# Patient Record
Sex: FEMALE | Race: WHITE | Employment: OTHER | URBAN - METROPOLITAN AREA
[De-identification: names, ages, dates, MRNs, and addresses within clinical notes are randomized per-mention and may not be internally consistent; named-entity substitution may affect disease eponyms.]

---

## 2017-01-13 DIAGNOSIS — I10 ESSENTIAL HYPERTENSION: ICD-10-CM

## 2017-01-17 RX ORDER — INSULIN ASPART 100 [IU]/ML
INJECTION, SOLUTION INTRAVENOUS; SUBCUTANEOUS
Qty: 5 PEN | Refills: 2 | Status: SHIPPED | OUTPATIENT
Start: 2017-01-17 | End: 2017-03-31 | Stop reason: SDUPTHER

## 2017-02-07 ENCOUNTER — OFFICE VISIT (OUTPATIENT)
Dept: FAMILY MEDICINE CLINIC | Age: 72
End: 2017-02-07

## 2017-02-07 VITALS
TEMPERATURE: 97 F | SYSTOLIC BLOOD PRESSURE: 140 MMHG | WEIGHT: 150 LBS | BODY MASS INDEX: 27.6 KG/M2 | OXYGEN SATURATION: 98 % | HEART RATE: 78 BPM | HEIGHT: 62 IN | DIASTOLIC BLOOD PRESSURE: 82 MMHG

## 2017-02-07 DIAGNOSIS — I10 ESSENTIAL HYPERTENSION: ICD-10-CM

## 2017-02-07 DIAGNOSIS — K21.9 GASTROESOPHAGEAL REFLUX DISEASE WITHOUT ESOPHAGITIS: ICD-10-CM

## 2017-02-07 DIAGNOSIS — C85.90 LYMPHOMA, UNSPECIFIED BODY REGION, UNSPECIFIED LYMPHOMA TYPE (HCC): ICD-10-CM

## 2017-02-07 DIAGNOSIS — R05.9 COUGH: ICD-10-CM

## 2017-02-07 DIAGNOSIS — J31.0 RHINITIS, UNSPECIFIED TYPE: Primary | ICD-10-CM

## 2017-02-07 DIAGNOSIS — J34.9 SINUS DISORDER: ICD-10-CM

## 2017-02-07 DIAGNOSIS — Z79.4 TYPE 2 DIABETES MELLITUS WITH DIABETIC NEUROPATHY, WITH LONG-TERM CURRENT USE OF INSULIN (HCC): ICD-10-CM

## 2017-02-07 DIAGNOSIS — G62.9 NEUROPATHY: ICD-10-CM

## 2017-02-07 DIAGNOSIS — E11.40 TYPE 2 DIABETES MELLITUS WITH DIABETIC NEUROPATHY, WITH LONG-TERM CURRENT USE OF INSULIN (HCC): ICD-10-CM

## 2017-02-07 DIAGNOSIS — G25.81 RESTLESS LEG SYNDROME: ICD-10-CM

## 2017-02-07 DIAGNOSIS — Z13.9 SCREENING: ICD-10-CM

## 2017-02-07 PROCEDURE — 99214 OFFICE O/P EST MOD 30 MIN: CPT | Performed by: FAMILY MEDICINE

## 2017-02-07 RX ORDER — PANTOPRAZOLE SODIUM 40 MG/1
40 TABLET, DELAYED RELEASE ORAL DAILY
Qty: 90 TABLET | Refills: 1 | Status: SHIPPED | OUTPATIENT
Start: 2017-02-07 | End: 2017-08-29 | Stop reason: SDUPTHER

## 2017-02-07 RX ORDER — PEN NEEDLE, DIABETIC 30 GX3/16"
1 NEEDLE, DISPOSABLE MISCELLANEOUS 2 TIMES DAILY
Qty: 180 EACH | Refills: 3 | Status: SHIPPED | OUTPATIENT
Start: 2017-02-07 | End: 2018-05-29 | Stop reason: SDUPTHER

## 2017-02-07 RX ORDER — GABAPENTIN 300 MG/1
300 CAPSULE ORAL 3 TIMES DAILY
Qty: 270 CAPSULE | Refills: 1 | Status: SHIPPED | OUTPATIENT
Start: 2017-02-07 | End: 2017-08-29 | Stop reason: SDUPTHER

## 2017-02-07 RX ORDER — BROMPHENIRAMINE MALEATE, PSEUDOEPHEDRINE HYDROCHLORIDE, AND DEXTROMETHORPHAN HYDROBROMIDE 2; 30; 10 MG/5ML; MG/5ML; MG/5ML
5 SYRUP ORAL 4 TIMES DAILY PRN
Qty: 240 ML | Refills: 1 | Status: SHIPPED | OUTPATIENT
Start: 2017-02-07 | End: 2017-05-25

## 2017-02-07 RX ORDER — ROPINIROLE 2 MG/1
2 TABLET, FILM COATED ORAL 2 TIMES DAILY
Qty: 180 TABLET | Refills: 1 | Status: SHIPPED | OUTPATIENT
Start: 2017-02-07 | End: 2017-08-29 | Stop reason: SDUPTHER

## 2017-02-07 RX ORDER — PEN NEEDLE, DIABETIC 30 GX3/16"
1 NEEDLE, DISPOSABLE MISCELLANEOUS 2 TIMES DAILY
COMMUNITY
End: 2017-02-07 | Stop reason: SDUPTHER

## 2017-02-07 ASSESSMENT — ENCOUNTER SYMPTOMS
RESPIRATORY NEGATIVE: 1
RHINORRHEA: 1
GASTROINTESTINAL NEGATIVE: 1

## 2017-02-10 ENCOUNTER — HOSPITAL ENCOUNTER (OUTPATIENT)
Dept: OTHER | Age: 72
Discharge: HOME | End: 2017-02-10
Attending: INTERNAL MEDICINE | Admitting: INTERNAL MEDICINE

## 2017-02-10 LAB
ALBUMIN SERPL-MCNC: 4.2 GM/DL (ref 3.4–5)
ALBUMIN SERPL-MCNC: 4.4 G/DL
ALP BLD-CCNC: 67 IU/L (ref 40–128)
ALP BLD-CCNC: 69 U/L
ALT SERPL-CCNC: 15 U/L (ref 10–40)
ALT SERPL-CCNC: 16 U/L
ANION GAP SERPL CALCULATED.3IONS-SCNC: 12 MMOL/L (ref 4–16)
AST SERPL-CCNC: 17 IU/L (ref 15–37)
AST SERPL-CCNC: 23 U/L
BASOPHILS ABSOLUTE: 0 /ΜL
BASOPHILS RELATIVE PERCENT: 1.1 %
BILIRUB SERPL-MCNC: 0.6 MG/DL (ref 0–1)
BILIRUB SERPL-MCNC: 0.7 MG/DL (ref 0.1–1.4)
BUN BLDV-MCNC: 15 MG/DL
BUN BLDV-MCNC: 15 MG/DL (ref 6–23)
CALCIUM SERPL-MCNC: 9 MG/DL (ref 8.3–10.6)
CALCIUM SERPL-MCNC: 9.6 MG/DL
CHLORIDE BLD-SCNC: 89 MMOL/L (ref 99–110)
CHLORIDE BLD-SCNC: 94 MMOL/L
CHOLESTEROL, TOTAL: 150 MG/DL
CHOLESTEROL/HDL RATIO: NORMAL
CO2: 26 MMOL/L (ref 21–32)
CO2: 28 MMOL/L
CREAT SERPL-MCNC: 0.8 MG/DL
CREAT SERPL-MCNC: 0.8 MG/DL (ref 0.6–1.1)
EOSINOPHILS ABSOLUTE: 0.1 /ΜL
EOSINOPHILS RELATIVE PERCENT: 1.7 %
GFR AFRICAN AMERICAN: >60 ML/MIN/1.73M2
GFR CALCULATED: 75
GFR NON-AFRICAN AMERICAN: >60 ML/MIN/1.73M2
GLUCOSE BLD-MCNC: 91 MG/DL
GLUCOSE FASTING: 227 MG/DL (ref 70–99)
HBA1C MFR BLD: 6.2 %
HCT VFR BLD CALC: 37 % (ref 36–46)
HDLC SERPL-MCNC: 43 MG/DL (ref 35–70)
HEMOGLOBIN: 13.1 G/DL (ref 12–16)
IGA: 21 MG/DL (ref 70–400)
IGA: ABNORMAL
IGG,SERUM: 258 MG/DL (ref 700–1600)
IGG,SERUM: ABNORMAL
IGM,SERUM: 641 MG/DL (ref 62–277)
LDL CHOLESTEROL CALCULATED: 76 MG/DL (ref 0–160)
LYMPHOCYTES ABSOLUTE: 0.4 /ΜL
LYMPHOCYTES RELATIVE PERCENT: 12.7 %
MCH RBC QN AUTO: 34 PG
MCHC RBC AUTO-ENTMCNC: 35.5 G/DL
MCV RBC AUTO: 95.7 FL
MONOCYTES ABSOLUTE: 0.3 /ΜL
MONOCYTES RELATIVE PERCENT: 9 %
NEUTROPHILS ABSOLUTE: 2.4 /ΜL
NEUTROPHILS RELATIVE PERCENT: 75.5 %
PDW BLD-RTO: 14 %
PLATELET # BLD: 165 K/ΜL
PMV BLD AUTO: NORMAL FL
POTASSIUM SERPL-SCNC: 3.7 MMOL/L (ref 3.5–5.1)
POTASSIUM SERPL-SCNC: 4.1 MMOL/L
RBC # BLD: 3.87 10^6/ΜL
SODIUM BLD-SCNC: 127 MMOL/L (ref 135–145)
SODIUM BLD-SCNC: 134 MMOL/L
TOTAL PROTEIN: 6.2 GM/DL (ref 6.4–8.2)
TOTAL PROTEIN: 6.6
TRIGL SERPL-MCNC: 155 MG/DL
VLDLC SERPL CALC-MCNC: 31 MG/DL
WBC # BLD: 3.2 10^3/ML

## 2017-02-13 LAB
ALBUMIN ELP: 3.8 GM/DL (ref 3.2–5.6)
ALPHA-1-GLOBULIN: 0.2 GM/DL (ref 0.1–0.4)
ALPHA-2-GLOBULIN: 0.6 GM/DL (ref 0.4–1.2)
BETA GLOBULIN: 0.8 GM/DL (ref 0.5–1.3)
GAMMA GLOBULIN: 0.9 GM/DL (ref 0.5–1.6)
IMMUNOFIXATION ELECTROPHORESIS 1: NORMAL
TOTAL PROTEIN: 6.2 GM/DL (ref 6.4–8.2)

## 2017-02-20 DIAGNOSIS — E11.40 TYPE 2 DIABETES MELLITUS WITH DIABETIC NEUROPATHY, WITH LONG-TERM CURRENT USE OF INSULIN (HCC): ICD-10-CM

## 2017-02-20 DIAGNOSIS — I10 ESSENTIAL HYPERTENSION: ICD-10-CM

## 2017-02-20 DIAGNOSIS — Z13.9 SCREENING: ICD-10-CM

## 2017-02-20 DIAGNOSIS — Z79.4 TYPE 2 DIABETES MELLITUS WITH DIABETIC NEUROPATHY, WITH LONG-TERM CURRENT USE OF INSULIN (HCC): ICD-10-CM

## 2017-05-25 ENCOUNTER — OFFICE VISIT (OUTPATIENT)
Dept: FAMILY MEDICINE CLINIC | Age: 72
End: 2017-05-25

## 2017-05-25 VITALS
DIASTOLIC BLOOD PRESSURE: 70 MMHG | OXYGEN SATURATION: 98 % | BODY MASS INDEX: 27.82 KG/M2 | HEIGHT: 62 IN | HEART RATE: 79 BPM | SYSTOLIC BLOOD PRESSURE: 118 MMHG | WEIGHT: 151.2 LBS

## 2017-05-25 DIAGNOSIS — E11.40 TYPE 2 DIABETES MELLITUS WITH DIABETIC NEUROPATHY, WITH LONG-TERM CURRENT USE OF INSULIN (HCC): Primary | ICD-10-CM

## 2017-05-25 DIAGNOSIS — I10 ESSENTIAL HYPERTENSION: ICD-10-CM

## 2017-05-25 DIAGNOSIS — G25.81 RESTLESS LEG SYNDROME: ICD-10-CM

## 2017-05-25 DIAGNOSIS — C83.00 MALIGNANT LYMPHOPLASMACYTIC LYMPHOMA (HCC): ICD-10-CM

## 2017-05-25 DIAGNOSIS — Z79.4 TYPE 2 DIABETES MELLITUS WITH DIABETIC NEUROPATHY, WITH LONG-TERM CURRENT USE OF INSULIN (HCC): Primary | ICD-10-CM

## 2017-05-25 LAB — HBA1C MFR BLD: 6.1 %

## 2017-05-25 PROCEDURE — 83036 HEMOGLOBIN GLYCOSYLATED A1C: CPT | Performed by: FAMILY MEDICINE

## 2017-05-25 PROCEDURE — 99214 OFFICE O/P EST MOD 30 MIN: CPT | Performed by: FAMILY MEDICINE

## 2017-05-25 RX ORDER — LOSARTAN POTASSIUM AND HYDROCHLOROTHIAZIDE 12.5; 1 MG/1; MG/1
1 TABLET ORAL DAILY
Qty: 90 TABLET | Refills: 3 | Status: SHIPPED | OUTPATIENT
Start: 2017-05-25 | End: 2018-05-29 | Stop reason: SDUPTHER

## 2017-05-25 RX ORDER — CYCLOSPORINE 0.5 MG/ML
1 EMULSION OPHTHALMIC 2 TIMES DAILY
COMMUNITY

## 2017-05-25 RX ORDER — GLUCOSAMINE HCL/CHONDROITIN SU 500-400 MG
CAPSULE ORAL
Qty: 100 STRIP | Refills: 11 | Status: SHIPPED | OUTPATIENT
Start: 2017-05-25 | End: 2018-05-29 | Stop reason: SDUPTHER

## 2017-05-25 RX ORDER — PIMECROLIMUS 10 MG/G
CREAM TOPICAL 2 TIMES DAILY
COMMUNITY
End: 2017-11-30 | Stop reason: SDUPTHER

## 2017-05-26 DIAGNOSIS — Z79.4 TYPE 2 DIABETES MELLITUS WITH DIABETIC NEUROPATHY, WITH LONG-TERM CURRENT USE OF INSULIN (HCC): ICD-10-CM

## 2017-05-26 DIAGNOSIS — E11.40 TYPE 2 DIABETES MELLITUS WITH DIABETIC NEUROPATHY, WITH LONG-TERM CURRENT USE OF INSULIN (HCC): ICD-10-CM

## 2017-05-26 RX ORDER — GLUCOSAMINE HCL/CHONDROITIN SU 500-400 MG
CAPSULE ORAL
Qty: 100 STRIP | Refills: 5 | Status: SHIPPED | OUTPATIENT
Start: 2017-05-26 | End: 2018-06-04 | Stop reason: SDUPTHER

## 2017-05-26 RX ORDER — LANCETS 30 GAUGE
EACH MISCELLANEOUS
Qty: 100 EACH | Refills: 5 | Status: SHIPPED | OUTPATIENT
Start: 2017-05-26

## 2017-05-26 ASSESSMENT — ENCOUNTER SYMPTOMS
ABDOMINAL PAIN: 0
EYE ITCHING: 0
APNEA: 0
COUGH: 0
CONSTIPATION: 0
VOMITING: 0
STRIDOR: 0
TROUBLE SWALLOWING: 0
SORE THROAT: 0
EYE REDNESS: 0
WHEEZING: 0
DIARRHEA: 0
SHORTNESS OF BREATH: 0
NAUSEA: 0
SINUS PRESSURE: 0

## 2017-06-09 ENCOUNTER — HOSPITAL ENCOUNTER (OUTPATIENT)
Dept: OTHER | Age: 72
Discharge: HOME | End: 2017-06-09
Attending: INTERNAL MEDICINE | Admitting: INTERNAL MEDICINE

## 2017-06-09 LAB
ALBUMIN SERPL-MCNC: 3.9 GM/DL (ref 3.4–5)
ALP BLD-CCNC: 69 IU/L (ref 40–128)
ALT SERPL-CCNC: 30 U/L (ref 10–40)
ANION GAP SERPL CALCULATED.3IONS-SCNC: 13 MMOL/L (ref 4–16)
AST SERPL-CCNC: 21 IU/L (ref 15–37)
BILIRUB SERPL-MCNC: 0.5 MG/DL (ref 0–1)
BUN BLDV-MCNC: 17 MG/DL (ref 6–23)
CALCIUM SERPL-MCNC: 9 MG/DL (ref 8.3–10.6)
CHLORIDE BLD-SCNC: 93 MMOL/L (ref 99–110)
CO2: 26 MMOL/L (ref 21–32)
CREAT SERPL-MCNC: 0.9 MG/DL (ref 0.6–1.1)
GFR AFRICAN AMERICAN: >60 ML/MIN/1.73M2
GFR NON-AFRICAN AMERICAN: >60 ML/MIN/1.73M2
GLUCOSE BLD-MCNC: 342 MG/DL (ref 70–140)
IGA: <50 MG/DL (ref 69–382)
IGG,SERUM: <300 MG/DL (ref 723–1685)
IGM,SERUM: 627 MG/DL (ref 62–277)
POTASSIUM SERPL-SCNC: 4.4 MMOL/L (ref 3.5–5.1)
SODIUM BLD-SCNC: 132 MMOL/L (ref 135–145)
TOTAL PROTEIN: 5.8 GM/DL (ref 6.4–8.2)

## 2017-06-12 LAB
ALBUMIN ELP: 2.8 GM/DL (ref 3.2–5.6)
ALPHA-1-GLOBULIN: 0.3 GM/DL (ref 0.1–0.4)
ALPHA-2-GLOBULIN: 0.7 GM/DL (ref 0.4–1.2)
BETA GLOBULIN: 0.6 GM/DL (ref 0.5–1.3)
GAMMA GLOBULIN: 0.6 GM/DL (ref 0.5–1.6)
IMMUNOFIXATION ELECTROPHORESIS 1: NORMAL
TOTAL PROTEIN: 5.8 GM/DL (ref 6.4–8.2)

## 2017-08-29 DIAGNOSIS — G25.81 RESTLESS LEG SYNDROME: ICD-10-CM

## 2017-08-29 DIAGNOSIS — G62.9 NEUROPATHY: ICD-10-CM

## 2017-08-29 DIAGNOSIS — K21.9 GASTROESOPHAGEAL REFLUX DISEASE WITHOUT ESOPHAGITIS: ICD-10-CM

## 2017-08-29 RX ORDER — GABAPENTIN 300 MG/1
300 CAPSULE ORAL 3 TIMES DAILY
Qty: 270 CAPSULE | Refills: 1 | Status: SHIPPED | OUTPATIENT
Start: 2017-08-29 | End: 2018-02-08 | Stop reason: SDUPTHER

## 2017-08-29 RX ORDER — PANTOPRAZOLE SODIUM 40 MG/1
40 TABLET, DELAYED RELEASE ORAL DAILY
Qty: 90 TABLET | Refills: 1 | Status: SHIPPED | OUTPATIENT
Start: 2017-08-29 | End: 2018-02-08 | Stop reason: SDUPTHER

## 2017-08-29 RX ORDER — ROPINIROLE 2 MG/1
2 TABLET, FILM COATED ORAL 2 TIMES DAILY
Qty: 180 TABLET | Refills: 1 | Status: SHIPPED | OUTPATIENT
Start: 2017-08-29 | End: 2018-02-08 | Stop reason: SDUPTHER

## 2017-09-06 ENCOUNTER — TELEPHONE (OUTPATIENT)
Dept: FAMILY MEDICINE CLINIC | Age: 72
End: 2017-09-06

## 2017-09-06 DIAGNOSIS — Z12.31 ENCOUNTER FOR SCREENING MAMMOGRAM FOR BREAST CANCER: Primary | ICD-10-CM

## 2017-09-06 DIAGNOSIS — M85.80 OSTEOPENIA, UNSPECIFIED LOCATION: ICD-10-CM

## 2017-09-07 PROBLEM — M85.80 OSTEOPENIA: Status: ACTIVE | Noted: 2017-09-07

## 2017-09-21 ENCOUNTER — HOSPITAL ENCOUNTER (OUTPATIENT)
Dept: WOMENS IMAGING | Age: 72
Discharge: HOME | End: 2017-09-21
Attending: FAMILY MEDICINE | Admitting: FAMILY MEDICINE

## 2017-09-21 DIAGNOSIS — M85.80 OSTEOPENIA, UNSPECIFIED LOCATION: ICD-10-CM

## 2017-09-21 DIAGNOSIS — Z12.31 ENCOUNTER FOR SCREENING MAMMOGRAM FOR BREAST CANCER: ICD-10-CM

## 2017-09-25 ENCOUNTER — HOSPITAL ENCOUNTER (OUTPATIENT)
Dept: ULTRASOUND IMAGING | Age: 72
Discharge: HOME | End: 2017-09-25
Attending: FAMILY MEDICINE | Admitting: FAMILY MEDICINE

## 2017-09-25 DIAGNOSIS — N64.89 BREAST ASYMMETRY: ICD-10-CM

## 2017-09-26 ENCOUNTER — TELEPHONE (OUTPATIENT)
Dept: FAMILY MEDICINE CLINIC | Age: 72
End: 2017-09-26

## 2017-09-27 ENCOUNTER — TELEPHONE (OUTPATIENT)
Dept: FAMILY MEDICINE CLINIC | Age: 72
End: 2017-09-27

## 2017-10-09 ENCOUNTER — HOSPITAL ENCOUNTER (OUTPATIENT)
Dept: OTHER | Age: 72
Discharge: HOME | End: 2017-10-09
Attending: INTERNAL MEDICINE | Admitting: INTERNAL MEDICINE

## 2017-10-09 LAB
ALBUMIN SERPL-MCNC: 4.3 GM/DL (ref 3.4–5)
ALP BLD-CCNC: 70 IU/L (ref 40–128)
ALT SERPL-CCNC: 18 U/L (ref 10–40)
ANION GAP SERPL CALCULATED.3IONS-SCNC: 11 MMOL/L (ref 4–16)
AST SERPL-CCNC: 17 IU/L (ref 15–37)
BILIRUB SERPL-MCNC: 0.5 MG/DL (ref 0–1)
BUN BLDV-MCNC: 20 MG/DL (ref 6–23)
CALCIUM SERPL-MCNC: 9.6 MG/DL (ref 8.3–10.6)
CHLORIDE BLD-SCNC: 98 MMOL/L (ref 99–110)
CO2: 28 MMOL/L (ref 21–32)
CREAT SERPL-MCNC: 1 MG/DL (ref 0.6–1.1)
GFR AFRICAN AMERICAN: >60 ML/MIN/1.73M2
GFR NON-AFRICAN AMERICAN: 55 ML/MIN/1.73M2
GLUCOSE BLD-MCNC: 117 MG/DL (ref 70–140)
IGA: <50 MG/DL (ref 69–382)
IGG,SERUM: <300 MG/DL (ref 723–1685)
IGM,SERUM: 625 MG/DL (ref 62–277)
POTASSIUM SERPL-SCNC: 3.6 MMOL/L (ref 3.5–5.1)
SODIUM BLD-SCNC: 137 MMOL/L (ref 135–145)
TOTAL PROTEIN: 6.2 GM/DL (ref 6.4–8.2)

## 2017-10-12 LAB
ALBUMIN ELP: 3.9 GM/DL (ref 3.2–5.6)
ALPHA-1-GLOBULIN: 0.2 GM/DL (ref 0.1–0.4)
ALPHA-2-GLOBULIN: 0.6 GM/DL (ref 0.4–1.2)
BETA GLOBULIN: 0.8 GM/DL (ref 0.5–1.3)
GAMMA GLOBULIN: 0.8 GM/DL (ref 0.5–1.6)
IMMUNOFIXATION ELECTROPHORESIS 1: NORMAL
TOTAL PROTEIN: 6.2 GM/DL (ref 6.4–8.2)

## 2017-11-30 ENCOUNTER — OFFICE VISIT (OUTPATIENT)
Dept: FAMILY MEDICINE CLINIC | Age: 72
End: 2017-11-30

## 2017-11-30 VITALS
HEART RATE: 76 BPM | DIASTOLIC BLOOD PRESSURE: 70 MMHG | SYSTOLIC BLOOD PRESSURE: 120 MMHG | RESPIRATION RATE: 18 BRPM | BODY MASS INDEX: 27.07 KG/M2 | WEIGHT: 148 LBS

## 2017-11-30 DIAGNOSIS — Z79.4 TYPE 2 DIABETES MELLITUS WITH DIABETIC NEUROPATHY, WITH LONG-TERM CURRENT USE OF INSULIN (HCC): Primary | ICD-10-CM

## 2017-11-30 DIAGNOSIS — E11.40 TYPE 2 DIABETES MELLITUS WITH DIABETIC NEUROPATHY, WITH LONG-TERM CURRENT USE OF INSULIN (HCC): Primary | ICD-10-CM

## 2017-11-30 DIAGNOSIS — Z23 NEED FOR PNEUMOCOCCAL VACCINE: ICD-10-CM

## 2017-11-30 DIAGNOSIS — K21.9 GASTROESOPHAGEAL REFLUX DISEASE WITHOUT ESOPHAGITIS: ICD-10-CM

## 2017-11-30 DIAGNOSIS — I10 ESSENTIAL HYPERTENSION: ICD-10-CM

## 2017-11-30 DIAGNOSIS — G25.81 RESTLESS LEG SYNDROME: ICD-10-CM

## 2017-11-30 DIAGNOSIS — C83.00 MALIGNANT LYMPHOPLASMACYTIC LYMPHOMA (HCC): ICD-10-CM

## 2017-11-30 DIAGNOSIS — G47.00 INSOMNIA, UNSPECIFIED TYPE: ICD-10-CM

## 2017-11-30 DIAGNOSIS — M81.0 OSTEOPOROSIS, UNSPECIFIED OSTEOPOROSIS TYPE, UNSPECIFIED PATHOLOGICAL FRACTURE PRESENCE: ICD-10-CM

## 2017-11-30 LAB
CREATININE URINE POCT: NORMAL
HBA1C MFR BLD: 6.6 %
MICROALBUMIN/CREAT 24H UR: 30 MG/G{CREAT}
MICROALBUMIN/CREAT UR-RTO: 50

## 2017-11-30 PROCEDURE — 82044 UR ALBUMIN SEMIQUANTITATIVE: CPT | Performed by: FAMILY MEDICINE

## 2017-11-30 PROCEDURE — 99214 OFFICE O/P EST MOD 30 MIN: CPT | Performed by: FAMILY MEDICINE

## 2017-11-30 PROCEDURE — 83036 HEMOGLOBIN GLYCOSYLATED A1C: CPT | Performed by: FAMILY MEDICINE

## 2017-11-30 PROCEDURE — 90670 PCV13 VACCINE IM: CPT | Performed by: FAMILY MEDICINE

## 2017-11-30 PROCEDURE — G0009 ADMIN PNEUMOCOCCAL VACCINE: HCPCS | Performed by: FAMILY MEDICINE

## 2017-11-30 RX ORDER — TRAZODONE HYDROCHLORIDE 50 MG/1
50 TABLET ORAL NIGHTLY
Qty: 30 TABLET | Refills: 5 | Status: SHIPPED | OUTPATIENT
Start: 2017-11-30 | End: 2018-05-29

## 2017-11-30 RX ORDER — ALENDRONATE SODIUM 70 MG/1
70 TABLET ORAL
Qty: 12 TABLET | Refills: 3 | Status: SHIPPED | OUTPATIENT
Start: 2017-11-30 | End: 2018-05-29 | Stop reason: SDUPTHER

## 2017-11-30 RX ORDER — PIMECROLIMUS 10 MG/G
CREAM TOPICAL 2 TIMES DAILY
Qty: 1 TUBE | Refills: 3 | Status: SHIPPED | OUTPATIENT
Start: 2017-11-30

## 2017-11-30 ASSESSMENT — ENCOUNTER SYMPTOMS
COUGH: 0
SHORTNESS OF BREATH: 0
SINUS PAIN: 0
SINUS PRESSURE: 0

## 2017-11-30 NOTE — PROGRESS NOTES
deficit. She exhibits normal muscle tone. Coordination normal.   Skin: She is not diaphoretic. Psychiatric: She has a normal mood and affect. Her behavior is normal.       ASSESSMENT/ PLAN:    1. Type 2 diabetes mellitus with diabetic neuropathy, with long-term current use of insulin (HCC)  - insulin glargine (LANTUS SOLOSTAR) 100 UNIT/ML injection pen; Inject 65 Units into the skin nightly  Dispense: 10 Pen; Refill: 3  - insulin aspart (NOVOLOG FLEXPEN) 100 UNIT/ML injection pen; Inject 20 Units into the skin 3 times daily (before meals)  Dispense: 5 Pen; Refill: 5  - POCT glycosylated hemoglobin (Hb A1C)  - POCT microalbumin  - Under control  - Patient need diabetic shoes for her diabetics neurophathy      2. Essential hypertension  - stable    3. Malignant lymphoplasmacytic lymphoma (HCC)  - Stable, f/u with the oncology    4. Gastroesophageal reflux disease without esophagitis  - stable    5. Insomnia, unspecified type  - Start:  - traZODone (DESYREL) 50 MG tablet; Take 1 tablet by mouth nightly  Dispense: 30 tablet; Refill: 5    6. Osteoporosis, unspecified osteoporosis type, unspecified pathological fracture presence  - Start Fosamax      7. Restless leg syndrome  - stable    8. Need for pneumococcal vaccine  - Pneumococcal conjugate vaccine 13-valent IM (PREVNAR 13)  - Tolerate the shot            - Appropriate prescription are addressed. - After visit summery provided. - Questions answered and patient verbalizes understanding.  - Call for any problem, questions, or concerns.  - RTC if symptoms worse. Return in about 6 months (around 5/30/2018).

## 2018-02-08 DIAGNOSIS — K21.9 GASTROESOPHAGEAL REFLUX DISEASE WITHOUT ESOPHAGITIS: ICD-10-CM

## 2018-02-08 DIAGNOSIS — G25.81 RESTLESS LEG SYNDROME: ICD-10-CM

## 2018-02-08 DIAGNOSIS — G62.9 NEUROPATHY: ICD-10-CM

## 2018-02-09 RX ORDER — PANTOPRAZOLE SODIUM 40 MG/1
40 TABLET, DELAYED RELEASE ORAL DAILY
Qty: 90 TABLET | Refills: 1 | Status: SHIPPED | OUTPATIENT
Start: 2018-02-09 | End: 2021-03-12

## 2018-02-09 RX ORDER — GABAPENTIN 300 MG/1
300 CAPSULE ORAL 3 TIMES DAILY
Qty: 270 CAPSULE | Refills: 1 | Status: SHIPPED | OUTPATIENT
Start: 2018-02-09 | End: 2018-05-29 | Stop reason: SDUPTHER

## 2018-02-09 RX ORDER — ROPINIROLE 2 MG/1
2 TABLET, FILM COATED ORAL 2 TIMES DAILY
Qty: 180 TABLET | Refills: 1 | Status: SHIPPED | OUTPATIENT
Start: 2018-02-09 | End: 2018-05-29 | Stop reason: SDUPTHER

## 2018-02-12 ENCOUNTER — HOSPITAL ENCOUNTER (OUTPATIENT)
Dept: OTHER | Age: 73
Discharge: HOME | End: 2018-02-12
Attending: INTERNAL MEDICINE | Admitting: INTERNAL MEDICINE

## 2018-02-12 LAB
ALBUMIN SERPL-MCNC: 4.1 GM/DL (ref 3.4–5)
ALP BLD-CCNC: 57 IU/L (ref 40–128)
ALT SERPL-CCNC: 16 U/L (ref 10–40)
ANION GAP SERPL CALCULATED.3IONS-SCNC: 13 MMOL/L (ref 4–16)
AST SERPL-CCNC: 21 IU/L (ref 15–37)
BILIRUB SERPL-MCNC: 0.5 MG/DL (ref 0–1)
BUN BLDV-MCNC: 18 MG/DL (ref 6–23)
CALCIUM SERPL-MCNC: 8.8 MG/DL (ref 8.3–10.6)
CHLORIDE BLD-SCNC: 90 MMOL/L (ref 99–110)
CO2: 24 MMOL/L (ref 21–32)
CREAT SERPL-MCNC: 0.8 MG/DL (ref 0.6–1.1)
GFR AFRICAN AMERICAN: >60 ML/MIN/1.73M2
GFR NON-AFRICAN AMERICAN: >60 ML/MIN/1.73M2
GLUCOSE BLD-MCNC: 258 MG/DL (ref 70–99)
IGA: <50 MG/DL (ref 69–382)
IGG,SERUM: <300 MG/DL (ref 723–1685)
IGM,SERUM: 519 MG/DL (ref 62–277)
LACTATE DEHYDROGENASE: 223 IU/L (ref 120–246)
POTASSIUM SERPL-SCNC: 4.2 MMOL/L (ref 3.5–5.1)
SODIUM BLD-SCNC: 127 MMOL/L (ref 135–145)
TOTAL PROTEIN: 5.9 GM/DL (ref 6.4–8.2)

## 2018-02-14 LAB
ALBUMIN ELP: 3.8 GM/DL (ref 3.2–5.6)
ALPHA-1-GLOBULIN: 0.2 GM/DL (ref 0.1–0.4)
ALPHA-2-GLOBULIN: 0.7 GM/DL (ref 0.4–1.2)
BETA GLOBULIN: 0.6 GM/DL (ref 0.5–1.3)
GAMMA GLOBULIN: 0.6 GM/DL (ref 0.5–1.6)
IMMUNOFIXATION ELECTROPHORESIS 1: NORMAL
TOTAL PROTEIN: 5.9 GM/DL (ref 6.4–8.2)

## 2018-02-18 DIAGNOSIS — K21.9 GASTROESOPHAGEAL REFLUX DISEASE WITHOUT ESOPHAGITIS: ICD-10-CM

## 2018-02-19 RX ORDER — PANTOPRAZOLE SODIUM 40 MG/1
TABLET, DELAYED RELEASE ORAL
Qty: 90 TABLET | Refills: 1 | Status: SHIPPED | OUTPATIENT
Start: 2018-02-19 | End: 2018-09-28 | Stop reason: SDUPTHER

## 2018-05-29 ENCOUNTER — OFFICE VISIT (OUTPATIENT)
Dept: FAMILY MEDICINE CLINIC | Age: 73
End: 2018-05-29

## 2018-05-29 VITALS
BODY MASS INDEX: 29.11 KG/M2 | WEIGHT: 158.2 LBS | OXYGEN SATURATION: 96 % | DIASTOLIC BLOOD PRESSURE: 76 MMHG | HEART RATE: 75 BPM | SYSTOLIC BLOOD PRESSURE: 138 MMHG | HEIGHT: 62 IN

## 2018-05-29 DIAGNOSIS — Z00.00 ROUTINE GENERAL MEDICAL EXAMINATION AT A HEALTH CARE FACILITY: Primary | ICD-10-CM

## 2018-05-29 DIAGNOSIS — G47.00 INSOMNIA, UNSPECIFIED TYPE: ICD-10-CM

## 2018-05-29 DIAGNOSIS — Z79.4 TYPE 2 DIABETES MELLITUS WITH DIABETIC NEUROPATHY, WITH LONG-TERM CURRENT USE OF INSULIN (HCC): ICD-10-CM

## 2018-05-29 DIAGNOSIS — I10 ESSENTIAL HYPERTENSION: ICD-10-CM

## 2018-05-29 DIAGNOSIS — G25.81 RESTLESS LEG SYNDROME: ICD-10-CM

## 2018-05-29 DIAGNOSIS — M81.0 OSTEOPOROSIS, UNSPECIFIED OSTEOPOROSIS TYPE, UNSPECIFIED PATHOLOGICAL FRACTURE PRESENCE: ICD-10-CM

## 2018-05-29 DIAGNOSIS — G62.9 NEUROPATHY: ICD-10-CM

## 2018-05-29 DIAGNOSIS — E11.40 TYPE 2 DIABETES MELLITUS WITH DIABETIC NEUROPATHY, WITH LONG-TERM CURRENT USE OF INSULIN (HCC): ICD-10-CM

## 2018-05-29 DIAGNOSIS — K21.9 GASTROESOPHAGEAL REFLUX DISEASE WITHOUT ESOPHAGITIS: ICD-10-CM

## 2018-05-29 PROCEDURE — G0438 PPPS, INITIAL VISIT: HCPCS | Performed by: FAMILY MEDICINE

## 2018-05-29 RX ORDER — LOSARTAN POTASSIUM AND HYDROCHLOROTHIAZIDE 12.5; 1 MG/1; MG/1
1 TABLET ORAL DAILY
Qty: 90 TABLET | Refills: 5 | Status: SHIPPED | OUTPATIENT
Start: 2018-05-29 | End: 2021-03-12 | Stop reason: CLARIF

## 2018-05-29 RX ORDER — ALENDRONATE SODIUM 70 MG/1
70 TABLET ORAL
Qty: 12 TABLET | Refills: 5 | Status: SHIPPED | OUTPATIENT
Start: 2018-05-29

## 2018-05-29 RX ORDER — PEN NEEDLE, DIABETIC 30 GX3/16"
1 NEEDLE, DISPOSABLE MISCELLANEOUS 2 TIMES DAILY
Qty: 180 EACH | Refills: 3 | Status: SHIPPED | OUTPATIENT
Start: 2018-05-29 | End: 2019-04-04 | Stop reason: SDUPTHER

## 2018-05-29 RX ORDER — GUAIFENESIN 600 MG/1
1200 TABLET, EXTENDED RELEASE ORAL 2 TIMES DAILY
Qty: 60 TABLET | Refills: 0 | Status: SHIPPED | OUTPATIENT
Start: 2018-05-29 | End: 2021-03-12

## 2018-05-29 RX ORDER — ACETAMINOPHEN 500 MG
500 TABLET ORAL EVERY 6 HOURS PRN
COMMUNITY

## 2018-05-29 RX ORDER — GLUCOSAMINE HCL/CHONDROITIN SU 500-400 MG
CAPSULE ORAL
Qty: 100 STRIP | Refills: 11 | Status: SHIPPED | OUTPATIENT
Start: 2018-05-29

## 2018-05-29 RX ORDER — TRAZODONE HYDROCHLORIDE 50 MG/1
50 TABLET ORAL NIGHTLY
Qty: 30 TABLET | Refills: 5 | Status: SHIPPED | OUTPATIENT
Start: 2018-05-29 | End: 2021-03-12

## 2018-05-29 RX ORDER — GABAPENTIN 300 MG/1
300 CAPSULE ORAL 3 TIMES DAILY
Qty: 270 CAPSULE | Refills: 5 | Status: SHIPPED | OUTPATIENT
Start: 2018-05-29 | End: 2022-05-27

## 2018-05-29 RX ORDER — ROPINIROLE 2 MG/1
2 TABLET, FILM COATED ORAL 2 TIMES DAILY
Qty: 180 TABLET | Refills: 5 | Status: SHIPPED | OUTPATIENT
Start: 2018-05-29

## 2018-05-29 RX ORDER — M-VIT,TX,IRON,MINS/CALC/FOLIC 27MG-0.4MG
1 TABLET ORAL DAILY
COMMUNITY

## 2018-05-29 ASSESSMENT — PATIENT HEALTH QUESTIONNAIRE - PHQ9
SUM OF ALL RESPONSES TO PHQ9 QUESTIONS 1 & 2: 0
1. LITTLE INTEREST OR PLEASURE IN DOING THINGS: 0
SUM OF ALL RESPONSES TO PHQ QUESTIONS 1-9: 0
2. FEELING DOWN, DEPRESSED OR HOPELESS: 0
SUM OF ALL RESPONSES TO PHQ QUESTIONS 1-9: 0

## 2018-05-29 ASSESSMENT — ENCOUNTER SYMPTOMS
SORE THROAT: 1
WHEEZING: 0
RHINORRHEA: 0
SINUS PAIN: 0
STRIDOR: 0
COUGH: 1
SHORTNESS OF BREATH: 0

## 2018-05-29 ASSESSMENT — ANXIETY QUESTIONNAIRES: GAD7 TOTAL SCORE: 0

## 2018-05-29 ASSESSMENT — LIFESTYLE VARIABLES: HOW OFTEN DO YOU HAVE A DRINK CONTAINING ALCOHOL: 0

## 2018-06-03 DIAGNOSIS — Z79.4 TYPE 2 DIABETES MELLITUS WITH DIABETIC NEUROPATHY, WITH LONG-TERM CURRENT USE OF INSULIN (HCC): ICD-10-CM

## 2018-06-03 DIAGNOSIS — E11.40 TYPE 2 DIABETES MELLITUS WITH DIABETIC NEUROPATHY, WITH LONG-TERM CURRENT USE OF INSULIN (HCC): ICD-10-CM

## 2018-06-04 DIAGNOSIS — E11.40 TYPE 2 DIABETES MELLITUS WITH DIABETIC NEUROPATHY, WITH LONG-TERM CURRENT USE OF INSULIN (HCC): ICD-10-CM

## 2018-06-04 DIAGNOSIS — Z79.4 TYPE 2 DIABETES MELLITUS WITH DIABETIC NEUROPATHY, WITH LONG-TERM CURRENT USE OF INSULIN (HCC): ICD-10-CM

## 2018-06-05 RX ORDER — GLUCOSAMINE HCL/CHONDROITIN SU 500-400 MG
CAPSULE ORAL
Qty: 100 STRIP | Refills: 5 | Status: SHIPPED | OUTPATIENT
Start: 2018-06-05 | End: 2021-07-27 | Stop reason: SDUPTHER

## 2018-06-25 ENCOUNTER — HOSPITAL ENCOUNTER (OUTPATIENT)
Dept: OTHER | Age: 73
Discharge: HOME | End: 2018-06-25
Attending: INTERNAL MEDICINE | Admitting: INTERNAL MEDICINE

## 2018-06-25 LAB
ALBUMIN SERPL-MCNC: 4.1 GM/DL (ref 3.4–5)
ALP BLD-CCNC: 49 IU/L (ref 40–128)
ALT SERPL-CCNC: 19 U/L (ref 10–40)
ANION GAP SERPL CALCULATED.3IONS-SCNC: 13 MMOL/L (ref 4–16)
AST SERPL-CCNC: 20 IU/L (ref 15–37)
BILIRUB SERPL-MCNC: 0.4 MG/DL (ref 0–1)
BUN BLDV-MCNC: 16 MG/DL (ref 6–23)
CALCIUM SERPL-MCNC: 9.3 MG/DL (ref 8.3–10.6)
CHLORIDE BLD-SCNC: 95 MMOL/L (ref 99–110)
CO2: 25 MMOL/L (ref 21–32)
CREAT SERPL-MCNC: 0.8 MG/DL (ref 0.6–1.1)
GFR AFRICAN AMERICAN: >60 ML/MIN/1.73M2
GFR NON-AFRICAN AMERICAN: >60 ML/MIN/1.73M2
GLUCOSE BLD-MCNC: 150 MG/DL (ref 70–99)
IGA: 56 MG/DL (ref 69–382)
IGG,SERUM: <300 MG/DL (ref 723–1685)
IGM,SERUM: 480 MG/DL (ref 62–277)
LACTATE DEHYDROGENASE: 204 IU/L (ref 120–246)
POTASSIUM SERPL-SCNC: 3.8 MMOL/L (ref 3.5–5.1)
SODIUM BLD-SCNC: 133 MMOL/L (ref 135–145)
TOTAL PROTEIN: 5.9 GM/DL (ref 6.4–8.2)

## 2018-06-27 LAB
ALBUMIN ELP: 3.7 GM/DL (ref 3.2–5.6)
ALPHA-1-GLOBULIN: 0.2 GM/DL (ref 0.1–0.4)
ALPHA-2-GLOBULIN: 0.6 GM/DL (ref 0.4–1.2)
BETA GLOBULIN: 0.7 GM/DL (ref 0.5–1.3)
GAMMA GLOBULIN: 0.7 GM/DL (ref 0.5–1.6)
TOTAL PROTEIN: 5.9 GM/DL (ref 6.4–8.2)

## 2018-09-24 ENCOUNTER — HOSPITAL ENCOUNTER (OUTPATIENT)
Dept: WOMENS IMAGING | Age: 73
Discharge: HOME | End: 2018-09-24
Attending: INTERNAL MEDICINE
Payer: MEDICARE

## 2018-09-24 DIAGNOSIS — Z12.31 ENCOUNTER FOR SCREENING MAMMOGRAM FOR BREAST CANCER: ICD-10-CM

## 2018-09-24 PROCEDURE — 77067 SCR MAMMO BI INCL CAD: CPT

## 2018-09-28 DIAGNOSIS — K21.9 GASTROESOPHAGEAL REFLUX DISEASE WITHOUT ESOPHAGITIS: ICD-10-CM

## 2018-09-28 RX ORDER — PANTOPRAZOLE SODIUM 40 MG/1
TABLET, DELAYED RELEASE ORAL
Qty: 90 TABLET | Refills: 1 | Status: SHIPPED | OUTPATIENT
Start: 2018-09-28 | End: 2021-03-12

## 2018-10-09 ENCOUNTER — HOSPITAL ENCOUNTER (OUTPATIENT)
Dept: ULTRASOUND IMAGING | Age: 73
Discharge: HOME | End: 2018-10-09
Payer: MEDICARE

## 2018-10-09 DIAGNOSIS — R19.00 PELVIC MASS: ICD-10-CM

## 2018-10-09 PROCEDURE — 76856 US EXAM PELVIC COMPLETE: CPT

## 2018-11-02 ENCOUNTER — HOSPITAL ENCOUNTER (OUTPATIENT)
Age: 73
Setting detail: SPECIMEN
Discharge: HOME | End: 2018-11-02
Payer: MEDICARE

## 2018-11-02 LAB
ALBUMIN SERPL-MCNC: 4.2 GM/DL (ref 3.4–5)
ALP BLD-CCNC: 48 IU/L (ref 40–128)
ALT SERPL-CCNC: 16 U/L (ref 10–40)
ANION GAP SERPL CALCULATED.3IONS-SCNC: 9 MMOL/L (ref 4–16)
AST SERPL-CCNC: 17 IU/L (ref 15–37)
BILIRUB SERPL-MCNC: 0.7 MG/DL (ref 0–1)
BUN BLDV-MCNC: 17 MG/DL (ref 6–23)
CALCIUM SERPL-MCNC: 9.3 MG/DL (ref 8.3–10.6)
CHLORIDE BLD-SCNC: 94 MMOL/L (ref 99–110)
CO2: 28 MMOL/L (ref 21–32)
CREAT SERPL-MCNC: 0.8 MG/DL (ref 0.6–1.1)
GFR AFRICAN AMERICAN: >60 ML/MIN/1.73M2
GFR NON-AFRICAN AMERICAN: >60 ML/MIN/1.73M2
GLUCOSE BLD-MCNC: 141 MG/DL (ref 70–99)
IGA: 76 MG/DL (ref 69–382)
IGG,SERUM: <300 MG/DL (ref 723–1685)
IGM,SERUM: 421 MG/DL (ref 62–277)
LACTATE DEHYDROGENASE: 177 IU/L (ref 120–246)
POTASSIUM SERPL-SCNC: 3.8 MMOL/L (ref 3.5–5.1)
SODIUM BLD-SCNC: 131 MMOL/L (ref 135–145)
TOTAL PROTEIN: 5.9 GM/DL (ref 6.4–8.2)

## 2018-11-02 PROCEDURE — 82784 ASSAY IGA/IGD/IGG/IGM EACH: CPT

## 2018-11-02 PROCEDURE — 86320 SERUM IMMUNOELECTROPHORESIS: CPT

## 2018-11-02 PROCEDURE — 80053 COMPREHEN METABOLIC PANEL: CPT

## 2018-11-02 PROCEDURE — 84165 PROTEIN E-PHORESIS SERUM: CPT

## 2018-11-02 PROCEDURE — 83615 LACTATE (LD) (LDH) ENZYME: CPT

## 2018-11-07 LAB
ALBUMIN ELP: 3.7 GM/DL (ref 3.2–5.6)
ALPHA-1-GLOBULIN: 0.2 GM/DL (ref 0.1–0.4)
ALPHA-2-GLOBULIN: 0.7 GM/DL (ref 0.4–1.2)
BETA GLOBULIN: 0.7 GM/DL (ref 0.5–1.3)
GAMMA GLOBULIN: 0.6 GM/DL (ref 0.5–1.6)
TOTAL PROTEIN: 5.9 GM/DL (ref 6.4–8.2)

## 2019-03-11 ENCOUNTER — HOSPITAL ENCOUNTER (OUTPATIENT)
Age: 74
Setting detail: SPECIMEN
Discharge: HOME | End: 2019-03-11
Payer: MEDICARE

## 2019-03-11 LAB
ALBUMIN SERPL-MCNC: 4.2 GM/DL (ref 3.4–5)
ALP BLD-CCNC: 47 IU/L (ref 40–128)
ALT SERPL-CCNC: 18 U/L (ref 10–40)
ANION GAP SERPL CALCULATED.3IONS-SCNC: 10 MMOL/L (ref 4–16)
AST SERPL-CCNC: 19 IU/L (ref 15–37)
BILIRUB SERPL-MCNC: 0.5 MG/DL (ref 0–1)
BUN BLDV-MCNC: 14 MG/DL (ref 6–23)
CALCIUM SERPL-MCNC: 9.4 MG/DL (ref 8.3–10.6)
CHLORIDE BLD-SCNC: 95 MMOL/L (ref 99–110)
CO2: 28 MMOL/L (ref 21–32)
CREAT SERPL-MCNC: 0.9 MG/DL (ref 0.6–1.1)
GFR AFRICAN AMERICAN: >60 ML/MIN/1.73M2
GFR NON-AFRICAN AMERICAN: >60 ML/MIN/1.73M2
GLUCOSE BLD-MCNC: 137 MG/DL (ref 70–99)
IGA: 76 MG/DL (ref 69–382)
IGG,SERUM: <300 MG/DL (ref 723–1685)
IGM,SERUM: 429 MG/DL (ref 62–277)
LACTATE DEHYDROGENASE: 196 IU/L (ref 120–246)
POTASSIUM SERPL-SCNC: 3.7 MMOL/L (ref 3.5–5.1)
SODIUM BLD-SCNC: 133 MMOL/L (ref 135–145)
TOTAL PROTEIN: 6 GM/DL (ref 6.4–8.2)

## 2019-03-11 PROCEDURE — 84165 PROTEIN E-PHORESIS SERUM: CPT

## 2019-03-11 PROCEDURE — 86320 SERUM IMMUNOELECTROPHORESIS: CPT

## 2019-03-11 PROCEDURE — 83615 LACTATE (LD) (LDH) ENZYME: CPT

## 2019-03-11 PROCEDURE — 82784 ASSAY IGA/IGD/IGG/IGM EACH: CPT

## 2019-03-11 PROCEDURE — 80053 COMPREHEN METABOLIC PANEL: CPT

## 2019-03-12 LAB
ALBUMIN ELP: 3.6 GM/DL (ref 3.2–5.6)
ALPHA-1-GLOBULIN: 0.2 GM/DL (ref 0.1–0.4)
ALPHA-2-GLOBULIN: 0.7 GM/DL (ref 0.4–1.2)
BETA GLOBULIN: 0.8 GM/DL (ref 0.5–1.3)
GAMMA GLOBULIN: 0.7 GM/DL (ref 0.5–1.6)
SPEP INTERPRETATION: ABNORMAL
SPEP INTERPRETATION: NORMAL
TOTAL PROTEIN: 6 GM/DL (ref 6.4–8.2)

## 2019-04-04 DIAGNOSIS — E11.40 TYPE 2 DIABETES MELLITUS WITH DIABETIC NEUROPATHY, WITH LONG-TERM CURRENT USE OF INSULIN (HCC): ICD-10-CM

## 2019-04-04 DIAGNOSIS — Z79.4 TYPE 2 DIABETES MELLITUS WITH DIABETIC NEUROPATHY, WITH LONG-TERM CURRENT USE OF INSULIN (HCC): ICD-10-CM

## 2019-04-04 RX ORDER — PEN NEEDLE, DIABETIC 30 GX3/16"
1 NEEDLE, DISPOSABLE MISCELLANEOUS 2 TIMES DAILY
Qty: 180 EACH | Refills: 3 | Status: SHIPPED | OUTPATIENT
Start: 2019-04-04

## 2019-07-15 ENCOUNTER — HOSPITAL ENCOUNTER (OUTPATIENT)
Age: 74
Setting detail: SPECIMEN
Discharge: HOME | End: 2019-07-15
Payer: MEDICARE

## 2019-07-15 LAB
ALBUMIN SERPL-MCNC: 4.1 GM/DL (ref 3.4–5)
ALP BLD-CCNC: 46 IU/L (ref 40–128)
ALT SERPL-CCNC: 18 U/L (ref 10–40)
ANION GAP SERPL CALCULATED.3IONS-SCNC: 12 MMOL/L (ref 4–16)
AST SERPL-CCNC: 18 IU/L (ref 15–37)
BILIRUB SERPL-MCNC: 0.3 MG/DL (ref 0–1)
BUN BLDV-MCNC: 22 MG/DL (ref 6–23)
CALCIUM SERPL-MCNC: 9.1 MG/DL (ref 8.3–10.6)
CHLORIDE BLD-SCNC: 102 MMOL/L (ref 99–110)
CO2: 24 MMOL/L (ref 21–32)
CREAT SERPL-MCNC: 0.9 MG/DL (ref 0.6–1.1)
GFR AFRICAN AMERICAN: >60 ML/MIN/1.73M2
GFR NON-AFRICAN AMERICAN: >60 ML/MIN/1.73M2
GLUCOSE BLD-MCNC: 240 MG/DL (ref 70–99)
IGA: 75 MG/DL (ref 69–382)
IGG,SERUM: <300 MG/DL (ref 723–1685)
IGM,SERUM: 383 MG/DL (ref 62–277)
LACTATE DEHYDROGENASE: 195 IU/L (ref 120–246)
POTASSIUM SERPL-SCNC: 4.1 MMOL/L (ref 3.5–5.1)
SODIUM BLD-SCNC: 138 MMOL/L (ref 135–145)
TOTAL PROTEIN: 5.9 GM/DL (ref 6.4–8.2)

## 2019-07-15 PROCEDURE — 86320 SERUM IMMUNOELECTROPHORESIS: CPT

## 2019-07-15 PROCEDURE — 82784 ASSAY IGA/IGD/IGG/IGM EACH: CPT

## 2019-07-15 PROCEDURE — 84165 PROTEIN E-PHORESIS SERUM: CPT

## 2019-07-15 PROCEDURE — 80053 COMPREHEN METABOLIC PANEL: CPT

## 2019-07-15 PROCEDURE — 83615 LACTATE (LD) (LDH) ENZYME: CPT

## 2019-07-18 LAB
ALBUMIN ELP: 3.8 GM/DL (ref 3.2–5.6)
ALPHA-1-GLOBULIN: 0.2 GM/DL (ref 0.1–0.4)
ALPHA-2-GLOBULIN: 0.7 GM/DL (ref 0.4–1.2)
BETA GLOBULIN: 0.7 GM/DL (ref 0.5–1.3)
GAMMA GLOBULIN: 0.6 GM/DL (ref 0.5–1.6)
SPEP INTERPRETATION: ABNORMAL
SPEP INTERPRETATION: NORMAL
TOTAL PROTEIN: 5.9 GM/DL (ref 6.4–8.2)

## 2019-09-30 ENCOUNTER — HOSPITAL ENCOUNTER (OUTPATIENT)
Dept: WOMENS IMAGING | Age: 74
Discharge: HOME | End: 2019-09-30
Payer: MEDICARE

## 2019-09-30 DIAGNOSIS — Z12.39 BREAST CANCER SCREENING: ICD-10-CM

## 2019-09-30 DIAGNOSIS — N95.1 SYMPTOMATIC MENOPAUSAL OR FEMALE CLIMACTERIC STATES: ICD-10-CM

## 2019-09-30 PROCEDURE — 77080 DXA BONE DENSITY AXIAL: CPT

## 2019-09-30 PROCEDURE — 77063 BREAST TOMOSYNTHESIS BI: CPT

## 2019-11-25 ENCOUNTER — HOSPITAL ENCOUNTER (OUTPATIENT)
Age: 74
Setting detail: SPECIMEN
Discharge: HOME | End: 2019-11-25
Payer: MEDICARE

## 2019-11-25 LAB
ALBUMIN SERPL-MCNC: 4.2 GM/DL (ref 3.4–5)
ALP BLD-CCNC: 51 IU/L (ref 40–129)
ALT SERPL-CCNC: 15 U/L (ref 10–40)
ANION GAP SERPL CALCULATED.3IONS-SCNC: 11 MMOL/L (ref 4–16)
AST SERPL-CCNC: 18 IU/L (ref 15–37)
BACTERIA: NEGATIVE /HPF
BILIRUB SERPL-MCNC: 0.5 MG/DL (ref 0–1)
BILIRUBIN URINE: NEGATIVE MG/DL
BLOOD, URINE: NEGATIVE
BUN BLDV-MCNC: 18 MG/DL (ref 6–23)
CALCIUM SERPL-MCNC: 9.4 MG/DL (ref 8.3–10.6)
CHLORIDE BLD-SCNC: 99 MMOL/L (ref 99–110)
CLARITY: CLEAR
CO2: 27 MMOL/L (ref 21–32)
COLOR: YELLOW
CREAT SERPL-MCNC: 0.9 MG/DL (ref 0.6–1.1)
GFR AFRICAN AMERICAN: >60 ML/MIN/1.73M2
GFR NON-AFRICAN AMERICAN: >60 ML/MIN/1.73M2
GLUCOSE BLD-MCNC: 135 MG/DL (ref 70–99)
GLUCOSE, URINE: >500 MG/DL
IGA: 117 MG/DL (ref 69–382)
IGG,SERUM: 307 MG/DL (ref 723–1685)
IGM,SERUM: 419 MG/DL (ref 62–277)
KETONES, URINE: NEGATIVE MG/DL
LACTATE DEHYDROGENASE: 199 IU/L (ref 120–246)
LEUKOCYTE ESTERASE, URINE: NEGATIVE
MUCUS: ABNORMAL HPF
NITRITE URINE, QUANTITATIVE: NEGATIVE
PH, URINE: 5 (ref 5–8)
POTASSIUM SERPL-SCNC: 4 MMOL/L (ref 3.5–5.1)
PROTEIN UA: NEGATIVE MG/DL
RBC URINE: <1 /HPF (ref 0–6)
SODIUM BLD-SCNC: 137 MMOL/L (ref 135–145)
SPECIFIC GRAVITY UA: 1.02 (ref 1–1.03)
SQUAMOUS EPITHELIAL: <1 /HPF
TOTAL PROTEIN: 5.9 GM/DL (ref 6.4–8.2)
TRICHOMONAS: ABNORMAL /HPF
UROBILINOGEN, URINE: NORMAL MG/DL (ref 0.2–1)
WBC UA: 3 /HPF (ref 0–5)

## 2019-11-25 PROCEDURE — 83615 LACTATE (LD) (LDH) ENZYME: CPT

## 2019-11-25 PROCEDURE — 82784 ASSAY IGA/IGD/IGG/IGM EACH: CPT

## 2019-11-25 PROCEDURE — 80053 COMPREHEN METABOLIC PANEL: CPT

## 2019-11-25 PROCEDURE — 84165 PROTEIN E-PHORESIS SERUM: CPT

## 2019-11-25 PROCEDURE — 81001 URINALYSIS AUTO W/SCOPE: CPT

## 2019-11-25 PROCEDURE — 86320 SERUM IMMUNOELECTROPHORESIS: CPT

## 2019-11-27 LAB
ALBUMIN ELP: 3.7 GM/DL (ref 3.2–5.6)
ALPHA-1-GLOBULIN: 0.2 GM/DL (ref 0.1–0.4)
ALPHA-2-GLOBULIN: 0.7 GM/DL (ref 0.4–1.2)
BETA GLOBULIN: 0.7 GM/DL (ref 0.5–1.3)
GAMMA GLOBULIN: 0.6 GM/DL (ref 0.5–1.6)
SPEP INTERPRETATION: ABNORMAL
SPEP INTERPRETATION: NORMAL
TOTAL PROTEIN: 5.9 GM/DL (ref 6.4–8.2)

## 2020-04-02 ENCOUNTER — HOSPITAL ENCOUNTER (OUTPATIENT)
Dept: INFUSION THERAPY | Age: 75
Discharge: HOME | End: 2020-04-02
Payer: MEDICARE

## 2020-04-02 LAB
ALBUMIN SERPL-MCNC: 4.4 GM/DL (ref 3.4–5)
ALP BLD-CCNC: 55 IU/L (ref 40–128)
ALT SERPL-CCNC: 16 U/L (ref 10–40)
ANION GAP SERPL CALCULATED.3IONS-SCNC: 12 MMOL/L (ref 4–16)
AST SERPL-CCNC: 18 IU/L (ref 15–37)
BACTERIA: NEGATIVE /HPF
BASOPHILS ABSOLUTE: 0 K/CU MM
BASOPHILS RELATIVE PERCENT: 0.4 % (ref 0–1)
BILIRUB SERPL-MCNC: 0.4 MG/DL (ref 0–1)
BILIRUBIN URINE: NEGATIVE MG/DL
BLOOD, URINE: NEGATIVE
BUN BLDV-MCNC: 24 MG/DL (ref 6–23)
CALCIUM SERPL-MCNC: 9.8 MG/DL (ref 8.3–10.6)
CHLORIDE BLD-SCNC: 100 MMOL/L (ref 99–110)
CLARITY: CLEAR
CO2: 25 MMOL/L (ref 21–32)
COLOR: YELLOW
CREAT SERPL-MCNC: 1 MG/DL (ref 0.6–1.1)
DIFFERENTIAL TYPE: ABNORMAL
EOSINOPHILS ABSOLUTE: 0.1 K/CU MM
EOSINOPHILS RELATIVE PERCENT: 2.6 % (ref 0–3)
GFR AFRICAN AMERICAN: >60 ML/MIN/1.73M2
GFR NON-AFRICAN AMERICAN: 54 ML/MIN/1.73M2
GLUCOSE BLD-MCNC: 149 MG/DL (ref 70–99)
GLUCOSE, URINE: >500 MG/DL
HCT VFR BLD CALC: 40.6 % (ref 37–47)
HEMOGLOBIN: 14.3 GM/DL (ref 12.5–16)
IGA: 173 MG/DL (ref 69–382)
IGG,SERUM: <300 MG/DL (ref 723–1685)
IGM,SERUM: 431 MG/DL (ref 62–277)
KETONES, URINE: NEGATIVE MG/DL
LACTATE DEHYDROGENASE: 210 IU/L (ref 120–246)
LEUKOCYTE ESTERASE, URINE: NEGATIVE
LYMPHOCYTES ABSOLUTE: 1.4 K/CU MM
LYMPHOCYTES RELATIVE PERCENT: 27.3 % (ref 24–44)
MCH RBC QN AUTO: 33.8 PG (ref 27–31)
MCHC RBC AUTO-ENTMCNC: 35.2 % (ref 32–36)
MCV RBC AUTO: 96 FL (ref 78–100)
MONOCYTES ABSOLUTE: 0.4 K/CU MM
MONOCYTES RELATIVE PERCENT: 7.3 % (ref 0–4)
NITRITE URINE, QUANTITATIVE: NEGATIVE
PDW BLD-RTO: 13.9 % (ref 11.7–14.9)
PH, URINE: 6 (ref 5–8)
PLATELET # BLD: 139 K/CU MM (ref 140–440)
PMV BLD AUTO: 9.2 FL (ref 7.5–11.1)
POTASSIUM SERPL-SCNC: 4.1 MMOL/L (ref 3.5–5.1)
PROTEIN UA: NEGATIVE MG/DL
RBC # BLD: 4.23 M/CU MM (ref 4.2–5.4)
RBC URINE: <1 /HPF (ref 0–6)
SEGMENTED NEUTROPHILS ABSOLUTE COUNT: 3.1 K/CU MM
SEGMENTED NEUTROPHILS RELATIVE PERCENT: 62.4 % (ref 36–66)
SODIUM BLD-SCNC: 137 MMOL/L (ref 135–145)
SPECIFIC GRAVITY UA: 1.02 (ref 1–1.03)
SQUAMOUS EPITHELIAL: <1 /HPF
TOTAL PROTEIN: 6.1 GM/DL (ref 6.4–8.2)
TRICHOMONAS: ABNORMAL /HPF
UROBILINOGEN, URINE: NORMAL MG/DL (ref 0.2–1)
WBC # BLD: 4.9 K/CU MM (ref 4–10.5)
WBC UA: <1 /HPF (ref 0–5)

## 2020-04-02 PROCEDURE — 85025 COMPLETE CBC W/AUTO DIFF WBC: CPT

## 2020-04-02 PROCEDURE — 36591 DRAW BLOOD OFF VENOUS DEVICE: CPT

## 2020-04-02 PROCEDURE — 83615 LACTATE (LD) (LDH) ENZYME: CPT

## 2020-04-02 PROCEDURE — 84165 PROTEIN E-PHORESIS SERUM: CPT

## 2020-04-02 PROCEDURE — 6360000002 HC RX W HCPCS

## 2020-04-02 PROCEDURE — 80053 COMPREHEN METABOLIC PANEL: CPT

## 2020-04-02 PROCEDURE — 86320 SERUM IMMUNOELECTROPHORESIS: CPT

## 2020-04-02 PROCEDURE — 36415 COLL VENOUS BLD VENIPUNCTURE: CPT

## 2020-04-02 PROCEDURE — 82784 ASSAY IGA/IGD/IGG/IGM EACH: CPT

## 2020-04-02 PROCEDURE — 81001 URINALYSIS AUTO W/SCOPE: CPT

## 2020-04-02 RX ORDER — HEPARIN SODIUM (PORCINE) LOCK FLUSH IV SOLN 100 UNIT/ML 100 UNIT/ML
SOLUTION INTRAVENOUS
Status: DISCONTINUED
Start: 2020-04-02 | End: 2020-04-03 | Stop reason: HOSPADM

## 2020-04-03 LAB
ALBUMIN ELP: 3.6 GM/DL (ref 3.2–5.6)
ALPHA-1-GLOBULIN: 0.2 GM/DL (ref 0.1–0.4)
ALPHA-2-GLOBULIN: 0.7 GM/DL (ref 0.4–1.2)
BETA GLOBULIN: 0.9 GM/DL (ref 0.5–1.3)
GAMMA GLOBULIN: 0.7 GM/DL (ref 0.5–1.6)
SPEP INTERPRETATION: ABNORMAL
SPEP INTERPRETATION: NORMAL
TOTAL PROTEIN: 6.1 GM/DL (ref 6.4–8.2)

## 2020-06-16 ENCOUNTER — HOSPITAL ENCOUNTER (OUTPATIENT)
Dept: INFUSION THERAPY | Age: 75
Discharge: HOME | End: 2020-06-16
Payer: MEDICARE

## 2020-06-16 PROCEDURE — 99211 OFF/OP EST MAY X REQ PHY/QHP: CPT

## 2020-10-15 ENCOUNTER — HOSPITAL ENCOUNTER (OUTPATIENT)
Dept: WOMENS IMAGING | Age: 75
Discharge: HOME | End: 2020-10-15
Payer: MEDICARE

## 2020-10-15 PROCEDURE — 77063 BREAST TOMOSYNTHESIS BI: CPT

## 2020-12-09 ENCOUNTER — HOSPITAL ENCOUNTER (OUTPATIENT)
Dept: INFUSION THERAPY | Age: 75
Discharge: HOME | End: 2020-12-09
Payer: MEDICARE

## 2020-12-09 DIAGNOSIS — C83.00 MALIGNANT LYMPHOPLASMACYTIC LYMPHOMA (HCC): Primary | ICD-10-CM

## 2020-12-09 LAB
BASOPHILS ABSOLUTE: 0 K/CU MM
BASOPHILS RELATIVE PERCENT: 0.4 % (ref 0–1)
DIFFERENTIAL TYPE: ABNORMAL
EOSINOPHILS ABSOLUTE: 0.1 K/CU MM
EOSINOPHILS RELATIVE PERCENT: 3.1 % (ref 0–3)
HCT VFR BLD CALC: 39.7 % (ref 37–47)
HEMOGLOBIN: 13.9 GM/DL (ref 12.5–16)
LYMPHOCYTES ABSOLUTE: 1.5 K/CU MM
LYMPHOCYTES RELATIVE PERCENT: 32.7 % (ref 24–44)
MCH RBC QN AUTO: 33.9 PG (ref 27–31)
MCHC RBC AUTO-ENTMCNC: 35 % (ref 32–36)
MCV RBC AUTO: 96.8 FL (ref 78–100)
MONOCYTES ABSOLUTE: 0.4 K/CU MM
MONOCYTES RELATIVE PERCENT: 8.8 % (ref 0–4)
PDW BLD-RTO: 13.8 % (ref 11.7–14.9)
PLATELET # BLD: 125 K/CU MM (ref 140–440)
PMV BLD AUTO: 9.3 FL (ref 7.5–11.1)
RBC # BLD: 4.1 M/CU MM (ref 4.2–5.4)
SEGMENTED NEUTROPHILS ABSOLUTE COUNT: 2.5 K/CU MM
SEGMENTED NEUTROPHILS RELATIVE PERCENT: 55 % (ref 36–66)
WBC # BLD: 4.5 K/CU MM (ref 4–10.5)

## 2020-12-09 PROCEDURE — 36591 DRAW BLOOD OFF VENOUS DEVICE: CPT

## 2020-12-09 PROCEDURE — 2580000003 HC RX 258: Performed by: INTERNAL MEDICINE

## 2020-12-09 PROCEDURE — 6360000002 HC RX W HCPCS: Performed by: INTERNAL MEDICINE

## 2020-12-09 PROCEDURE — 85025 COMPLETE CBC W/AUTO DIFF WBC: CPT

## 2020-12-09 PROCEDURE — 80053 COMPREHEN METABOLIC PANEL: CPT

## 2020-12-09 RX ORDER — SODIUM CHLORIDE 0.9 % (FLUSH) 0.9 %
10 SYRINGE (ML) INJECTION PRN
Status: CANCELLED | OUTPATIENT
Start: 2020-12-09

## 2020-12-09 RX ORDER — HEPARIN SODIUM (PORCINE) LOCK FLUSH IV SOLN 100 UNIT/ML 100 UNIT/ML
500 SOLUTION INTRAVENOUS PRN
Status: DISCONTINUED | OUTPATIENT
Start: 2020-12-09 | End: 2020-12-10 | Stop reason: HOSPADM

## 2020-12-09 RX ORDER — HEPARIN SODIUM (PORCINE) LOCK FLUSH IV SOLN 100 UNIT/ML 100 UNIT/ML
500 SOLUTION INTRAVENOUS PRN
Status: CANCELLED | OUTPATIENT
Start: 2020-12-09

## 2020-12-09 RX ORDER — SODIUM CHLORIDE 0.9 % (FLUSH) 0.9 %
20 SYRINGE (ML) INJECTION PRN
Status: CANCELLED | OUTPATIENT
Start: 2020-12-09

## 2020-12-09 RX ORDER — SODIUM CHLORIDE 0.9 % (FLUSH) 0.9 %
20 SYRINGE (ML) INJECTION PRN
Status: DISCONTINUED | OUTPATIENT
Start: 2020-12-09 | End: 2020-12-10 | Stop reason: HOSPADM

## 2020-12-09 RX ADMIN — Medication 500 UNITS: at 10:15

## 2020-12-09 RX ADMIN — SODIUM CHLORIDE, PRESERVATIVE FREE 20 ML: 5 INJECTION INTRAVENOUS at 10:15

## 2020-12-10 LAB
ALBUMIN SERPL-MCNC: 4.3 GM/DL (ref 3.4–5)
ALP BLD-CCNC: 40 IU/L (ref 40–128)
ALT SERPL-CCNC: 22 U/L (ref 10–40)
ANION GAP SERPL CALCULATED.3IONS-SCNC: 12 MMOL/L (ref 4–16)
AST SERPL-CCNC: 22 IU/L (ref 15–37)
BILIRUB SERPL-MCNC: 0.4 MG/DL (ref 0–1)
BUN BLDV-MCNC: 21 MG/DL (ref 6–23)
CALCIUM SERPL-MCNC: 9.5 MG/DL (ref 8.3–10.6)
CHLORIDE BLD-SCNC: 104 MMOL/L (ref 99–110)
CO2: 25 MMOL/L (ref 21–32)
CREAT SERPL-MCNC: 0.8 MG/DL (ref 0.6–1.1)
GFR AFRICAN AMERICAN: >60 ML/MIN/1.73M2
GFR NON-AFRICAN AMERICAN: >60 ML/MIN/1.73M2
GLUCOSE BLD-MCNC: 167 MG/DL (ref 70–99)
POTASSIUM SERPL-SCNC: 3.8 MMOL/L (ref 3.5–5.1)
SODIUM BLD-SCNC: 141 MMOL/L (ref 135–145)
TOTAL PROTEIN: 6 GM/DL (ref 6.4–8.2)

## 2020-12-15 NOTE — PROGRESS NOTES
Patient Name: Jamin Olivera  Patient : 1945  Patient MRN: [id number]     Primary Oncologist: Mason Smyth MD  Referring Provider: Jonas Buckner MD     Date of Service: 2020      Chief Complaint:   Chief Complaint   Patient presents with    Follow-up     Patient Active Problem List:     Restless leg syndrome     Hypertension     Diabetes mellitus with neuropathy ([address]     Malignant lymphoplasmacytic lymphoma ([address]     Osteopenia     Gastroesophageal reflux disease without esophagitis     Insomnia     Osteoporosis    HPI:  (Visit was cancelled since she doesn't have proper labs yet)  Ms. Ryan Hidalgo is a 52-year-old very pleasant patient with medical history significant for hypertension, diabetes mellitus, uterine cancer status post hysterectomy in , and chronic kidney disease and discoid lupus erythematosus of eyelid, initially referred to me on 2015, for evaluation of monoclonal gammopathy to rule out plasma cell dyscrasia. Ms. Ryan Hidalgo stated that she was seen by Dr. Yoon Plascencia for proteinuria. Dr. Yoon Plascencia recognized that the patient has elevated gamma globulin on complete metabolic panel and he sent for serum protein electrophoresis and serum free light chain assay. She was found to have 3.17 grams of monoclonal protein on serum protein electrophoresis. Her serum Kappa light chain was more than 700 and Kappa Lambda ratio was more than 70. Because of significant monoclonal protein, she was subsequently referred to me for further evaluation. Laboratory workups done on 2015, showed 2.4 grams of IgM Kappa monoclonal protein on serum protein electrophoresis and immunofixation. LDH was 147, serum Kappa light chain was 121 mg/dL and Kappa Lambda ratio was 327.03. Bone marrow biopsy showed clonal B-cell population detected (26 percent of analyzed cells), CD5 negative, CD10 negative with Kappa light chain restriction. Plasma cells are essentially absent. FISH study was negative. Second opinion from the bone marrow Pathologist at the [facility] confirmed that Ms. Kenneth Perry has lymphoplasmacytic lymphoma. Additional workup showed IgM level of 5362 mg/dL, which is significantly elevated. She also has decreased IgG and IgA level. Ms. Kenneth Perry had CT scan of the chest, abdomen, and pelvis on August 11, 2015, and it showed no acute abnormality in the chest, abdomen, or pelvis. She has cholelithiasis, left kidney cyst, and mild bladder wall thickening which is non-specific. Since Ms. Kenneth Perry has hyperviscosity state, chemotherapy with fludarabine and Rituximab was started on August 24, 2015 and she has completed her fourth cycle on December 16, 2015. On June 16, 2020, she presented to me for followup. I have been following Ms. Kenneth Perry for lymphoplasmacytic lymphoma (Waldenstrom macroglobulinemia) and she is status post four cycles of chemotherapy with fludarabine and rituximab. She has been under close observation since she completed the chemotherapy. She achieved partial response to chemotherapy and her monoclonal protein has been quite stable since then. She does not have any signs or symptoms suggestive of hyperviscosity state and her monoclonal protein was only 200 mg/dL on serum protein electrophoresis done on April 2, 2020. Her IgM level was 431 mg/dL only. She doesn't have any lymphadenoapathy or hepatosplenomegaly on physical examination. Since she is completely asymptomatic and her monoclonal protein has been stable, I recommend to continue with close observation only. No additional workups or interventions needed at this moment. 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 I will continue to follow that closely. I will see her again in 6 months and I will repeat all the blood tests again a week before her next appointment.         Previous Therapies    PAST MEDICAL HISTORY:  Significant for: 1.         Hypertension. 2.         Diabetes mellitus. 3.         Uterine cancer. 4.         Discoid lupus. PAST SURGICAL HISTORY:  Significant for:  1. Hysterectomy in 1988. FAMILY HISTORY:  Significant for breast cancer in her mother and liver cancer in her father. No other family history of cancer disease. SOCIAL HISTORY:  She was a former smoker and she quit smoking in 1966. She denies alcohol drinking and illicit drug abuse. ALLERGIES:  She claims to have allergies to latex. No known drug allergies. Oncology History    No history exists. Review of Systems: \"Per interval history; otherwise 10 point ROS is negative. \"  Her energy level is fair, appetite, and sleep are fine. She doesn't have fever, chills, night sweats, cough, shortness of breath, chest pain, hemoptysis, or palpitations. Her bowel and bladder functions are normal. She denies nausea, vomiting, abdominal pain, diarrhea, constipation, dysuria, loss of appetite, or weight loss. She doesn't have neuropathy and does not have bleeding or clotting issues. She denies any pain in her body. No anxiety or depression. The rest of the systems are unremarkable.      Vital Signs:  /73 (Site: Right Upper Arm, Position: Sitting, Cuff Size: Medium Adult)   Pulse 75   Temp 96.8 °F (36 °C) (Infrared)   Resp 16   Ht 5' 2\" (1.575 m)   Wt 150 lb 12.8 oz (68.4 kg)   SpO2 97%   BMI 27.58 kg/m²     Physical Exam:  CONSTITUTIONAL: awake, alert, cooperative, no apparent distress   EYES: pupils equal, round and reactive to light, sclera clear and conjunctiva normal  ENT: Normocephalic, without obvious abnormality, atraumatic  NECK: supple, symmetrical, no jugular venous distension and no carotid bruits   HEMATOLOGIC/LYMPHATIC: no cervical, supraclavicular or axillary lymphadenopathy   LUNGS: no increased work of breathing and clear to auscultation   CARDIOVASCULAR: regular rate and rhythm, normal S1 and S2, no murmur noted ABDOMEN: normal bowel sounds x 4, soft, non-distended, non-tender, no masses palpated, no hepatosplenomegaly   MUSCULOSKELETAL: full range of motion noted, tone is normal  NEUROLOGIC: awake, alert, oriented to name, place and time. Motor skills grossly intact. SKIN: Normal skin color, texture, turgor and no jaundice.  appears intact   EXTREMITIES: no LE edema     Labs:  Hematology:  Lab Results   Component Value Date    WBC 4.6 12/16/2020    RBC 4.33 12/16/2020    HGB 14.7 12/16/2020    HCT 41.9 12/16/2020    MCV 96.8 12/16/2020    MCH 33.9 (H) 12/16/2020    MCHC 35.1 12/16/2020    RDW 13.7 12/16/2020     (L) 12/16/2020    MPV 9.0 12/16/2020    BANDSPCT 6 07/23/2015    SEGSPCT 57.9 12/16/2020    EOSRELPCT 2.6 12/16/2020    BASOPCT 0.4 12/16/2020    LYMPHOPCT 29.5 12/16/2020    MONOPCT 9.6 (H) 12/16/2020    BANDABS 0.29 07/23/2015    SEGSABS 2.6 12/16/2020    EOSABS 0.1 12/16/2020    BASOSABS 0.0 12/16/2020    LYMPHSABS 1.4 12/16/2020    MONOSABS 0.4 12/16/2020    DIFFTYPE AUTOMATED DIFFERENTIAL 12/16/2020    WBCMORP OCCASIONAL 07/23/2015     Lab Results   Component Value Date     (H) 07/23/2015     Chemistry:  Lab Results   Component Value Date     12/09/2020    K 3.8 12/09/2020     12/09/2020    CO2 25 12/09/2020    BUN 21 12/09/2020    CREATININE 0.8 12/09/2020    GLUCOSE 167 (H) 12/09/2020    CALCIUM 9.5 12/09/2020    PROT 6.0 (L) 12/09/2020    LABALBU 4.3 12/09/2020    BILITOT 0.4 12/09/2020    ALKPHOS 40 12/09/2020    AST 22 12/09/2020    ALT 22 12/09/2020    LABGLOM >60 12/09/2020    GFRAA >60 12/09/2020    AGRATIO 1.9 08/16/2016    GLOB 2.2 08/16/2016    POCGLU 145 (H) 09/01/2015     Lab Results   Component Value Date     04/02/2020     No components found for: LD  Lab Results   Component Value Date    TSHHS 1.120 08/31/2015     Immunology:  Lab Results   Component Value Date    PROT 6.0 (L) 12/09/2020    SPEP  04/02/2020 INTERPRETATION - MOnoclonal gammopathy, IgM kappa. RS    ALBUMINELP 3.6 04/02/2020    LABALPH 0.2 04/02/2020    LABALPH 0.7 04/02/2020    LABBETA 0.9 04/02/2020    GAMGLOB 0.7 04/02/2020     Lab Results   Component Value Date    KAPPAUVOL 45.90 (H) 12/07/2015    LAMBDAUVOL 0.61 12/07/2015    KLFLCR 75.25 (H) 12/07/2015     Lab Results   Component Value Date    B2M 2.7 (H) 12/07/2015     Coagulation Panel:  Lab Results   Component Value Date    PROTIME 12.5 08/17/2015    INR 1.10 08/17/2015    APTT 31.8 08/17/2015    DDIMER 711 (H) 08/31/2015     Anemia Panel:  Lab Results   Component Value Date    CZKOSBDC35 642.9 08/31/2015    FOLATE 17.3 08/31/2015     Tumor Markers:  No results found for: , CEA, , LABCA2, PSA  Observations:  No data recorded      Assessment & Plan:   Lymphoplasmacytic lymphoma (Waldenstrom's macroglobulinemia). PLAN:  Overall Ms. Kilye Mckeon is feeling quite well and does not have any significant new symptoms on today's visit. I have been following Ms. Kiley Mckeon for lymphoplasmacytic lymphoma (Waldenstrom macroglobulinemia) and she is status post four cycles of chemotherapy with fludarabine and rituximab. She has been under close observation since she completed the chemotherapy. She achieved partial response to chemotherapy and her monoclonal protein has been quite stable since then. She does not have any signs or symptoms suggestive of hyperviscosity state and her monoclonal protein was only 200 mg/dL on serum protein electrophoresis done on April 2, 2020. Her IgM level was 431 mg/dL only. She doesn't have any lymphadenoapathy or hepatosplenomegaly on physical examination. Since she is completely asymptomatic and her monoclonal protein has been stable, I recommend to continue with close observation only. No additional workups or interventions needed at this moment. 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 I will continue to follow that closely. I will see her again in 6 months and I will repeat all the blood tests again a week before her next appointment. I have reviewed all these plans with Ms. Prince Mary and she is in agreement with the plans. I answered all her questions and concerns for today. I asked her to follow up with primary care physician, Dr. Eben Lopez and Nephrologist, Dr. Luciano Jaimes on regular basis. I will of course continue to keep you updated on her progress. Thank you for allowing me to participate in the care of this very pleasant patient. Recent imaging and labs were reviewed and discussed with the patient.

## 2020-12-16 ENCOUNTER — HOSPITAL ENCOUNTER (OUTPATIENT)
Dept: INFUSION THERAPY | Age: 75
Discharge: HOME | End: 2020-12-16
Payer: MEDICARE

## 2020-12-16 ENCOUNTER — OFFICE VISIT (OUTPATIENT)
Dept: ONCOLOGY | Age: 75
End: 2020-12-16

## 2020-12-16 VITALS
TEMPERATURE: 96.8 F | SYSTOLIC BLOOD PRESSURE: 127 MMHG | HEIGHT: 62 IN | DIASTOLIC BLOOD PRESSURE: 73 MMHG | OXYGEN SATURATION: 97 % | HEART RATE: 75 BPM | WEIGHT: 150.8 LBS | BODY MASS INDEX: 27.75 KG/M2 | RESPIRATION RATE: 16 BRPM

## 2020-12-16 DIAGNOSIS — C83.00 MALIGNANT LYMPHOPLASMACYTIC LYMPHOMA (HCC): Primary | ICD-10-CM

## 2020-12-16 LAB
BASOPHILS ABSOLUTE: 0 K/CU MM
BASOPHILS RELATIVE PERCENT: 0.4 % (ref 0–1)
DIFFERENTIAL TYPE: ABNORMAL
EOSINOPHILS ABSOLUTE: 0.1 K/CU MM
EOSINOPHILS RELATIVE PERCENT: 2.6 % (ref 0–3)
HCT VFR BLD CALC: 41.9 % (ref 37–47)
HEMOGLOBIN: 14.7 GM/DL (ref 12.5–16)
IGA: 167 MG/DL (ref 69–382)
IGG,SERUM: <300 MG/DL (ref 723–1685)
IGM,SERUM: 403 MG/DL (ref 62–277)
LYMPHOCYTES ABSOLUTE: 1.4 K/CU MM
LYMPHOCYTES RELATIVE PERCENT: 29.5 % (ref 24–44)
MCH RBC QN AUTO: 33.9 PG (ref 27–31)
MCHC RBC AUTO-ENTMCNC: 35.1 % (ref 32–36)
MCV RBC AUTO: 96.8 FL (ref 78–100)
MONOCYTES ABSOLUTE: 0.4 K/CU MM
MONOCYTES RELATIVE PERCENT: 9.6 % (ref 0–4)
PDW BLD-RTO: 13.7 % (ref 11.7–14.9)
PLATELET # BLD: 123 K/CU MM (ref 140–440)
PMV BLD AUTO: 9 FL (ref 7.5–11.1)
RBC # BLD: 4.33 M/CU MM (ref 4.2–5.4)
SEGMENTED NEUTROPHILS ABSOLUTE COUNT: 2.6 K/CU MM
SEGMENTED NEUTROPHILS RELATIVE PERCENT: 57.9 % (ref 36–66)
WBC # BLD: 4.6 K/CU MM (ref 4–10.5)

## 2020-12-16 PROCEDURE — 85025 COMPLETE CBC W/AUTO DIFF WBC: CPT

## 2020-12-16 PROCEDURE — 84165 PROTEIN E-PHORESIS SERUM: CPT

## 2020-12-16 PROCEDURE — 86320 SERUM IMMUNOELECTROPHORESIS: CPT

## 2020-12-16 PROCEDURE — 82232 ASSAY OF BETA-2 PROTEIN: CPT

## 2020-12-16 PROCEDURE — 82784 ASSAY IGA/IGD/IGG/IGM EACH: CPT

## 2020-12-16 PROCEDURE — 36591 DRAW BLOOD OFF VENOUS DEVICE: CPT

## 2020-12-16 PROCEDURE — 99999 PR OFFICE/OUTPT VISIT,PROCEDURE ONLY: CPT | Performed by: INTERNAL MEDICINE

## 2020-12-16 PROCEDURE — 99211 OFF/OP EST MAY X REQ PHY/QHP: CPT

## 2020-12-16 PROCEDURE — 2580000003 HC RX 258: Performed by: INTERNAL MEDICINE

## 2020-12-16 PROCEDURE — 83883 ASSAY NEPHELOMETRY NOT SPEC: CPT

## 2020-12-16 PROCEDURE — 84155 ASSAY OF PROTEIN SERUM: CPT

## 2020-12-16 PROCEDURE — 86334 IMMUNOFIX E-PHORESIS SERUM: CPT

## 2020-12-16 PROCEDURE — 6360000002 HC RX W HCPCS: Performed by: INTERNAL MEDICINE

## 2020-12-16 RX ORDER — SIMVASTATIN 5 MG
5 TABLET ORAL NIGHTLY
COMMUNITY

## 2020-12-16 RX ORDER — SODIUM CHLORIDE 0.9 % (FLUSH) 0.9 %
20 SYRINGE (ML) INJECTION PRN
Status: CANCELLED | OUTPATIENT
Start: 2020-12-16

## 2020-12-16 RX ORDER — SODIUM CHLORIDE 0.9 % (FLUSH) 0.9 %
20 SYRINGE (ML) INJECTION PRN
Status: DISCONTINUED | OUTPATIENT
Start: 2020-12-16 | End: 2020-12-17 | Stop reason: HOSPADM

## 2020-12-16 RX ORDER — SODIUM CHLORIDE 0.9 % (FLUSH) 0.9 %
10 SYRINGE (ML) INJECTION PRN
Status: CANCELLED | OUTPATIENT
Start: 2020-12-16

## 2020-12-16 RX ORDER — HEPARIN SODIUM (PORCINE) LOCK FLUSH IV SOLN 100 UNIT/ML 100 UNIT/ML
500 SOLUTION INTRAVENOUS PRN
Status: CANCELLED | OUTPATIENT
Start: 2020-12-16

## 2020-12-16 RX ORDER — HEPARIN SODIUM (PORCINE) LOCK FLUSH IV SOLN 100 UNIT/ML 100 UNIT/ML
500 SOLUTION INTRAVENOUS PRN
Status: DISCONTINUED | OUTPATIENT
Start: 2020-12-16 | End: 2020-12-17 | Stop reason: HOSPADM

## 2020-12-16 RX ADMIN — SODIUM CHLORIDE, PRESERVATIVE FREE 20 ML: 5 INJECTION INTRAVENOUS at 10:41

## 2020-12-16 RX ADMIN — Medication 500 UNITS: at 10:41

## 2020-12-16 NOTE — PROGRESS NOTES
MA Rooming Questions  Patient: Maru Mtz  MRN: [id number]    Date: 12/16/2020        1. Do you have any new issues? yes - has been getting headaches often         2. Do you need any refills on medications?    no    3. Have you had any imaging done since your last visit?   no    4. Have you been hospitalized or seen in the emergency room since your last visit here?   no    5. Did the patient have a depression screening completed today?  No    No data recorded     PHQ-9 Given to (if applicable):               PHQ-9 Score (if applicable):                     [] Positive     []  Negative              Does question #9 need addressed (if applicable)                     [] Yes    []  No               Thony Warren MA

## 2020-12-16 NOTE — PROGRESS NOTES
1040: RN Assessment    Pt here for treatment, A&OX3. No new issues to report    Pt saw Dr. Keesha Sanchez today, add on for portdraw post visit. Right chest wall port, accessed with a 20 gauge 1inch Harris needle, labs drawn as ordered. Flushed with 20ml NS and heparin and deaccessed, bandaid to site.

## 2020-12-17 LAB
BETA-2 MICROGLOBULIN: 2.6 MG/L (ref 1.1–2.4)
KAPPA QUANT FREE LIGHT CHAINS: 58.31 MG/L (ref 3.3–19.4)
KAPPA/LAMBDA FREE LIGHT CHAIN RATIO: 5.9 (ref 0.26–1.65)
LAMBDA FREE LIGHT CHAINS QNT: 9.89 MG/L (ref 5.71–26.3)

## 2020-12-19 LAB
ALBUMIN SERPL-MCNC: 4.11 G/DL (ref 3.75–5.01)
ALPHA-1-GLOBULIN: 0.27 G/DL (ref 0.19–0.46)
ALPHA-2-GLOBULIN: 0.67 G/DL (ref 0.48–1.05)
BETA GLOBULIN: 0.7 G/DL (ref 0.48–1.1)
GAMMA: 0.55 G/DL (ref 0.62–1.51)
IMMUNOFIXATION REFLEX: ABNORMAL
PROTEIN ELECTROPHORESIS, SERUM: ABNORMAL
SPE/IFE INTERPRETATION: ABNORMAL
TOTAL PROTEIN: 6.3 G/DL (ref 6.3–8.2)

## 2021-01-03 NOTE — PROGRESS NOTES
Patient Name: Tonya Loera  Patient : 1945  Patient MRN: [id number]     Primary Oncologist: Juan Manuel Payne MD  Referring Provider: Joshua Burns MD     Date of Service: 2021      Chief Complaint:   No chief complaint on file. Patient Active Problem List:     Restless leg syndrome     Hypertension     Diabetes mellitus with neuropathy (HCC)     Malignant lymphoplasmacytic lymphoma (HCC)     Osteopenia     Gastroesophageal reflux disease without esophagitis     Insomnia     Osteoporosis    HPI:  (Televisit)  Ms. Ally Oconnor is a 78-year-old very pleasant patient with medical history significant for hypertension, diabetes mellitus, uterine cancer status post hysterectomy in , and chronic kidney disease and discoid lupus erythematosus of eyelid, initially referred to me on 2015, for evaluation of monoclonal gammopathy to rule out plasma cell dyscrasia. Ms. Ally Oconnor stated that she was seen by Dr. Surendra Harris for proteinuria. Dr. Surendra Harris recognized that the patient has elevated gamma globulin on complete metabolic panel and he sent for serum protein electrophoresis and serum free light chain assay. She was found to have 3.17 grams of monoclonal protein on serum protein electrophoresis. Her serum Kappa light chain was more than 700 and Kappa Lambda ratio was more than 70. Because of significant monoclonal protein, she was subsequently referred to me for further evaluation. Laboratory workups done on 2015, showed 2.4 grams of IgM Kappa monoclonal protein on serum protein electrophoresis and immunofixation. LDH was 147, serum Kappa light chain was 121 mg/dL and Kappa Lambda ratio was 327.03. Bone marrow biopsy showed clonal B-cell population detected (26 percent of analyzed cells), CD5 negative, CD10 negative with Kappa light chain restriction. Plasma cells are essentially absent. FISH study was negative.       Second opinion from the bone marrow Pathologist at the [facility] confirmed that Ms. Irvin Cope has lymphoplasmacytic lymphoma. Additional workup showed IgM level of 5362 mg/dL, which is significantly elevated. She also has decreased IgG and IgA level. Ms. Irvin Cope had CT scan of the chest, abdomen, and pelvis on August 11, 2015, and it showed no acute abnormality in the chest, abdomen, or pelvis. She has cholelithiasis, left kidney cyst, and mild bladder wall thickening which is non-specific. Since Ms. Irvin Cope has hyperviscosity state, chemotherapy with fludarabine and Rituximab was started on August 24, 2015 and she has completed her fourth cycle on December 16, 2015. On January 7, 2021, she presented to me for followup. I have been following Ms. Irvin Cope for lymphoplasmacytic lymphoma (Waldenstrom macroglobulinemia) and she is status post four cycles of chemotherapy with fludarabine and rituximab. She has been under close observation since she completed the chemotherapy. She achieved partial response to chemotherapy and her monoclonal protein has been quite stable since then. She does not have any signs or symptoms suggestive of hyperviscosity state and her monoclonal protein was only 260 mg/dL on serum protein electrophoresis done on 12/16/2020. Her IgM level was 403 mg/dL only. She has mild thrombocytopenia (123,000/cumm). 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 Since she is completely asymptomatic and her monoclonal protein has been stable, I recommend to continue with close observation only. No additional workups or interventions needed at this moment. Hypertension - on losartan/HCTZ. Diabetes - on insulin degludec and novolog. Hyperlipidemia - on zocor    I will see her again in 6 months and I will repeat all the blood tests again a week before her next appointment. PAST MEDICAL HISTORY:  Significant for:  1. Hypertension. 2.         Diabetes mellitus.   3.         Uterine cancer. 4.         Discoid lupus. PAST SURGICAL HISTORY:  Significant for:  1. Hysterectomy in 1988. FAMILY HISTORY:  Significant for breast cancer in her mother and liver cancer in her father. No other family history of cancer disease. SOCIAL HISTORY:  She was a former smoker and she quit smoking in 1966. She denies alcohol drinking and illicit drug abuse. ALLERGIES:  She claims to have allergies to latex. No known drug allergies. Oncology History    No history exists. Review of Systems: \"Per interval history; otherwise 10 point ROS is negative. \"  Her energy level is pretty good, appetite, and sleep are stable. She denies fever, chills, night sweats, cough, shortness of breath, chest pain, hemoptysis, or palpitations. Her bowel and bladder functions are normal. She doesn't have nausea, vomiting, abdominal pain, diarrhea, constipation, dysuria, loss of appetite, or weight loss. She denies neuropathy and she does not have bleeding or clotting issues. She doesn't have any pain in her body. Denies anxiety or depression. The rest of the systems are unremarkable. Vital Signs: There were no vitals taken for this visit.     Physical Exam:    Labs:  Hematology:  Lab Results   Component Value Date    WBC 4.6 12/16/2020    RBC 4.33 12/16/2020    HGB 14.7 12/16/2020    HCT 41.9 12/16/2020    MCV 96.8 12/16/2020    MCH 33.9 (H) 12/16/2020    MCHC 35.1 12/16/2020    RDW 13.7 12/16/2020     (L) 12/16/2020    MPV 9.0 12/16/2020    BANDSPCT 6 07/23/2015    SEGSPCT 57.9 12/16/2020    EOSRELPCT 2.6 12/16/2020    BASOPCT 0.4 12/16/2020    LYMPHOPCT 29.5 12/16/2020    MONOPCT 9.6 (H) 12/16/2020    BANDABS 0.29 07/23/2015    SEGSABS 2.6 12/16/2020    EOSABS 0.1 12/16/2020    BASOSABS 0.0 12/16/2020    LYMPHSABS 1.4 12/16/2020    MONOSABS 0.4 12/16/2020    DIFFTYPE AUTOMATED DIFFERENTIAL 12/16/2020    [facility] OCCASIONAL 07/23/2015     Lab Results   Component Value Date     (H) 07/23/2015     Chemistry:  Lab Results   Component Value Date     12/09/2020    K 3.8 12/09/2020     12/09/2020    CO2 25 12/09/2020    BUN 21 12/09/2020    CREATININE 0.8 12/09/2020    GLUCOSE 167 (H) 12/09/2020    CALCIUM 9.5 12/09/2020    PROT 6.3 12/16/2020    LABALBU 4.11 12/16/2020    BILITOT 0.4 12/09/2020    ALKPHOS 40 12/09/2020    AST 22 12/09/2020    ALT 22 12/09/2020    LABGLOM >60 12/09/2020    GFRAA >60 12/09/2020    AGRATIO 1.9 08/16/2016    GLOB 2.2 08/16/2016    POCGLU 145 (H) 09/01/2015     Lab Results   Component Value Date     04/02/2020     No components found for: LD  Lab Results   Component Value Date    TSHHS 1.120 08/31/2015     Immunology:  Lab Results   Component Value Date    PROT 6.3 12/16/2020    SPEP  04/02/2020     INTERPRETATION - MOnoclonal gammopathy, IgM kappa. RS    ALBUMINELP 3.6 04/02/2020    LABALPH 0.27 12/16/2020    LABALPH 0.67 12/16/2020    LABBETA 0.70 12/16/2020    GAMGLOB 0.7 04/02/2020     Lab Results   Component Value Date    KAPPAUVOL 58.31 (H) 12/16/2020    LAMBDAUVOL 9.89 12/16/2020    KLFLCR 5.90 (H) 12/16/2020     Lab Results   Component Value Date    B2M 2.6 (H) 12/16/2020     Coagulation Panel:  Lab Results   Component Value Date    PROTIME 12.5 08/17/2015    INR 1.10 08/17/2015    APTT 31.8 08/17/2015    DDIMER 711 (H) 08/31/2015     Anemia Panel:  Lab Results   Component Value Date    UZOKVRCH35 642.9 08/31/2015    FOLATE 17.3 08/31/2015     Tumor Markers:  No results found for: , CEA, , LABCA2, PSA  Observations:  No data recorded      Assessment & Plan:   Lymphoplasmacytic lymphoma (Waldenstrom's macroglobulinemia). PLAN:  [facility] has been followed for lymphoplasmacytic lymphoma (Waldenstrom macroglobulinemia) and she is status post four cycles of chemotherapy with fludarabine and rituximab. On January 7, 2021, she presented to me for followup. I have been following [facility] for lymphoplasmacytic lymphoma (Waldenstrom macroglobulinemia) and she is status post four cycles of chemotherapy with fludarabine and rituximab. She has been under close observation since she completed the chemotherapy. She achieved partial response to chemotherapy and her monoclonal protein has been quite stable since then. She does not have any signs or symptoms suggestive of hyperviscosity state and her monoclonal protein was only 260 mg/dL on serum protein electrophoresis done on 12/16/2020. Her IgM level was 403 mg/dL only. She has mild thrombocytopenia (123,000/cumm). 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 Since she is completely asymptomatic and her monoclonal protein has been stable, I recommend to continue with close observation only. No additional workups or interventions needed at this moment. Hypertension - on losartan/HCTZ. Diabetes - on insulin degludec and novolog. Hyperlipidemia - on zocor    I will see her again in 6 months and I will repeat all the blood tests again a week before her next appointment. I answered all her questions and concerns for today. I asked her to follow up with primary care physician, Dr. Sen Foley and Nephrologist, Dr. France Banks on regular basis. Recent imaging and labs were reviewed and discussed with the patient.    Total time spent with patient - 12 minutes

## 2021-01-07 ENCOUNTER — OFFICE VISIT (OUTPATIENT)
Dept: ONCOLOGY | Age: 76
End: 2021-01-07
Payer: MEDICARE

## 2021-01-07 DIAGNOSIS — C83.00 MALIGNANT LYMPHOPLASMACYTIC LYMPHOMA (HCC): Primary | ICD-10-CM

## 2021-01-07 PROCEDURE — 99422 OL DIG E/M SVC 11-20 MIN: CPT | Performed by: INTERNAL MEDICINE

## 2021-01-07 ASSESSMENT — PATIENT HEALTH QUESTIONNAIRE - PHQ9
SUM OF ALL RESPONSES TO PHQ QUESTIONS 1-9: 1
SUM OF ALL RESPONSES TO PHQ9 QUESTIONS 1 & 2: 1
1. LITTLE INTEREST OR PLEASURE IN DOING THINGS: 0
2. FEELING DOWN, DEPRESSED OR HOPELESS: 1

## 2021-01-07 NOTE — PROGRESS NOTES
MA Rooming Questions  Patient: Rafael Maloney  MRN: [id number]    Date: 1/7/2021        1. Do you have any new issues? yes - Patient states she has been having headaches but she thinks it is due to the weather change and sinus's. 2. Do you need any refills on medications?    no    3. Have you had any imaging done since your last visit? yes - bloodwork    4. Have you been hospitalized or seen in the emergency room since your last visit here?   no    5. Did the patient have a depression screening completed today?  Yes    PHQ-9 Total Score: 1 (1/7/2021  9:53 AM)       PHQ-9 Given to (if applicable):               PHQ-9 Score (if applicable):                     [] Positive     []  Negative              Does question #9 need addressed (if applicable)                     [] Yes    []  No               Sharon Pal MA

## 2021-01-13 LAB
EER IMMUNOFIX ELECTROPHORESIS GEL: NORMAL
IMMUNOFIX ELECTROPHORESIS GEL: NORMAL

## 2021-03-10 ENCOUNTER — HOSPITAL ENCOUNTER (EMERGENCY)
Age: 76
Discharge: HOME | DRG: 454 | End: 2021-03-10
Attending: EMERGENCY MEDICINE
Payer: MEDICARE

## 2021-03-10 ENCOUNTER — APPOINTMENT (OUTPATIENT)
Dept: GENERAL RADIOLOGY | Age: 76
DRG: 454 | End: 2021-03-10
Payer: MEDICARE

## 2021-03-10 VITALS
WEIGHT: 150 LBS | BODY MASS INDEX: 28.32 KG/M2 | TEMPERATURE: 96.9 F | DIASTOLIC BLOOD PRESSURE: 73 MMHG | HEART RATE: 60 BPM | HEIGHT: 61 IN | RESPIRATION RATE: 20 BRPM | SYSTOLIC BLOOD PRESSURE: 134 MMHG | OXYGEN SATURATION: 98 %

## 2021-03-10 DIAGNOSIS — M54.9 BACK PAIN, UNSPECIFIED BACK LOCATION, UNSPECIFIED BACK PAIN LATERALITY, UNSPECIFIED CHRONICITY: Primary | ICD-10-CM

## 2021-03-10 DIAGNOSIS — M51.36 DEGENERATIVE DISC DISEASE, LUMBAR: ICD-10-CM

## 2021-03-10 PROCEDURE — 96372 THER/PROPH/DIAG INJ SC/IM: CPT

## 2021-03-10 PROCEDURE — 73502 X-RAY EXAM HIP UNI 2-3 VIEWS: CPT

## 2021-03-10 PROCEDURE — 99283 EMERGENCY DEPT VISIT LOW MDM: CPT

## 2021-03-10 PROCEDURE — 72100 X-RAY EXAM L-S SPINE 2/3 VWS: CPT

## 2021-03-10 PROCEDURE — 6360000002 HC RX W HCPCS: Performed by: EMERGENCY MEDICINE

## 2021-03-10 PROCEDURE — 6370000000 HC RX 637 (ALT 250 FOR IP): Performed by: EMERGENCY MEDICINE

## 2021-03-10 RX ORDER — METHOCARBAMOL 500 MG/1
500 TABLET, FILM COATED ORAL ONCE
Status: COMPLETED | OUTPATIENT
Start: 2021-03-10 | End: 2021-03-10

## 2021-03-10 RX ORDER — HYDROCODONE BITARTRATE AND ACETAMINOPHEN 5; 325 MG/1; MG/1
1 TABLET ORAL EVERY 6 HOURS PRN
Qty: 12 TABLET | Refills: 0 | Status: ON HOLD | OUTPATIENT
Start: 2021-03-10 | End: 2021-03-18 | Stop reason: HOSPADM

## 2021-03-10 RX ORDER — MORPHINE SULFATE 4 MG/ML
4 INJECTION, SOLUTION INTRAMUSCULAR; INTRAVENOUS ONCE
Status: COMPLETED | OUTPATIENT
Start: 2021-03-10 | End: 2021-03-10

## 2021-03-10 RX ORDER — LIDOCAINE 4 G/G
1 PATCH TOPICAL DAILY
Status: DISCONTINUED | OUTPATIENT
Start: 2021-03-10 | End: 2021-03-10 | Stop reason: HOSPADM

## 2021-03-10 RX ORDER — VALSARTAN 160 MG/1
160 TABLET ORAL DAILY
COMMUNITY
End: 2021-03-12 | Stop reason: CLARIF

## 2021-03-10 RX ORDER — METHOCARBAMOL 500 MG/1
500 TABLET, FILM COATED ORAL 4 TIMES DAILY PRN
Qty: 40 TABLET | Refills: 0 | Status: SHIPPED | OUTPATIENT
Start: 2021-03-10 | End: 2021-03-20

## 2021-03-10 RX ORDER — HYDROCODONE BITARTRATE AND ACETAMINOPHEN 5; 325 MG/1; MG/1
1 TABLET ORAL ONCE
Status: COMPLETED | OUTPATIENT
Start: 2021-03-10 | End: 2021-03-10

## 2021-03-10 RX ORDER — LIDOCAINE 50 MG/G
1 PATCH TOPICAL DAILY
Qty: 10 PATCH | Refills: 0 | Status: SHIPPED | OUTPATIENT
Start: 2021-03-10 | End: 2021-03-20

## 2021-03-10 RX ADMIN — HYDROCODONE BITARTRATE AND ACETAMINOPHEN 1 TABLET: 5; 325 TABLET ORAL at 10:34

## 2021-03-10 RX ADMIN — MORPHINE SULFATE 4 MG: 4 INJECTION, SOLUTION INTRAMUSCULAR; INTRAVENOUS at 11:31

## 2021-03-10 RX ADMIN — METHOCARBAMOL 500 MG: 500 TABLET ORAL at 10:34

## 2021-03-10 ASSESSMENT — PAIN DESCRIPTION - ORIENTATION
ORIENTATION: RIGHT
ORIENTATION: RIGHT

## 2021-03-10 ASSESSMENT — ENCOUNTER SYMPTOMS
COUGH: 0
SHORTNESS OF BREATH: 0
EYE PAIN: 0
BACK PAIN: 1
RHINORRHEA: 0
VOMITING: 0
SORE THROAT: 0
EYE DISCHARGE: 0
NAUSEA: 0
ABDOMINAL PAIN: 0

## 2021-03-10 ASSESSMENT — PAIN DESCRIPTION - FREQUENCY: FREQUENCY: CONTINUOUS

## 2021-03-10 ASSESSMENT — PAIN DESCRIPTION - DESCRIPTORS
DESCRIPTORS: ACHING
DESCRIPTORS: ACHING

## 2021-03-10 ASSESSMENT — PAIN DESCRIPTION - LOCATION
LOCATION: BACK;HIP
LOCATION: BACK;HIP

## 2021-03-10 ASSESSMENT — PAIN DESCRIPTION - PAIN TYPE: TYPE: ACUTE PAIN

## 2021-03-10 ASSESSMENT — PAIN SCALES - GENERAL
PAINLEVEL_OUTOF10: 5
PAINLEVEL_OUTOF10: 10

## 2021-03-10 NOTE — ED NOTES
The patient went to x ray and back with the tech.                       Ginny Mitchell RN  03/10/21 [id number]

## 2021-03-10 NOTE — ED PROVIDER NOTES
[address] [facility] ENCOUNTER      Pt Name: Eddie River  MRN: [id number]  [place] 1945  Date of evaluation: 3/10/2021  Provider: Priyank Camarillo MD    [address]       Chief Complaint   Patient presents with    Hip Pain     presents with right lower back and right hip pain    Back Pain         HISTORY OF PRESENT ILLNESS      Eddie River is a 77 y.o. female who presents to the emergency department  for   Chief Complaint   Patient presents with    Hip Pain     presents with right lower back and right hip pain    Back Pain       77 yof with h/o waldenstrom's macroglobulinemia presents with back pain exacerbation. She has had issues with back pain and has been seeing a chiropractor and undergoing treatments. She denies any remarkable history of trauma, surgery or injury to her back. She presents with family who provide collateral information. She reports that she went to sit down today and that her back \"seized. \"  She is not having any bowel or urinary issues. No saddle paresthesias. She does have some chronic \"numbness\" on the lateral right leg. She denies any change in the numbness. She had difficulty ambulating so is brought to the emergency department for evaluation. She reports that she typically takes Tylenol but nothing else for pain at home. She does not endorse trying any measures at home for the pain. She is moving all extremities spontaneously in the emergency department. She is alert and oriented with a GCS of 15 upon presentation. She is at have any respiratory symptoms. No fever, chills or constitutional infectious symptoms. Nursing Notes, Triage Notes & Vital Signs were reviewed. REVIEW OF SYSTEMS    (2-9 systems for level 4, 10 or more for level 5)     Review of Systems   Constitutional: Negative for chills and fever. HENT: Negative for congestion, rhinorrhea and sore throat.     Eyes: Negative for pain and discharge. Respiratory: Negative for cough and shortness of breath. Cardiovascular: Negative for chest pain and palpitations. Gastrointestinal: Negative for abdominal pain, nausea and vomiting. Endocrine: Negative for polydipsia and polyuria. Genitourinary: Negative for dysuria and flank pain. Musculoskeletal: Positive for back pain. Negative for neck pain. Skin: Negative for pallor and wound. Neurological: Negative for dizziness, facial asymmetry, light-headedness, numbness and headaches. Psychiatric/Behavioral: Negative for confusion. Except as noted above the remainder of the review of systems was reviewed and negative. 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 Authorizing Provider   valsartan (DIOVAN) 160 MG tablet Take 160 mg by mouth daily   Yes Historical Provider, MD   HYDROcodone-acetaminophen (NORCO) 5-325 MG per tablet Take 1 tablet by mouth every 6 hours as needed for Pain for up to 3 days. Intended supply: 3 days.  Take lowest dose possible to manage pain 3/10/21 3/13/21 Yes Darian Snow MD   methocarbamol (ROBAXIN) 500 MG tablet Take 1 tablet by mouth 4 times daily as needed (pain and/or spasms) 3/10/21 3/20/21 Yes Darian Snow MD   lidocaine (LIDODERM) 5 % Place 1 patch onto the skin daily for 10 days 12 hours on, 12 hours off. 3/10/21 3/20/21 Yes Darian Snow MD   simvastatin (ZOCOR) 10 MG tablet Take 10 mg by mouth nightly   Yes Historical Provider, MD   Insulin Pen Needle (PEN NEEDLES) 31G X 8 MM MISC 1 pen by Does not apply route 2 times daily 4/4/19  Yes Carin Mcgill MD   acetaminophen (TYLENOL) 500 MG tablet Take 500 mg by mouth every 6 hours as needed for Pain   Yes Historical Provider, MD   insulin aspart (NOVOLOG FLEXPEN) 100 UNIT/ML injection pen Inject 20 Units into the skin 3 times daily (before meals) 5/29/18  Yes Carin Mcgill MD   Insulin Degludec (TRESIBA FLEXTOUCH) 200 UNIT/ML SOPN Inject 20 Units into the skin three times daily  Patient taking differently: Inject 40 Units into the skin daily  5/29/18  Yes Carin Mcgill MD   gabapentin (NEURONTIN) 300 MG capsule Take 1 capsule by mouth 3 times daily for 30 days. .  Patient taking differently: Take 400 mg by mouth 3 times daily. 5/29/18 3/10/21 Yes Carin Mcgill MD   alendronate (FOSAMAX) 70 MG tablet Take 1 tablet by mouth every 7 days 5/29/18  Yes Carin Mcgill MD   rOPINIRole (REQUIP) 2 MG tablet Take 1 tablet by mouth 2 times daily 5/29/18  Yes Carin Mcgill MD   pimecrolimus (ELIDEL) 1 % cream Apply topically 2 times daily Apply topically 2 times daily.  11/30/17  Yes Carin Mcgill MD   Lancets MISC Use 1 lancet 3 times daily 5/26/17  Yes Carin Mcgill MD   calcium carbonate (OSCAL) 500 MG TABS tablet Take 1 tablet by mouth daily 5/12/16  Yes Juan Carlos Segura PA-C   pantoprazole (PROTONIX) 40 MG tablet TAKE 1 TABLET DAILY 9/28/18   Carin Mcgill MD   Glucose Blood (BLOOD GLUCOSE TEST STRIPS) STRP Check BS 4x a day 6/5/18   Carin Mcgill MD   ONE TOUCH ULTRA TEST strip TO CHECK BLOOD SUGAR 4X DAILY WITH CURRENT GLUCOMETER: DX: DIABETES TYPE II E11.9 6/4/18   Carin Mcgill MD   Multiple Vitamins-Minerals (THERAPEUTIC MULTIVITAMIN-MINERALS) tablet Take 1 tablet by mouth daily    Historical Provider, MD   fluticasone (VERAMYST) 27.5 MCG/SPRAY nasal spray 2 sprays by Nasal route daily    Historical Provider, MD   guaiFENesin (MUCINEX) 600 MG extended release tablet Take 2 tablets by mouth 2 times daily 5/29/18   Carin Mcgill MD   traZODone (DESYREL) 50 MG tablet Take 1 tablet by mouth nightly 5/29/18   Usha Martins MD   losartan-hydrochlorothiazide (HYZAAR) 100-12.5 MG per tablet Take 1 tablet by mouth daily 5/29/18   Usha Martins MD   Glucose Blood (BLOOD GLUCOSE TEST STRIPS) STRP To check blood sugar 4x daily with current Glucometer:   DX: diabetes type .00 5/29/18   Usha Martins MD   pantoprazole (PROTONIX) 40 MG tablet Take 1 tablet by mouth daily 2/9/18   Usha Martins MD   Blood Glucose Monitoring Suppl DOMINIQUE One device 5/26/17   Usha Martins MD   cycloSPORINE (RESTASIS) 0.05 % ophthalmic emulsion 1 drop 2 times daily    Historical Provider, MD        Patient Active Problem List   Diagnosis    Restless leg syndrome    Hypertension    Diabetes mellitus with neuropathy ([facility]    Malignant lymphoplasmacytic lymphoma ([facility]    Osteopenia    Gastroesophageal reflux disease without esophagitis    Insomnia    Osteoporosis         SURGICAL HISTORY       Past Surgical History:   Procedure Laterality Date    HAND SURGERY  12/2016    HYSTERECTOMY      TUBAL LIGATION           CURRENT MEDICATIONS       Previous Medications    ACETAMINOPHEN (TYLENOL) 500 MG TABLET    Take 500 mg by mouth every 6 hours as needed for Pain    ALENDRONATE (FOSAMAX) 70 MG TABLET    Take 1 tablet by mouth every 7 days    BLOOD GLUCOSE MONITORING SUPPL DOMINIQUE    One device    CALCIUM CARBONATE (OSCAL) 500 MG TABS TABLET    Take 1 tablet by mouth daily    CYCLOSPORINE (RESTASIS) 0.05 % OPHTHALMIC EMULSION    1 drop 2 times daily    FLUTICASONE (VERAMYST) 27.5 MCG/SPRAY NASAL SPRAY    2 sprays by Nasal route daily    GABAPENTIN (NEURONTIN) 300 MG CAPSULE    Take 1 capsule by mouth 3 times daily for 30 days. Ulus Reusing     GLUCOSE BLOOD (BLOOD GLUCOSE TEST STRIPS) STRP    To check blood sugar 4x daily with current Glucometer:   DX: diabetes type .00    GLUCOSE BLOOD (BLOOD GLUCOSE TEST STRIPS) STRP    Check BS 4x a day    GUAIFENESIN (MUCINEX) 600 MG EXTENDED RELEASE TABLET    Take 2 tablets by mouth 2 times daily    INSULIN ASPART (NOVOLOG FLEXPEN) 100 UNIT/ML INJECTION PEN    Inject 20 Units into the skin 3 times daily (before meals)    INSULIN DEGLUDEC (TRESIBA FLEXTOUCH) 200 UNIT/ML SOPN    Inject 20 Units into the skin three times daily    INSULIN PEN NEEDLE (PEN NEEDLES) 31G X 8 MM MISC    1 pen by Does not apply route 2 times daily    LANCETS MISC    Use 1 lancet 3 times daily    LOSARTAN-HYDROCHLOROTHIAZIDE (HYZAAR) 100-12.5 MG PER TABLET    Take 1 tablet by mouth daily    MULTIPLE VITAMINS-MINERALS (THERAPEUTIC MULTIVITAMIN-MINERALS) TABLET    Take 1 tablet by mouth daily    ONE TOUCH ULTRA TEST STRIP    TO CHECK BLOOD SUGAR 4X DAILY WITH CURRENT GLUCOMETER: DX: DIABETES TYPE II E11.9    PANTOPRAZOLE (PROTONIX) 40 MG TABLET    Take 1 tablet by mouth daily    PANTOPRAZOLE (PROTONIX) 40 MG TABLET    TAKE 1 TABLET DAILY    PIMECROLIMUS (ELIDEL) 1 % CREAM    Apply topically 2 times daily Apply topically 2 times daily.     ROPINIROLE (REQUIP) 2 MG TABLET    Take 1 tablet by mouth 2 times daily    SIMVASTATIN (ZOCOR) 10 MG TABLET    Take 10 mg by mouth nightly    TRAZODONE (DESYREL) 50 MG TABLET    Take 1 tablet by mouth nightly    VALSARTAN (DIOVAN) 160 MG TABLET    Take 160 mg by mouth daily       ALLERGIES     Latex and Triamterene    FAMILY HISTORY       Family History   Problem Relation Age of Onset    Cancer Mother 46        breast cancer,  @ age 47    Asthma Father     Cancer Father [de-identified]        colon cancer    High Blood Pressure Father     High Blood Pressure Sister     Vision Loss Maternal Grandmother         glaucoma    Arthritis Sister     Diabetes Sister     Cancer Sister 61        breast, then mastitized to liver,  from complications    High Blood Pressure Sister     Diabetes Sister     High Blood Pressure Sister           SOCIAL HISTORY       Social History     Socioeconomic History    Marital status:      Spouse name: None    Number of children: None  Years of education: None    Highest education level: None   Occupational History    None   Social Needs    Financial resource strain: None    Food insecurity     Worry: None     Inability: None    Transportation needs     Medical: None     Non-medical: None   Tobacco Use    Smoking status: Former Smoker     Packs/day: 1.00     Years: 31.00     Pack years: 31.00     Types: Cigarettes     Start date: 1965     Quit date: 1996     Years since quittin.0    Smokeless tobacco: Never Used   Substance and Sexual Activity    Alcohol use: No     Alcohol/week: 0.0 standard drinks    Drug use: No    Sexual activity: None   Lifestyle    Physical activity     Days per week: None     Minutes per session: None    Stress: None   Relationships    Social connections     Talks on phone: None     Gets together: None     Attends [employer] service: None     Active member of club or organization: None     Attends meetings of clubs or organizations: None     Relationship status: None    Intimate partner violence     Fear of current or ex partner: None     Emotionally abused: None     Physically abused: None     Forced sexual activity: None   Other Topics Concern    None   Social History Narrative    None       SCREENINGS               PHYSICAL EXAM    (up to 7 for level 4, 8 or more for level 5)     ED Triage Vitals [03/10/21 1007]   BP Temp Temp Source Pulse Resp SpO2 Height Weight   (!) 169/89 96.9 °F (36.1 °C) Infrared 71 20 98 % 5' 1\" (1.549 m) 150 lb (68 kg)       Physical Exam  Vitals signs reviewed. Constitutional:       Appearance: She is not ill-appearing or toxic-appearing. HENT:      Head: Normocephalic and atraumatic. Nose: No congestion or rhinorrhea. Mouth/Throat:      Mouth: Mucous membranes are moist.      Pharynx: No oropharyngeal exudate or posterior oropharyngeal erythema. Eyes:      General:         Right eye: No discharge. Left eye: No discharge.       Extraocular Movements: Extraocular movements intact. Pupils: Pupils are equal, round, and reactive to light. Neck:      Musculoskeletal: Normal range of motion and neck supple. No muscular tenderness. Cardiovascular:      Rate and Rhythm: Normal rate. Heart sounds: No friction rub. No gallop. Pulmonary:      Effort: Pulmonary effort is normal. No respiratory distress. Comments: Respirations unlabored  No retractions, no increased work of breathing  Chest:      Chest wall: No tenderness. Abdominal:      Palpations: Abdomen is soft. Tenderness: There is no abdominal tenderness. There is no guarding. Comments: Abdomen soft, non-tender, non-peritoneal  No guarding on abdominal exam   Musculoskeletal:         General: Tenderness present. Right lower leg: No edema. Left lower leg: No edema. Comments: Pelvis stable  Reproducible paraspinal low back tenderness and right hip tenderness  No deformities or step-offs noted   Lymphadenopathy:      Cervical: No cervical adenopathy. Skin:     General: Skin is warm. Capillary Refill: Capillary refill takes less than 2 seconds. Findings: No erythema or rash. Neurological:      General: No focal deficit present. Mental Status: She is alert and oriented to person, place, and time. Mental status is at baseline. DIAGNOSTIC RESULTS     Labs Reviewed - No data to display         RADIOLOGY:     Non-plain film images such as CT, Ultrasound and MRI are read by the radiologist. Plain radiographic images are visualized and preliminarily interpreted by the emergency physician. Interpretation per the Radiologist below, if available at the time of this note:    XR LUMBAR SPINE (2-3 VIEWS)   Final Result   Mild-to-moderate degenerative disc disease. No acute osseous abnormality of   the lumbar spine. XR HIP 2-3 VW W PELVIS RIGHT   Final Result   Osteopenia. No acute osseous injury of the pelvis or right hip. ED BEDSIDE ULTRASOUND:   Performed by ED Physician Ngozi Petty MD       LABS:  Labs Reviewed - No data to display    All other labs were within normal range or not returned as of this dictation. EMERGENCY DEPARTMENT COURSE and DIFFERENTIAL DIAGNOSIS/MDM:   Vitals:    Vitals:    03/10/21 1007   BP: (!) 169/89   Pulse: 71   Resp: 20   Temp: 96.9 °F (36.1 °C)   TempSrc: Infrared   SpO2: 98%   Weight: 150 lb (68 kg)   Height: 5' 1\" (1.549 m)           MDM  Number of Diagnoses or Management Options  Back pain, unspecified back location, unspecified back pain laterality, unspecified chronicity  Degenerative disc disease, lumbar  Diagnosis management comments: 50-year-old female with history of chronic back pain for which he sees a chiropractor presents with back pain exacerbation. She does have history of Waldenström's macroglobulinemia. She reports that she went to sit down today and her back \"seized. \"  Denies any saddle paresthesias. No bowel or urinary incontinence. She does have a history of some chronic right lateral leg paresthesias. She reports is her baseline. She is a difficulty ambulating. She presents with elevated blood pressure. Vitals otherwise unremarkable. She does have reproducible paraspinal tenderness palpation. X-rays were obtained. She is treated symptomatically in the emergency department. X-rays do show some degenerative changes in the back. No acute traumatic abnormality or other abnormality noted. Results are discussed with patient and family. She will follow-up outpatient with her primary care physician or chiropractor. She will prescribe symptomatic measures for home. She is agreeable plan of care. She is able to ambulate. She is discharged home in stable condition. Amount and/or Complexity of Data Reviewed  Tests in the radiology section of CPT®: reviewed            -  Patient seen and evaluated in the emergency department.   -  Triage and nursing notes reviewed and incorporated. -  Old chart records reviewed and incorporated. -  Work-up included:  See above  -  Results discussed with patient. CONSULTS:  None    PROCEDURES:  None performed unless otherwise noted below     Procedures        FINAL IMPRESSION      1. Back pain, unspecified back location, unspecified back pain laterality, unspecified chronicity    2. Degenerative disc disease, lumbar          DISPOSITION/PLAN   DISPOSITION        PATIENT REFERRED TO:  Patience Hunter MD  [address]  [phone]    Schedule an appointment as soon as possible for a visit         DISCHARGE MEDICATIONS:  New Prescriptions    HYDROCODONE-ACETAMINOPHEN (NORCO) 5-325 MG PER TABLET    Take 1 tablet by mouth every 6 hours as needed for Pain for up to 3 days. Intended supply: 3 days. Take lowest dose possible to manage pain    LIDOCAINE (LIDODERM) 5 %    Place 1 patch onto the skin daily for 10 days 12 hours on, 12 hours off. METHOCARBAMOL (ROBAXIN) 500 MG TABLET    Take 1 tablet by mouth 4 times daily as needed (pain and/or spasms)       ED Provider Disposition Time  DISPOSITION        Appropriate personal protective equipment was worn during the patient's evaluation. These included surgical, eye protection, surgical mask or in 95 respirator and gloves. The patient was also placed in a surgical mask for the prevention of possible spread of respiratory viral illnesses. The Patient was instructed to read the package inserts with any medication that was prescribed. Major potential reactions and medication interactions were discussed. The Patient understands that there are numerous possible adverse reactions not covered. The patient was also instructed to arrange follow-up with his or her primary care provider for review of any pending labwork or incidental findings on any radiology results that were obtained.   All efforts were made to discuss any incidental findings that require further monitoring. Controlled Substances Monitoring:     No flowsheet data found.     (Please note that portions of this note were completed with a voice recognition program.  Efforts were made to edit the dictations but occasionally words are mis-transcribed.)    Brianda Fernandez MD (electronically signed)  Attending Emergency Physician           Brianda Fernandez MD  03/10/21 3394

## 2021-03-10 NOTE — ED NOTES
Discharge instructions and prescriptions were reviewed and the patient will follow up with the PCP. She was able to walk with assistance to the car from her room. She was rating the pain at 5/10 at the time of discharge.                      Sim Bonner RN  03/10/21 3170

## 2021-03-12 ENCOUNTER — APPOINTMENT (OUTPATIENT)
Dept: MRI IMAGING | Age: 76
DRG: 454 | End: 2021-03-12
Payer: MEDICARE

## 2021-03-12 ENCOUNTER — HOSPITAL ENCOUNTER (INPATIENT)
Age: 76
LOS: 6 days | Discharge: HOME | DRG: 454 | End: 2021-03-18
Attending: HOSPITALIST | Admitting: HOSPITALIST
Payer: MEDICARE

## 2021-03-12 ENCOUNTER — APPOINTMENT (OUTPATIENT)
Dept: CT IMAGING | Age: 76
DRG: 454 | End: 2021-03-12
Payer: MEDICARE

## 2021-03-12 ENCOUNTER — APPOINTMENT (OUTPATIENT)
Dept: GENERAL RADIOLOGY | Age: 76
DRG: 454 | End: 2021-03-12
Payer: MEDICARE

## 2021-03-12 DIAGNOSIS — Z98.1 STATUS POST LUMBAR SPINAL FUSION: ICD-10-CM

## 2021-03-12 DIAGNOSIS — M51.26 HERNIATED NUCLEUS PULPOSUS, L3-4 RIGHT: Primary | ICD-10-CM

## 2021-03-12 PROBLEM — E87.1 HYPONATREMIA: Status: ACTIVE | Noted: 2021-03-12

## 2021-03-12 PROBLEM — M54.9 INTRACTABLE BACK PAIN: Status: ACTIVE | Noted: 2021-03-12

## 2021-03-12 LAB
ALBUMIN SERPL-MCNC: 4.3 GM/DL (ref 3.4–5)
ALP BLD-CCNC: 40 IU/L (ref 40–129)
ALT SERPL-CCNC: 19 U/L (ref 10–40)
ANION GAP SERPL CALCULATED.3IONS-SCNC: 7 MMOL/L (ref 4–16)
AST SERPL-CCNC: 25 IU/L (ref 15–37)
BASOPHILS ABSOLUTE: 0 K/CU MM
BASOPHILS RELATIVE PERCENT: 0.3 % (ref 0–1)
BILIRUB SERPL-MCNC: 0.7 MG/DL (ref 0–1)
BUN BLDV-MCNC: 21 MG/DL (ref 6–23)
CALCIUM SERPL-MCNC: 9.6 MG/DL (ref 8.3–10.6)
CHLORIDE BLD-SCNC: 98 MMOL/L (ref 99–110)
CO2: 28 MMOL/L (ref 21–32)
CREAT SERPL-MCNC: 0.9 MG/DL (ref 0.6–1.1)
DIFFERENTIAL TYPE: ABNORMAL
EOSINOPHILS ABSOLUTE: 0.1 K/CU MM
EOSINOPHILS RELATIVE PERCENT: 1 % (ref 0–3)
ESTIMATED AVERAGE GLUCOSE: 154 MG/DL
GFR AFRICAN AMERICAN: >60 ML/MIN/1.73M2
GFR NON-AFRICAN AMERICAN: >60 ML/MIN/1.73M2
GLUCOSE BLD-MCNC: 103 MG/DL (ref 70–99)
GLUCOSE BLD-MCNC: 124 MG/DL (ref 70–99)
GLUCOSE BLD-MCNC: 70 MG/DL (ref 70–99)
HBA1C MFR BLD: 7 % (ref 4.2–6.3)
HCT VFR BLD CALC: 43.1 % (ref 37–47)
HEMOGLOBIN: 14.9 GM/DL (ref 12.5–16)
IMMATURE NEUTROPHIL %: 0.3 % (ref 0–0.43)
LYMPHOCYTES ABSOLUTE: 1.1 K/CU MM
LYMPHOCYTES RELATIVE PERCENT: 15.6 % (ref 24–44)
MCH RBC QN AUTO: 34 PG (ref 27–31)
MCHC RBC AUTO-ENTMCNC: 34.6 % (ref 32–36)
MCV RBC AUTO: 98.4 FL (ref 78–100)
MONOCYTES ABSOLUTE: 0.4 K/CU MM
MONOCYTES RELATIVE PERCENT: 6.2 % (ref 0–4)
NUCLEATED RBC %: 0 %
PDW BLD-RTO: 13.1 % (ref 11.7–14.9)
PLATELET # BLD: 166 K/CU MM (ref 140–440)
PMV BLD AUTO: 9.1 FL (ref 7.5–11.1)
POTASSIUM SERPL-SCNC: 3.9 MMOL/L (ref 3.5–5.1)
RBC # BLD: 4.38 M/CU MM (ref 4.2–5.4)
SEGMENTED NEUTROPHILS ABSOLUTE COUNT: 5.2 K/CU MM
SEGMENTED NEUTROPHILS RELATIVE PERCENT: 76.6 % (ref 36–66)
SODIUM BLD-SCNC: 133 MMOL/L (ref 135–145)
TOTAL IMMATURE NEUTOROPHIL: 0.02 K/CU MM
TOTAL NUCLEATED RBC: 0 K/CU MM
TOTAL PROTEIN: 6.2 GM/DL (ref 6.4–8.2)
WBC # BLD: 6.8 K/CU MM (ref 4–10.5)

## 2021-03-12 PROCEDURE — 96375 TX/PRO/DX INJ NEW DRUG ADDON: CPT

## 2021-03-12 PROCEDURE — 96374 THER/PROPH/DIAG INJ IV PUSH: CPT

## 2021-03-12 PROCEDURE — 80053 COMPREHEN METABOLIC PANEL: CPT

## 2021-03-12 PROCEDURE — 83036 HEMOGLOBIN GLYCOSYLATED A1C: CPT

## 2021-03-12 PROCEDURE — 1200000000 HC SEMI PRIVATE

## 2021-03-12 PROCEDURE — 99285 EMERGENCY DEPT VISIT HI MDM: CPT

## 2021-03-12 PROCEDURE — 6370000000 HC RX 637 (ALT 250 FOR IP): Performed by: NURSE PRACTITIONER

## 2021-03-12 PROCEDURE — 72131 CT LUMBAR SPINE W/O DYE: CPT

## 2021-03-12 PROCEDURE — 72148 MRI LUMBAR SPINE W/O DYE: CPT

## 2021-03-12 PROCEDURE — 82962 GLUCOSE BLOOD TEST: CPT

## 2021-03-12 PROCEDURE — 2580000003 HC RX 258: Performed by: NURSE PRACTITIONER

## 2021-03-12 PROCEDURE — 96372 THER/PROPH/DIAG INJ SC/IM: CPT

## 2021-03-12 PROCEDURE — 85025 COMPLETE CBC W/AUTO DIFF WBC: CPT

## 2021-03-12 PROCEDURE — 73502 X-RAY EXAM HIP UNI 2-3 VIEWS: CPT

## 2021-03-12 PROCEDURE — 6360000002 HC RX W HCPCS: Performed by: PHYSICIAN ASSISTANT

## 2021-03-12 RX ORDER — CYCLOSPORINE 0.5 MG/ML
1 EMULSION OPHTHALMIC 2 TIMES DAILY
Status: DISCONTINUED | OUTPATIENT
Start: 2021-03-12 | End: 2021-03-12 | Stop reason: CLARIF

## 2021-03-12 RX ORDER — SODIUM CHLORIDE 0.9 % (FLUSH) 0.9 %
10 SYRINGE (ML) INJECTION PRN
Status: DISCONTINUED | OUTPATIENT
Start: 2021-03-12 | End: 2021-03-18 | Stop reason: HOSPADM

## 2021-03-12 RX ORDER — VALSARTAN AND HYDROCHLOROTHIAZIDE 160; 12.5 MG/1; MG/1
1 TABLET, FILM COATED ORAL DAILY
COMMUNITY

## 2021-03-12 RX ORDER — ONDANSETRON 2 MG/ML
4 INJECTION INTRAMUSCULAR; INTRAVENOUS EVERY 6 HOURS PRN
Status: DISCONTINUED | OUTPATIENT
Start: 2021-03-12 | End: 2021-03-18 | Stop reason: HOSPADM

## 2021-03-12 RX ORDER — PROMETHAZINE HYDROCHLORIDE 12.5 MG/1
12.5 TABLET ORAL EVERY 6 HOURS PRN
Status: DISCONTINUED | OUTPATIENT
Start: 2021-03-12 | End: 2021-03-18 | Stop reason: HOSPADM

## 2021-03-12 RX ORDER — DEXTROSE MONOHYDRATE 25 G/50ML
12.5 INJECTION, SOLUTION INTRAVENOUS PRN
Status: DISCONTINUED | OUTPATIENT
Start: 2021-03-12 | End: 2021-03-18 | Stop reason: HOSPADM

## 2021-03-12 RX ORDER — OMEPRAZOLE 20 MG/1
20 CAPSULE, DELAYED RELEASE ORAL DAILY
COMMUNITY

## 2021-03-12 RX ORDER — M-VIT,TX,IRON,MINS/CALC/FOLIC 27MG-0.4MG
1 TABLET ORAL DAILY
Status: DISCONTINUED | OUTPATIENT
Start: 2021-03-12 | End: 2021-03-18 | Stop reason: HOSPADM

## 2021-03-12 RX ORDER — ONDANSETRON 2 MG/ML
4 INJECTION INTRAMUSCULAR; INTRAVENOUS EVERY 30 MIN PRN
Status: DISCONTINUED | OUTPATIENT
Start: 2021-03-12 | End: 2021-03-12 | Stop reason: HOSPADM

## 2021-03-12 RX ORDER — CALCIUM CARBONATE 500(1250)
500 TABLET ORAL
Status: DISCONTINUED | OUTPATIENT
Start: 2021-03-13 | End: 2021-03-18 | Stop reason: HOSPADM

## 2021-03-12 RX ORDER — LIDOCAINE 50 MG/G
1 PATCH TOPICAL DAILY
Status: DISCONTINUED | OUTPATIENT
Start: 2021-03-13 | End: 2021-03-12

## 2021-03-12 RX ORDER — LIDOCAINE 4 G/G
1 PATCH TOPICAL DAILY
Status: DISCONTINUED | OUTPATIENT
Start: 2021-03-13 | End: 2021-03-18 | Stop reason: HOSPADM

## 2021-03-12 RX ORDER — HYDROCODONE BITARTRATE AND ACETAMINOPHEN 5; 325 MG/1; MG/1
1 TABLET ORAL EVERY 6 HOURS PRN
Status: DISCONTINUED | OUTPATIENT
Start: 2021-03-12 | End: 2021-03-16

## 2021-03-12 RX ORDER — INSULIN GLARGINE 100 [IU]/ML
40 INJECTION, SOLUTION SUBCUTANEOUS DAILY
Status: DISCONTINUED | OUTPATIENT
Start: 2021-03-13 | End: 2021-03-18 | Stop reason: HOSPADM

## 2021-03-12 RX ORDER — GABAPENTIN 400 MG/1
400 CAPSULE ORAL 3 TIMES DAILY
Status: DISCONTINUED | OUTPATIENT
Start: 2021-03-12 | End: 2021-03-18 | Stop reason: HOSPADM

## 2021-03-12 RX ORDER — PIMECROLIMUS 10 MG/G
CREAM TOPICAL 2 TIMES DAILY
Status: DISCONTINUED | OUTPATIENT
Start: 2021-03-12 | End: 2021-03-18 | Stop reason: HOSPADM

## 2021-03-12 RX ORDER — SODIUM CHLORIDE 0.9 % (FLUSH) 0.9 %
10 SYRINGE (ML) INJECTION EVERY 12 HOURS SCHEDULED
Status: DISCONTINUED | OUTPATIENT
Start: 2021-03-12 | End: 2021-03-18 | Stop reason: HOSPADM

## 2021-03-12 RX ORDER — ACETAMINOPHEN 650 MG/1
650 SUPPOSITORY RECTAL EVERY 6 HOURS PRN
Status: DISCONTINUED | OUTPATIENT
Start: 2021-03-12 | End: 2021-03-18 | Stop reason: HOSPADM

## 2021-03-12 RX ORDER — ACETAMINOPHEN 500 MG
500 TABLET ORAL EVERY 6 HOURS PRN
Status: DISCONTINUED | OUTPATIENT
Start: 2021-03-12 | End: 2021-03-16

## 2021-03-12 RX ORDER — OXYCODONE HYDROCHLORIDE AND ACETAMINOPHEN 5; 325 MG/1; MG/1
1 TABLET ORAL EVERY 4 HOURS PRN
Status: CANCELLED | OUTPATIENT
Start: 2021-03-12

## 2021-03-12 RX ORDER — ROPINIROLE 1 MG/1
2 TABLET, FILM COATED ORAL 2 TIMES DAILY
Status: DISCONTINUED | OUTPATIENT
Start: 2021-03-12 | End: 2021-03-13

## 2021-03-12 RX ORDER — POLYVINYL ALCOHOL 14 MG/ML
1 SOLUTION/ DROPS OPHTHALMIC 2 TIMES DAILY
Status: DISCONTINUED | OUTPATIENT
Start: 2021-03-12 | End: 2021-03-18 | Stop reason: HOSPADM

## 2021-03-12 RX ORDER — TRAMADOL HYDROCHLORIDE 50 MG/1
25 TABLET ORAL EVERY 6 HOURS PRN
Status: DISCONTINUED | OUTPATIENT
Start: 2021-03-12 | End: 2021-03-18 | Stop reason: HOSPADM

## 2021-03-12 RX ORDER — CALCIUM CARBONATE 500(1250)
600 TABLET ORAL DAILY
Status: DISCONTINUED | OUTPATIENT
Start: 2021-03-12 | End: 2021-03-12 | Stop reason: CLARIF

## 2021-03-12 RX ORDER — PANTOPRAZOLE SODIUM 40 MG/1
40 TABLET, DELAYED RELEASE ORAL
Status: DISCONTINUED | OUTPATIENT
Start: 2021-03-13 | End: 2021-03-18 | Stop reason: HOSPADM

## 2021-03-12 RX ORDER — ATORVASTATIN CALCIUM 10 MG/1
5 TABLET, FILM COATED ORAL NIGHTLY
Status: DISCONTINUED | OUTPATIENT
Start: 2021-03-12 | End: 2021-03-18 | Stop reason: HOSPADM

## 2021-03-12 RX ORDER — NICOTINE POLACRILEX 4 MG
15 LOZENGE BUCCAL PRN
Status: DISCONTINUED | OUTPATIENT
Start: 2021-03-12 | End: 2021-03-18 | Stop reason: HOSPADM

## 2021-03-12 RX ORDER — METHOCARBAMOL 500 MG/1
500 TABLET, FILM COATED ORAL 4 TIMES DAILY PRN
Status: DISCONTINUED | OUTPATIENT
Start: 2021-03-12 | End: 2021-03-16

## 2021-03-12 RX ORDER — FLUTICASONE PROPIONATE 50 MCG
2 SPRAY, SUSPENSION (ML) NASAL DAILY
Status: DISCONTINUED | OUTPATIENT
Start: 2021-03-12 | End: 2021-03-15

## 2021-03-12 RX ORDER — ACETAMINOPHEN 325 MG/1
650 TABLET ORAL EVERY 6 HOURS PRN
Status: DISCONTINUED | OUTPATIENT
Start: 2021-03-12 | End: 2021-03-18 | Stop reason: HOSPADM

## 2021-03-12 RX ORDER — MORPHINE SULFATE 4 MG/ML
4 INJECTION, SOLUTION INTRAMUSCULAR; INTRAVENOUS EVERY 30 MIN PRN
Status: DISCONTINUED | OUTPATIENT
Start: 2021-03-12 | End: 2021-03-12 | Stop reason: HOSPADM

## 2021-03-12 RX ORDER — VALSARTAN AND HYDROCHLOROTHIAZIDE 160; 12.5 MG/1; MG/1
1 TABLET, FILM COATED ORAL DAILY
Status: DISCONTINUED | OUTPATIENT
Start: 2021-03-13 | End: 2021-03-18 | Stop reason: HOSPADM

## 2021-03-12 RX ORDER — ALENDRONATE SODIUM 70 MG/1
70 TABLET ORAL
Status: DISCONTINUED | OUTPATIENT
Start: 2021-03-12 | End: 2021-03-12 | Stop reason: CLARIF

## 2021-03-12 RX ORDER — POLYETHYLENE GLYCOL 3350 17 G/17G
17 POWDER, FOR SOLUTION ORAL DAILY PRN
Status: DISCONTINUED | OUTPATIENT
Start: 2021-03-12 | End: 2021-03-13

## 2021-03-12 RX ORDER — ORPHENADRINE CITRATE 30 MG/ML
60 INJECTION INTRAMUSCULAR; INTRAVENOUS ONCE
Status: COMPLETED | OUTPATIENT
Start: 2021-03-12 | End: 2021-03-12

## 2021-03-12 RX ORDER — SODIUM CHLORIDE 9 MG/ML
INJECTION, SOLUTION INTRAVENOUS CONTINUOUS
Status: ACTIVE | OUTPATIENT
Start: 2021-03-12 | End: 2021-03-13

## 2021-03-12 RX ORDER — DEXTROSE MONOHYDRATE 50 MG/ML
100 INJECTION, SOLUTION INTRAVENOUS PRN
Status: DISCONTINUED | OUTPATIENT
Start: 2021-03-12 | End: 2021-03-18 | Stop reason: HOSPADM

## 2021-03-12 RX ADMIN — ATORVASTATIN CALCIUM 5 MG: 10 TABLET, FILM COATED ORAL at 21:09

## 2021-03-12 RX ADMIN — MORPHINE SULFATE 4 MG: 4 INJECTION, SOLUTION INTRAMUSCULAR; INTRAVENOUS at 12:28

## 2021-03-12 RX ADMIN — METHOCARBAMOL TABLETS 500 MG: 500 TABLET, COATED ORAL at 19:56

## 2021-03-12 RX ADMIN — GABAPENTIN 400 MG: 400 CAPSULE ORAL at 18:23

## 2021-03-12 RX ADMIN — SODIUM CHLORIDE, PRESERVATIVE FREE 10 ML: 5 INJECTION INTRAVENOUS at 21:09

## 2021-03-12 RX ADMIN — SODIUM CHLORIDE: 9 INJECTION, SOLUTION INTRAVENOUS at 18:48

## 2021-03-12 RX ADMIN — TRAMADOL HYDROCHLORIDE 25 MG: 50 TABLET, FILM COATED ORAL at 19:52

## 2021-03-12 RX ADMIN — ROPINIROLE HYDROCHLORIDE 2 MG: 1 TABLET, FILM COATED ORAL at 21:09

## 2021-03-12 RX ADMIN — ORPHENADRINE CITRATE 60 MG: 60 INJECTION INTRAMUSCULAR; INTRAVENOUS at 14:30

## 2021-03-12 RX ADMIN — ONDANSETRON 4 MG: 2 INJECTION INTRAMUSCULAR; INTRAVENOUS at 12:28

## 2021-03-12 RX ADMIN — MULTIPLE VITAMINS W/ MINERALS TAB 1 TABLET: TAB at 18:23

## 2021-03-12 RX ADMIN — GABAPENTIN 400 MG: 400 CAPSULE ORAL at 21:09

## 2021-03-12 NOTE — ED PROVIDER NOTES
As physician-in-triage, I performed a telehealth medical screening history and exam on this patient. HISTORY OF PRESENT ILLNESS  Anthony Esparza is a 77 y.o. female presents with right-sided lower back and hip pain that feels like a muscle spasm that radiates into her groin and into her right leg. She states she cannot sit on the toilet because it is painful to sit. She denies any fevers. No known injuries. The pain is been going on all week. She was seen in the evening emergency department on Wednesday and had x-rays of her lower back and right hip that showed nothing acute. Airam Mcintosh PHYSICAL EXAM  BP (!) 148/67   Pulse 80   Temp 99.1 °F (37.3 °C) (Oral)   Resp 20   SpO2 99%     On exam, the patient appears in no acute distress. Speech is clear. Breathing is unlabored. Moves all extremities    Comment: Please note this report has been produced using speech recognition software and may contain errors related to that system including errors in grammar, punctuation, and spelling, as well as words and phrases that may be inappropriate. If there are any questions or concerns please feel free to contact the dictating provider for clarification.         Lyric Segovia MD  03/12/21 2401

## 2021-03-12 NOTE — ED NOTES
Richard LOVE ok to give norflex at this time and per pt request       Hamilton Lynn RN  03/12/21 9199

## 2021-03-12 NOTE — H&P
History and Physical  RORO Blackwood-BC   Internal Medicine Hospitalist      Name:  Lamont Meyers /Age/Sex: 1945  (77 y.o. female)   MRN & CSN:  [id number] & [id number] Admission Date/Time: 3/12/2021 10:59 AM   Location:  [facility] PCP: Wilkie Osler, MD       Hospital Day: 1      Supervising Physician: Dr. Vani Fan      Chief Complaint: Back Pain     Assessment and Plan:   Lamont Meyers is a 77 y.o. female who presents with Intractable back pain     Intractable back pain, possibly 2/2 Mild-to-moderate degenerative disc disease       -CT lumbar shows Disc bulge at L3-4 contributes to moderate to severe canal narrowing.          and L1-2  and L2-3 disc bulge contributes to mild canal narrowing       -known h/o RLS, osteopenia, osteoporosis       -admit inpatient, telemetry monitoring         -consult Neurosurgery, Dr. Felipe Alonso       -control pain: Requip, lidocaine patch, prn Tylenol, Norco, Robaxin, Ultram       -PT/OT eval and treat       -case management consulted for possible home PT/OT       -pending MRI     Hyponatremia (131)       -IVF, 1L NS       -recheck BMP     DM type 2 - Last A1C 6.6       -Continue home dose degludec + sliding scale       -monitor accucheck       -diabetic diet       -pending HgbA1C      Chronic Illnesses:        -diabetic neuropathy -on gabapentin       -GERD -on PPI       -hypertension -on valsartan-hydrochlorothiazide       -Hyperlipidemia -on Zocor     Current diagnosis and plan of management discussed with the patient and daughter at the time of admission in lay language who agree to the above plan and disposition of admission for further care. All concerns and questions addressed.       Patient assessment and plan in conjunction with supervising physician - Dr. Eboni Pelayo control    DVT Prophylaxis [x] Lovenox, []  Heparin, [] SCDs, [] Ambulation  [] Long term AC   GI Prophylaxis [x] PPI,  [] H2 Blocker,  [] Carafate,  [] Diet,  [] No GI prophylaxis, N/A: patient is not under significant medical stress, non-ICU or is receiving a diet/tube feeds   Code Status  Full CODE   Disposition Patient requires continued admission due to Intractable back pain. Discharge Plan: Patient plans to return home upon discharge after seen by case management team.    MDM [] Low, [x] Moderate,[]  High  Patient's risk as above due to:      [x] One or more chronic illnesses with mild exacerbation progression      [] Two or more stable chronic illnesses      [] Undiagnosed new problem with uncertain prognosis      [] Elective major surgery      []Prescription drug management     History of Present Illness:     Principal Problem: Intractable back pain  Jill Pinzon is a 77 y.o. female with past medical history of DM type 2, diabetic neuropathy, RLS, osteopenia, osteoporosis, GERD, hyperlipidemia, and hypertension presented to the ED with daughter for complaints of worsening back pain. Patient reports that she had been experiencing some decreased sensation in her left anterior thigh, shin, buttock, and right hand. She went to chiropractor for treatment, they laid her on a table, and used something called a \"jackhammer\" for 20 minutes on her back, then after the treatment she had significant pain, rates 10/10 in her right buttock radiating to her entire back for almost a week that gradually got worse today. Stated that she is presently taking Norco but lately is not working. She denies falls and any injury. Patient denies chest pain, palpitation, fever, chills or diaphoresis, cough, SOB or difficulty breathing. Denies any other symptoms including headache, abdominal pain, nausea, vomiting, changes in bowels, dysuria, hematuria, frequency or urgency, and B/L weakness. Full code status confirmed by patient, daughter at bedside. Upon interview, the patient provided the history as above. ED Course: Discussed case with ED physician prior to admission.     ROS: Ten point ROS reviewed and negative, unless as noted above per HPI. Objective:   No intake or output data in the 24 hours ending 03/12/21 1546     Vitals: Pulse 80, Resp 20, Temp/oral 99.1  Vitals:    03/12/21 1331 03/12/21 1335 03/12/21 1401 03/12/21 1403   BP: 120/62  115/60    Pulse:       Resp:       Temp:       TempSrc:       SpO2: 96% 98% 94% 98%     Physical Exam: 03/12/21    GEN  -Awake, alert, appearing elderly female, lying in bed in no apparent distress, cooperative, able to give adequate history. Appears given age. EYES -Pupils are equally round. No vision changes. No scleral erythema, discharge, or conjunctivitis. HENT -MM are moist. Oral pharynx without exudates, no evidence of thrush. NECK -Supple, no apparent thyromegaly or masses. RESP -LS CTA equal bilat, no wheezes, rales or rhonchi. Symmetric chest movement. No respiratory distress noted. C/V  -S1/S2 auscultated. RRR without appreciable M/R/G. No JVD or carotid bruits. Peripheral pulses equal bilaterally and palpable. Peripheral pulses equal bilaterally and palpable. No peripheral edema. GI  -Abdomen is soft, round, non-distended, no significant tenderness. No masses or guarding. + BS in all quadrants. Rectal exam deferred.   -Mota catheter is not present. LYMPH  -No palpable cervical lymphadenopathy and no hepatosplenomegaly. No petechiae or ecchymoses. MS  -B/L UE strong muscles strength, B/L LE moderate muscles strength. Full movements. No gross joint deformities. No swelling, intact sensation symmetrical.   SKIN  -Normal coloration, warm, dry. No open wounds or ulcers. NEURO  -CN 2-12 appear grossly intact, normal speech, no lateralizing weakness. PSYC  -Awake, alert, oriented x 4. Appropriate affect.      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 Her smoking use included cigarettes. She started smoking about 55 years ago. She has a 31.00 pack-year smoking history. She has never used smokeless tobacco.  ETOH:   reports no history of alcohol use. Drugs:  reports no history of drug use. Allergies: Allergies   Allergen Reactions    Latex     Triamterene      Medications:   Medications:    insulin lispro  0-6 Units Subcutaneous TID WC    insulin lispro  0-3 Units Subcutaneous Nightly      Infusions:    dextrose       PRN Meds: morphine, 4 mg, Q30 Min PRN  ondansetron, 4 mg, Q30 Min PRN  glucose, 15 g, PRN  dextrose, 12.5 g, PRN  glucagon (rDNA), 1 mg, PRN  dextrose, 100 mL/hr, PRN      Prior to Admission Meds:  Prior to Admission medications    Medication Sig Start Date End Date Taking? Authorizing Provider   valsartan-hydroCHLOROthiazide (DIOVAN-HCT) 160-12.5 MG per tablet Take 1 tablet by mouth daily   Yes Historical Provider, MD   B Complex Vitamins (VITAMIN B COMPLEX PO) Take 1 tablet by mouth 2 times daily   Yes Historical Provider, MD   omeprazole (PRILOSEC) 20 MG delayed release capsule Take 20 mg by mouth daily OTC   Yes Historical Provider, MD   HYDROcodone-acetaminophen (NORCO) 5-325 MG per tablet Take 1 tablet by mouth every 6 hours as needed for Pain for up to 3 days. Intended supply: 3 days.  Take lowest dose possible to manage pain 3/10/21 3/13/21 Yes Eugene hCeema MD   methocarbamol (ROBAXIN) 500 MG tablet Take 1 tablet by mouth 4 times daily as needed (pain and/or spasms) 3/10/21 3/20/21 Yes Eugene Cheema MD   lidocaine (LIDODERM) 5 % Place 1 patch onto the skin daily for 10 days 12 hours on, 12 hours off. 3/10/21 3/20/21 Yes Eugene Cheema MD   simvastatin (ZOCOR) 5 MG tablet Take 5 mg by mouth nightly    Yes Historical Provider, MD   Multiple Vitamins-Minerals (THERAPEUTIC MULTIVITAMIN-MINERALS) tablet Take 1 tablet by mouth daily   Yes Historical Provider, MD   insulin aspart (NOVOLOG FLEXPEN) 100 UNIT/ML injection pen Inject 20 Units into the skin 3 times daily (before meals) 5/29/18  Yes Lizeth Bustamante MD   Insulin Degludec (TRESIBA FLEXTOUCH) 200 UNIT/ML SOPN Inject 20 Units into the skin three times daily  Patient taking differently: Inject 40 Units into the skin daily  5/29/18  Yes Lizeth Bustamante MD   gabapentin (NEURONTIN) 300 MG capsule Take 1 capsule by mouth 3 times daily for 30 days. .  Patient taking differently: Take 400 mg by mouth 3 times daily.   5/29/18 3/12/21 Yes Lizeth Bustamante MD   alendronate (FOSAMAX) 70 MG tablet Take 1 tablet by mouth every 7 days  Patient taking differently: Take 70 mg by mouth every 7 days Takes on Fridays 5/29/18  Yes Lizeth Bustamante MD   rOPINIRole (REQUIP) 2 MG tablet Take 1 tablet by mouth 2 times daily  Patient taking differently: Take 2 mg by mouth See Admin Instructions Takes at dinner and HS 5/29/18  Yes Lizeth Bustamante MD   cycloSPORINE (RESTASIS) 0.05 % ophthalmic emulsion Place 1 drop into both eyes 2 times daily    Yes Historical Provider, MD   calcium carbonate (OSCAL) 500 MG TABS tablet Take 1 tablet by mouth daily 5/12/16  Yes Juan Carlos Segura PA-C   Insulin Pen Needle (PEN NEEDLES) 31G X 8 MM MISC 1 pen by Does not apply route 2 times daily 4/4/19   Lizeth Bustamante MD   Glucose Blood (BLOOD GLUCOSE TEST STRIPS) STRP Check BS 4x a day 6/5/18   Lizeth Bustamante MD   ONE TOUCH ULTRA TEST strip TO CHECK BLOOD SUGAR 4X DAILY WITH CURRENT GLUCOMETER: DX: DIABETES TYPE II E11.9 6/4/18   Lizeth Bustamante MD   acetaminophen (TYLENOL) 500 MG tablet Take 500 mg by mouth every 6 hours as needed for Pain    Historical Provider, MD   fluticasone (VERAMYST) 27.5 MCG/SPRAY nasal spray 2 sprays by Nasal route daily    Historical Provider, MD   Glucose Blood (BLOOD GLUCOSE TEST STRIPS) STRP To check blood sugar 4x daily with current Glucometer: DX: diabetes type .00 5/29/18   Aurora Recinos MD   pimecrolimus (ELIDEL) 1 % cream Apply topically 2 times daily Apply topically 2 times daily. 11/30/17   Aurora Recinos MD   Blood Glucose Monitoring Suppl DOMINIQUE One device 5/26/17   Aurora Recinos MD   Lancets MISC Use 1 lancet 3 times daily 5/26/17   Aurora Recinos MD     Data:     Laboratory this visit:  Reviewed  Recent Labs     03/12/21  1140   WBC 6.8   HGB 14.9   HCT 43.1         Recent Labs     03/12/21  1140   *   K 3.9   CL 98*   CO2 28   BUN 21   CREATININE 0.9     Recent Labs     03/12/21  1140   AST 25   ALT 19   BILITOT 0.7   ALKPHOS 40     No results for input(s): INR in the last 72 hours. No results for input(s): CKTOTAL, CKMB, CKMBINDEX in the last 72 hours. Invalid input(s): Sandi Montalvo input(s): PRO-BNP    Radiology this visit:  Reviewed. Xr Lumbar Spine (2-3 Views)    Result Date: 3/10/2021  EXAMINATION: THREE XRAY VIEWS OF THE LUMBAR SPINE 3/10/2021 10:31 am COMPARISON: None. HISTORY: ORDERING SYSTEM PROVIDED HISTORY: low back pain TECHNOLOGIST PROVIDED HISTORY: Reason for exam:->low back pain Lower back pain. Initial study. FINDINGS: No fracture or spondylolisthesis is demonstrated. There is mild-to-moderate multilevel degenerative disc disease. Lower lumbar facet joint arthropathy is noted. Vascular atherosclerotic calcifications are also seen. Mild-to-moderate degenerative disc disease. No acute osseous abnormality of the lumbar spine. Xr Hip Right (2-3 Views)    Result Date: 3/12/2021  EXAMINATION: TWO XRAY VIEWS OF THE RIGHT HIP 3/12/2021 1:52 pm COMPARISON: Pelvis/right hip radiograph performed 03/10/2021. HISTORY: ORDERING SYSTEM PROVIDED HISTORY: trauma TECHNOLOGIST PROVIDED HISTORY: Reason for exam:->trauma Reason for Exam: back pain FINDINGS: No acute osseous abnormality demonstrated. The right hip joint is anatomically aligned without significant degenerative changes.   Few scattered surgical clips noted within the pelvis. No focal soft tissue abnormality. No acute abnormality of the pelvis or right hip. Ct Lumbar Spine Wo Contrast    Result Date: 3/12/2021  EXAMINATION: CT OF THE LUMBAR SPINE WITHOUT CONTRAST  3/12/2021 TECHNIQUE: CT of the lumbar spine was performed without the administration of intravenous contrast. Multiplanar reformatted images are provided for review. Dose modulation, iterative reconstruction, and/or weight based adjustment of the mA/kV was utilized to reduce the radiation dose to as low as reasonably achievable. COMPARISON: Lumbar spine radiograph performed March 10, 2021. HISTORY: ORDERING SYSTEM PROVIDED HISTORY: trauma TECHNOLOGIST PROVIDED HISTORY: Reason for exam:->trauma Decision Support Exception->Emergency Medical Condition (MA) Reason for Exam: CHRONIC BACK PAIN /// RT. LEG NUMBNESS Acuity: Chronic Type of Exam: Initial FINDINGS: BONES/ALIGNMENT: There is normal alignment of the spine. The vertebral body heights are maintained. No osseous destructive lesion is seen. DEGENERATIVE CHANGES: There is diffuse osteopenia. Mild to moderate multilevel degenerative changes, grossly unchanged from prior radiograph. Disc bulge at L3-4 contributes to moderate to severe canal narrowing. L1-2 and L2-3 disc bulge contributes to mild canal narrowing. No severe neural foraminal narrowing. SOFT TISSUES/RETROPERITONEUM: No paraspinal mass is seen. 1. No acute abnormality of the lumbar spine. 2. L3-4 disc bulge contributes to moderate to severe canal narrowing. Additional degenerative changes, as described above. Xr Hip 2-3 Vw W Pelvis Right    Result Date: 3/10/2021  EXAMINATION: ONE XRAY VIEW OF THE PELVIS AND TWO XRAY VIEWS RIGHT HIP 3/10/2021 10:31 am COMPARISON: None HISTORY: ORDERING SYSTEM PROVIDED HISTORY: right hip pain TECHNOLOGIST PROVIDED HISTORY: Reason for exam:->right hip pain FINDINGS: The bones are osteopenic.   The hip and SI joints are in anatomic alignment. No acute fracture is seen. Surgical clips are seen in the pelvis. Facet arthritis and disc disease lower lumbar spine is evident. Osteopenia. No acute osseous injury of the pelvis or right hip. EKG this visit:   EKG: No available ECG to review during assessment/interview. Please see ED notes.     Current Treatment Team:  Treatment Team: Attending Provider: Yasmeen Saenz MD; Triage Provider: Nadya Garcia MD; Physician Assistant: Carrington Santos PA-C; Registered Nurse: ERAN Gordon APRN-BC   [facility]  3/12/2021 3:46 PM      Electronically signed by RORO Cooley CNP on 3/12/2021 at 3:46 PM

## 2021-03-12 NOTE — ED PROVIDER NOTES
neurop[facility] ([facility] (1996), GERD (gastroesophageal reflux disease), Hypertension, Malignant lymphoplasmacytic lymphoma ([facility] (08/2015), RLS (restless legs syndrome), and Uterine cancer ([facility] (1987). Past surgical history:  has a past surgical history that includes Hysterectomy; Tubal ligation; and Hand surgery (12/2016). Home medications:   Prior to Admission medications    Medication Sig Start Date End Date Taking? Authorizing Provider   valsartan (DIOVAN) 160 MG tablet Take 160 mg by mouth daily    Historical Provider, MD   HYDROcodone-acetaminophen (NORCO) 5-325 MG per tablet Take 1 tablet by mouth every 6 hours as needed for Pain for up to 3 days. Intended supply: 3 days.  Take lowest dose possible to manage pain 3/10/21 3/13/21  Esther Alvarado MD   methocarbamol (ROBAXIN) 500 MG tablet Take 1 tablet by mouth 4 times daily as needed (pain and/or spasms) 3/10/21 3/20/21  Esther Alvarado MD   lidocaine (LIDODERM) 5 % Place 1 patch onto the skin daily for 10 days 12 hours on, 12 hours off. 3/10/21 3/20/21  Esther Alvarado MD   simvastatin (ZOCOR) 10 MG tablet Take 10 mg by mouth nightly    Historical Provider, MD   Insulin Pen Needle (PEN NEEDLES) 31G X 8 MM MISC 1 pen by Does not apply route 2 times daily 4/4/19   Yonathan Hernadez MD   pantoprazole (PROTONIX) 40 MG tablet TAKE 1 TABLET DAILY 9/28/18   Yonathan Hernadez MD   Glucose Blood (BLOOD GLUCOSE TEST STRIPS) STRP Check BS 4x a day 6/5/18   Yonathan Hernadez MD   ONE TOUCH ULTRA TEST strip TO CHECK BLOOD SUGAR 4X DAILY WITH CURRENT GLUCOMETER: DX: DIABETES TYPE II E11.9 6/4/18   Yonathan Hernadez MD   Multiple Vitamins-Minerals (THERAPEUTIC MULTIVITAMIN-MINERALS) tablet Take 1 tablet by mouth daily    Historical Provider, MD   acetaminophen (TYLENOL) 500 MG tablet Take 500 mg by mouth every 6 hours as needed for Pain    Historical Provider, MD   fluticasone (VERAMYST) 27.5 MCG/SPRAY nasal spray 2 sprays by Nasal route daily    Historical Provider, smoking about 55 years ago. She has a 31.00 pack-year smoking history. She has never used smokeless tobacco. She reports that she does not drink alcohol or use drugs. Family history:    Family History   Problem Relation Age of Onset    Cancer Mother 46        breast cancer,  @ age 47    Asthma Father     Cancer Father [de-identified]        colon cancer    High Blood Pressure Father     High Blood Pressure Sister     Vision Loss Maternal Grandmother         glaucoma    Arthritis Sister     Diabetes Sister     Cancer Sister 61        breast, then mastitized to liver,  from complications    High Blood Pressure Sister     Diabetes Sister     High Blood Pressure Sister          Exam  /60   Pulse 80   Temp 99.1 °F (37.3 °C) (Oral)   Resp 20   SpO2 98%   Nursing note and vitals reviewed. Constitutional: Well developed, well nourished. No acute distress. HENT:      Head: Normocephalic and atraumatic. Ears: External ears normal.      Nose: Nose normal.     Mouth: Membrane mucosa moist and pink. No posterior oropharynx erythema or tonsillar edema  Eyes: Anicteric sclera. No discharge, PERRL  Neck: Supple. Trachea midline. Cardiovascular: RRR, no murmurs, rubs, or gallops, radial pulses 2+ bilaterally. Pulmonary/Chest: Effort normal. No respiratory distress. CTAB. No stridor. No wheezes. No rales. Abdominal: Soft. Nontender to palpation. No distension. No guarding, rebound tenderness, or evidence of ascites. : No CVA tenderness. Musculoskeletal: Moves all extremities. No gross deformity. No tenderness palpation of the cervical, thoracic, lumbar spine. There is tenderness palpation of the right lower lumbar spine and right buttock. Neurological: Alert and oriented to person, place, and time. Normal muscle tone.   BLE sensation intact throughout but diminished over the right anterior thigh and shin, lateral hip.    -  High Sensitivity Neuro Exam Lumbar Spine   L1-L2 - Inner thigh sensation - Intact Bilaterally   L2 - Adduct Thigh - 5/5 Bilaterally   L3 - Extend knee - 5/5 Bilaterally   L4 - Dorsiflex ankle - 5/5 Bilaterally   L5 - Dorsiflex great toe at 1st MCP - 5/5 Bilaterally   L2-L4 - Patellar reflex - 2+ bilaterally.  S1 - Knee flexion - 5/5 Bilaterally   S1 - Achilles reflex - 2+ bilaterally.  S2 - Plantar flexion of toes - 5/5 Bilaterally   S3-S5 - groin, perineal sensation (per patient) - Intact Bilaterally  Skin: Warm and dry. No rash. Psychiatric: Normal mood and affect. Behavior is normal.      Radiographs (if obtained):  [] The following radiograph was interpreted by myself in the absence of a radiologist:   [x] Radiologist's Report Reviewed:  XR HIP RIGHT (2-3 VIEWS)   Final Result   No acute abnormality of the pelvis or right hip. CT LUMBAR SPINE WO CONTRAST   Final Result   1. No acute abnormality of the lumbar spine. 2. L3-4 disc bulge contributes to moderate to severe canal narrowing. Additional degenerative changes, as described above.                 Labs  Results for orders placed or performed during the hospital encounter of 03/12/21   CBC Auto Differential   Result Value Ref Range    WBC 6.8 4.0 - 10.5 K/CU MM    RBC 4.38 4.2 - 5.4 M/CU MM    Hemoglobin 14.9 12.5 - 16.0 GM/DL    Hematocrit 43.1 37 - 47 %    MCV 98.4 78 - 100 FL    MCH 34.0 (H) 27 - 31 PG    MCHC 34.6 32.0 - 36.0 %    RDW 13.1 11.7 - 14.9 %    Platelets 698 447 - 062 K/CU MM    MPV 9.1 7.5 - 11.1 FL    Differential Type AUTOMATED DIFFERENTIAL     Segs Relative 76.6 (H) 36 - 66 %    Lymphocytes % 15.6 (L) 24 - 44 %    Monocytes % 6.2 (H) 0 - 4 %    Eosinophils % 1.0 0 - 3 %    Basophils % 0.3 0 - 1 %    Segs Absolute 5.2 K/CU MM    Lymphocytes Absolute 1.1 K/CU MM    Monocytes Absolute 0.4 K/CU MM    Eosinophils Absolute 0.1 K/CU MM    Basophils Absolute 0.0 K/CU MM    Nucleated RBC % 0.0 %    Total Nucleated RBC 0.0 K/CU MM    Total Immature Neutrophil 0.02 K/CU MM    Immature Neutrophil % 0.3 0 - 0.43 %   CMP   Result Value Ref Range    Sodium 133 (L) 135 - 145 MMOL/L    Potassium 3.9 3.5 - 5.1 MMOL/L    Chloride 98 (L) 99 - 110 mMol/L    CO2 28 21 - 32 MMOL/L    BUN 21 6 - 23 MG/DL    CREATININE 0.9 0.6 - 1.1 MG/DL    Glucose 124 (H) 70 - 99 MG/DL    Calcium 9.6 8.3 - 10.6 MG/DL    Albumin 4.3 3.4 - 5.0 GM/DL    Total Protein 6.2 (L) 6.4 - 8.2 GM/DL    Total Bilirubin 0.7 0.0 - 1.0 MG/DL    ALT 19 10 - 40 U/L    AST 25 15 - 37 IU/L    Alkaline Phosphatase 40 40 - 129 IU/L    GFR Non-African American >60 >60 mL/min/1.73m2    GFR African American >60 >60 mL/min/1.73m2    Anion Gap 7 4 - 16         MDM  Patient presents for back pain after chiropractic techniques. She does appear extremely uncomfortable, frequently crying out in room, massaging right hamstring. Imaging shows L3-L4 disc bulging which does correspond with her areas of pain. Given Norflex, Norco in ED and pain improved but then recurred significantly during hip x-ray. Morphine ordered. Given Decadron. Plan is to admit for pain control. Consult placed to Lizzy MALLOY who agreed to admit. In consideration of current COVID19 pandemic, with effort to minimize unnecessary provider exposure, this patient was seen at bedside by me independently. However, in compliance with current hospital [facility]/ED protocol, prior to admission I did discuss this patient case with emergency department physician, Dr. Latasha Caprio. Of note, this Pt was NOT admitted to the ICU. Final Impression  1. Herniated nucleus pulposus, L3-4 right        Blood pressure 115/60, pulse 80, temperature 99.1 °F (37.3 °C), temperature source Oral, resp. rate 20, SpO2 98 %, not currently breastfeeding. Disposition:  Admit to med/surg floor in stable condition. Patient was given scripts for the following medications. I counseled patient how to take these medications.      New Prescriptions    No medications on file       This chart was generated using the Dragon dictation system. I created this record but it may contain dictation errors given the limitations of this technology.        Holden Norton PA-C  03/12/21 2083

## 2021-03-12 NOTE — ED NOTES
Pt stating that had muscle relaxer  Around 9 am and Richard LOVE notified, new order to hold norflex for now. Will  Con to monitor.      Matilda Arias RN  03/12/21 [id number]

## 2021-03-12 NOTE — ED NOTES
Pt arrives to Ed for sciatica back pain. Pain in R leg. Pt has an order for xray of her neck but states her pain is too great in her hip and leg.       Asha Yao RN  03/12/21 [address], RN  03/12/21 1021

## 2021-03-12 NOTE — ED NOTES
Report given to [facility] for room [facility]11 and denies further questions.      Betito Bruce RN  03/12/21 8783

## 2021-03-12 NOTE — PROGRESS NOTES
Medication History  [facility]    Patient Name: Yovani Jasso 1945     Medication history has been completed by: Loida Oakley CPhT    Source(s) of information: patient and insurance claims     Primary Care Physician: Norma Dior MD     Pharmacy: [facility]     Allergies as of 03/12/2021 - Review Complete 03/12/2021   Allergen Reaction Noted    Latex  02/06/2012    Triamterene  05/11/2016        Prior to Admission medications    Medication Sig Start Date End Date Taking? Authorizing Provider   valsartan-hydroCHLOROthiazide (DIOVAN-HCT) 160-12.5 MG per tablet Take 1 tablet by mouth daily   Yes Historical Provider, MD   B Complex Vitamins (VITAMIN B COMPLEX PO) Take 1 tablet by mouth 2 times daily   Yes Historical Provider, MD   HYDROcodone-acetaminophen (NORCO) 5-325 MG per tablet Take 1 tablet by mouth every 6 hours as needed  Take lowest dose possible to manage pain 3/10/21 3/13/21 Yes Padmini Monroe MD   methocarbamol (ROBAXIN) 500 MG tablet Take 1 tablet by mouth 4 times daily as needed (pain and/or spasms) 3/10/21 3/20/21 Yes Padmini Monroe MD   lidocaine (LIDODERM) 5 % Place 1 patch onto the skin daily for 10 days 12 hours on, 12 hours off. 3/10/21 3/20/21 Yes Padmini Monroe MD   simvastatin (ZOCOR) 5 MG tablet Take 5 mg by mouth nightly    Yes Historical Provider, MD   Multiple Vitamins-Minerals (THERAPEUTIC MULTIVITAMIN-MINERALS) tablet Take 1 tablet by mouth daily   Yes Historical Provider, MD   insulin aspart (NOVOLOG FLEXPEN) 100 UNIT/ML injection pen Inject 20 Units into the skin 3 times daily (before meals) 5/29/18  Yes Rosalee Craig MD   Insulin Degludec (TRESIBA FLEXTOUCH) 200 UNIT/ML SOPN  Inject 40 Units into the skin daily  5/29/18  Yes Rosalee Craig MD   gabapentin (NEURONTIN) 300 MG capsule Take 400 mg by mouth 3 times daily.   5/29/18 3/12/21 Yes Rosalee Craig MD   alendronate (FOSAMAX) 70 MG tablet Take 70 mg by mouth every 7 days Takes on Fridays 5/29/18  Yes Yenni Mahajan MD   rOPINIRole (REQUIP) 2 MG tablet Take 1 tablet by mouth 2 times daily 5/29/18  Yes Yenni Mahajan MD   cycloSPORINE (RESTASIS) 0.05 % ophthalmic emulsion Place 1 drop into both eyes 2 times daily    Yes Historical Provider, MD   calcium carbonate (OSCAL) 500 MG TABS tablet Take 1 tablet by mouth daily 5/12/16  Yes Juan Carlos Segura PA-C   acetaminophen (TYLENOL) 500 MG tablet Take 500 mg by mouth every 6 hours as needed for Pain    Historical Provider, MD   fluticasone (VERAMYST) 27.5 MCG/SPRAY nasal spray 2 sprays by Nasal route daily    Historical Provider, MD   pimecrolimus (ELIDEL) 1 % cream Apply topically 2 times daily Apply topically 2 times daily. 11/30/17   Yenni Mahajan MD     Medications added or changed (ex. new medication, dosage change, interval change, formulation change): Losartan/Hctz changed to Valsartan/Hctz   Pantoprazole changed to Omeprazole OTC  Vitamin B complex (added)    Medications removed from list (include reason, ex. noncompliance, medication cost, therapy complete etc.):   Valsartan changed to above  Mucinex patient not taking  Trazodone patient not taking    Comments:  Medication list reviewed with patient and insurance claims verified. Insulin dosage updated per information received. Patient states she has taken first dose of medications today.     To my knowledge the above medication history is accurate as of 3/12/2021 3:00 PM.   Aneesh Ames CPhT   3/12/2021 3:00 PM   lissette

## 2021-03-13 LAB
GLUCOSE BLD-MCNC: 114 MG/DL (ref 70–99)
GLUCOSE BLD-MCNC: 114 MG/DL (ref 70–99)
GLUCOSE BLD-MCNC: 145 MG/DL (ref 70–99)
GLUCOSE BLD-MCNC: 93 MG/DL (ref 70–99)

## 2021-03-13 PROCEDURE — 2580000003 HC RX 258: Performed by: NURSE PRACTITIONER

## 2021-03-13 PROCEDURE — 82962 GLUCOSE BLOOD TEST: CPT

## 2021-03-13 PROCEDURE — 6370000000 HC RX 637 (ALT 250 FOR IP): Performed by: NURSE PRACTITIONER

## 2021-03-13 PROCEDURE — 94761 N-INVAS EAR/PLS OXIMETRY MLT: CPT

## 2021-03-13 PROCEDURE — 6360000002 HC RX W HCPCS: Performed by: NURSE PRACTITIONER

## 2021-03-13 PROCEDURE — 6370000000 HC RX 637 (ALT 250 FOR IP): Performed by: HOSPITALIST

## 2021-03-13 PROCEDURE — 1200000000 HC SEMI PRIVATE

## 2021-03-13 RX ORDER — ROPINIROLE 1 MG/1
2 TABLET, FILM COATED ORAL 3 TIMES DAILY
Status: DISCONTINUED | OUTPATIENT
Start: 2021-03-13 | End: 2021-03-18 | Stop reason: HOSPADM

## 2021-03-13 RX ORDER — SENNA AND DOCUSATE SODIUM 50; 8.6 MG/1; MG/1
2 TABLET, FILM COATED ORAL DAILY PRN
Status: DISCONTINUED | OUTPATIENT
Start: 2021-03-13 | End: 2021-03-18 | Stop reason: HOSPADM

## 2021-03-13 RX ORDER — POLYETHYLENE GLYCOL 3350 17 G/17G
17 POWDER, FOR SOLUTION ORAL DAILY
Status: DISCONTINUED | OUTPATIENT
Start: 2021-03-13 | End: 2021-03-18 | Stop reason: HOSPADM

## 2021-03-13 RX ADMIN — VALSARTAN AND HYDROCHLOROTHIAZIDE 1 TABLET: 160; 12.5 TABLET, FILM COATED ORAL at 12:41

## 2021-03-13 RX ADMIN — HYDROCODONE BITARTRATE AND ACETAMINOPHEN 1 TABLET: 5; 325 TABLET ORAL at 22:14

## 2021-03-13 RX ADMIN — GABAPENTIN 400 MG: 400 CAPSULE ORAL at 14:24

## 2021-03-13 RX ADMIN — BISACODYL 5 MG: 5 TABLET, COATED ORAL at 09:22

## 2021-03-13 RX ADMIN — METHOCARBAMOL TABLETS 500 MG: 500 TABLET, COATED ORAL at 10:59

## 2021-03-13 RX ADMIN — ENOXAPARIN SODIUM 30 MG: 30 INJECTION SUBCUTANEOUS at 09:20

## 2021-03-13 RX ADMIN — HYDROCODONE BITARTRATE AND ACETAMINOPHEN 1 TABLET: 5; 325 TABLET ORAL at 09:37

## 2021-03-13 RX ADMIN — ROPINIROLE HYDROCHLORIDE 2 MG: 1 TABLET, FILM COATED ORAL at 20:08

## 2021-03-13 RX ADMIN — GABAPENTIN 400 MG: 400 CAPSULE ORAL at 20:08

## 2021-03-13 RX ADMIN — HYDROCODONE BITARTRATE AND ACETAMINOPHEN 1 TABLET: 5; 325 TABLET ORAL at 00:49

## 2021-03-13 RX ADMIN — METHOCARBAMOL TABLETS 500 MG: 500 TABLET, COATED ORAL at 20:09

## 2021-03-13 RX ADMIN — TRAMADOL HYDROCHLORIDE 25 MG: 50 TABLET, FILM COATED ORAL at 14:21

## 2021-03-13 RX ADMIN — POLYVINYL ALCOHOL 1 DROP: 14 SOLUTION/ DROPS OPHTHALMIC at 20:12

## 2021-03-13 RX ADMIN — ROPINIROLE HYDROCHLORIDE 2 MG: 1 TABLET, FILM COATED ORAL at 14:24

## 2021-03-13 RX ADMIN — SODIUM CHLORIDE, PRESERVATIVE FREE 10 ML: 5 INJECTION INTRAVENOUS at 09:22

## 2021-03-13 RX ADMIN — ATORVASTATIN CALCIUM 5 MG: 10 TABLET, FILM COATED ORAL at 20:09

## 2021-03-13 RX ADMIN — MULTIPLE VITAMINS W/ MINERALS TAB 1 TABLET: TAB at 09:03

## 2021-03-13 RX ADMIN — GABAPENTIN 400 MG: 400 CAPSULE ORAL at 09:20

## 2021-03-13 RX ADMIN — METHOCARBAMOL TABLETS 500 MG: 500 TABLET, COATED ORAL at 02:15

## 2021-03-13 RX ADMIN — PANTOPRAZOLE SODIUM 40 MG: 40 TABLET, DELAYED RELEASE ORAL at 06:04

## 2021-03-13 RX ADMIN — HYDROCODONE BITARTRATE AND ACETAMINOPHEN 1 TABLET: 5; 325 TABLET ORAL at 16:26

## 2021-03-13 RX ADMIN — POLYVINYL ALCOHOL 1 DROP: 14 SOLUTION/ DROPS OPHTHALMIC at 12:40

## 2021-03-13 RX ADMIN — SODIUM CHLORIDE, PRESERVATIVE FREE 10 ML: 5 INJECTION INTRAVENOUS at 20:13

## 2021-03-13 RX ADMIN — POLYETHYLENE GLYCOL (3350) 17 G: 17 POWDER, FOR SOLUTION ORAL at 14:23

## 2021-03-13 RX ADMIN — TRAMADOL HYDROCHLORIDE 25 MG: 50 TABLET, FILM COATED ORAL at 06:10

## 2021-03-13 RX ADMIN — Medication 500 MG: at 09:02

## 2021-03-13 ASSESSMENT — PAIN SCALES - GENERAL
PAINLEVEL_OUTOF10: 0
PAINLEVEL_OUTOF10: 5
PAINLEVEL_OUTOF10: 10
PAINLEVEL_OUTOF10: 7
PAINLEVEL_OUTOF10: 8
PAINLEVEL_OUTOF10: 5
PAINLEVEL_OUTOF10: 5

## 2021-03-13 ASSESSMENT — PAIN DESCRIPTION - ORIENTATION
ORIENTATION: RIGHT
ORIENTATION: RIGHT
ORIENTATION: RIGHT;LEFT
ORIENTATION: RIGHT
ORIENTATION: RIGHT

## 2021-03-13 ASSESSMENT — PAIN DESCRIPTION - FREQUENCY: FREQUENCY: CONTINUOUS

## 2021-03-13 ASSESSMENT — PAIN DESCRIPTION - PROGRESSION: CLINICAL_PROGRESSION: NOT CHANGED

## 2021-03-13 ASSESSMENT — PAIN DESCRIPTION - LOCATION: LOCATION: LEG

## 2021-03-13 ASSESSMENT — PAIN DESCRIPTION - PAIN TYPE: TYPE: CHRONIC PAIN

## 2021-03-13 ASSESSMENT — PAIN DESCRIPTION - ONSET: ONSET: ON-GOING

## 2021-03-13 NOTE — CARE COORDINATION
CM met with the patient for discharge planning. Patient lives alone in a 2 bedroom condo (1 small step), has insurance with Rx coverage & PCP, stated that she was independent with ADL's prior to admission, and still drives. Patient stated that she was not requiring the use of any assistive devices or home oxygen prior to admission. Patient stated that she has not spoken to the neurosurgeon but plans to return home upon discharge and would like to receive PT/OT through a [facility] if that is recommended by her physician. Patient stated that she does not have any preferences regarding the [facility] selected. CM will follow.

## 2021-03-13 NOTE — PROGRESS NOTES
Progress Note  Date:3/13/2021       ESZW:[id number]  Patient Name:Megan Gong [facility]     YOB: 1945     Age:75 y.o. Still has back pain  Subjective    Subjective:  Symptoms:  Stable. Diet:  Poor intake. Pain:  She complains of pain that is moderate. Review of Systems  Objective         Vitals Last 24 Hours:  TEMPERATURE:  Temp  Av.3 °F (36.8 °C)  Min: 97.8 °F (36.6 °C)  Max: 99.1 °F (37.3 °C)  RESPIRATIONS RANGE: Resp  Av.5  Min: 16  Max: 20  PULSE OXIMETRY RANGE: SpO2  Av.8 %  Min: 94 %  Max: 99 %  PULSE RANGE: Pulse  Av.5  Min: 59  Max: 80  BLOOD PRESSURE RANGE: Systolic (24NJR), VFM:486 , Min:115 , WBN:809   ; Diastolic (40XGS), FYO:74, Min:60, Max:79    I/O (24Hr): Intake/Output Summary (Last 24 hours) at 3/13/2021 0716  Last data filed at 3/13/2021 0401  Gross per 24 hour   Intake --   Output 1300 ml   Net -1300 ml     Objective:  General Appearance:  Comfortable. Vital signs: (most recent): Blood pressure 137/63, pulse 58, temperature 98.2 °F (36.8 °C), temperature source Oral, resp. rate 18, height 5' 1\" (1.549 m), weight 150 lb (68 kg), SpO2 96 %, not currently breastfeeding. Vital signs are normal.    HEENT: Normal HEENT exam.    Lungs:  Normal effort. Heart: Normal rate. Abdomen: Abdomen is soft. Extremities: Decreased range of motion. (Decreased movement right leg on flexion of both hip and knee. Pulse and sensation intact)  Neurological: Patient is alert. Pupils:  Pupils are equal, round, and reactive to light. Skin:  Warm. Labs/Imaging/Diagnostics    Labs:  CBC:  Recent Labs     21  1140   WBC 6.8   RBC 4.38   HGB 14.9   HCT 43.1   MCV 98.4   RDW 13.1        CHEMISTRIES:  Recent Labs     21  1140   *   K 3.9   CL 98*   CO2 28   BUN 21   CREATININE 0.9   GLUCOSE 124*     PT/INR:No results for input(s): PROTIME, INR in the last 72 hours. APTT:No results for input(s): APTT in the last 72 hours.   LIVER PROFILE:  Recent Labs     03/12/21  1140   AST 25   ALT 19   BILITOT 0.7   ALKPHOS 40       Imaging Last 24 Hours:  Xr Hip Right (2-3 Views)    Result Date: 3/12/2021  EXAMINATION: TWO XRAY VIEWS OF THE RIGHT HIP 3/12/2021 1:52 pm COMPARISON: Pelvis/right hip radiograph performed 03/10/2021. HISTORY: ORDERING SYSTEM PROVIDED HISTORY: trauma TECHNOLOGIST PROVIDED HISTORY: Reason for exam:->trauma Reason for Exam: back pain FINDINGS: No acute osseous abnormality demonstrated. The right hip joint is anatomically aligned without significant degenerative changes. Few scattered surgical clips noted within the pelvis. No focal soft tissue abnormality. No acute abnormality of the pelvis or right hip. Ct Lumbar Spine Wo Contrast    Result Date: 3/12/2021  EXAMINATION: CT OF THE LUMBAR SPINE WITHOUT CONTRAST  3/12/2021 TECHNIQUE: CT of the lumbar spine was performed without the administration of intravenous contrast. Multiplanar reformatted images are provided for review. Dose modulation, iterative reconstruction, and/or weight based adjustment of the mA/kV was utilized to reduce the radiation dose to as low as reasonably achievable. COMPARISON: Lumbar spine radiograph performed March 10, 2021. HISTORY: ORDERING SYSTEM PROVIDED HISTORY: trauma TECHNOLOGIST PROVIDED HISTORY: Reason for exam:->trauma Decision Support Exception->Emergency Medical Condition (MA) Reason for Exam: CHRONIC BACK PAIN /// RT. LEG NUMBNESS Acuity: Chronic Type of Exam: Initial FINDINGS: BONES/ALIGNMENT: There is normal alignment of the spine. The vertebral body heights are maintained. No osseous destructive lesion is seen. DEGENERATIVE CHANGES: There is diffuse osteopenia. Mild to moderate multilevel degenerative changes, grossly unchanged from prior radiograph. Disc bulge at L3-4 contributes to moderate to severe canal narrowing. L1-2 and L2-3 disc bulge contributes to mild canal narrowing. No severe neural foraminal narrowing.  SOFT TISSUES/RETROPERITONEUM: No paraspinal mass is seen. 1. No acute abnormality of the lumbar spine. 2. L3-4 disc bulge contributes to moderate to severe canal narrowing. Additional degenerative changes, as described above. Mri Lumbar Spine Wo Contrast    Result Date: 3/12/2021  EXAMINATION: MRI OF THE LUMBAR SPINE WITHOUT CONTRAST, 3/12/2021 4:59 pm TECHNIQUE: Multiplanar multisequence MRI of the lumbar spine was performed without the administration of intravenous contrast. COMPARISON: CT earlier today. HISTORY: ORDERING SYSTEM PROVIDED HISTORY: back pain, concern for HNP TECHNOLOGIST PROVIDED HISTORY: Reason for exam:->back pain, concern for HNP Reason for Exam: numbness in legs - went to chiropractor and had severe pain unable to walk FINDINGS: BONES/ALIGNMENT: There is normal alignment of the spine. The vertebral body heights are maintained. The bone marrow signal appears unremarkable. SPINAL CORD: The conus terminates normally at the upper L2 level. The visualized spinal cord has normal signal and morphology. No evidence of mass or abnormal fluid collection within the spinal canal. SOFT TISSUES: There is minimal soft tissue edema about the hypertrophic L3-4 facet joints, likely reactive. No fluid collection or mass is evident. T11-12: Mild disc height loss and desiccation. No neural foraminal narrowing. Moderate spinal canal stenosis secondary to a left posterior paracentral disc protrusion and facet and ligamentum flavum hypertrophy. T12-L1: Mild disc height loss and desiccation. No neural foraminal narrowing. Moderate spinal canal stenosis secondary to a left posterior paracentral disc protrusion. L1-L2: Moderate disc height loss and desiccation. Mild bilateral neural foraminal narrowing secondary to disc bulge. Severe spinal canal stenosis secondary to disc bulge and facet and ligamentum flavum hypertrophy. L2-L3: Mild disc height loss and desiccation.   Moderate left and mild right neural foraminal narrowing secondary to disc bulge and facet hypertrophy. Moderate spinal canal stenosis secondary to disc bulge and facet and ligamentum flavum hypertrophy. L3-L4: Mild disc height loss and desiccation. Mild left and severe right neural foraminal narrowing secondary to disc bulge and facet hypertrophy. Severe spinal canal stenosis secondary to disc bulge and facet and ligamentum flavum hypertrophy. L4-L5: Disc desiccation without height loss. No left neural foraminal narrowing. Mild right neural foraminal narrowing secondary to disc bulge. Moderate spinal canal stenosis secondary to disc bulge and facet and ligamentum flavum hypertrophy. L5-S1: Mild disc height loss and desiccation. Moderate left neural foraminal narrowing secondary to facet hypertrophy. No right neural foraminal narrowing. No spinal canal stenosis. 1. Mild-to-moderate multilevel degenerative disease and facet arthropathy. 2. Spinal canal stenoses, severe at L1-2 and L3-4 and moderate at T11-12, T12-L1, L2-3, and L4-5. 3. Multilevel neural foraminal narrowing as detailed above and greatest involving the right L3 neural foramen where it is severe.      Assessment//Plan           Hospital Problems           Last Modified POA    * (Principal) Intractable back pain 3/12/2021 Yes    Hyponatremia 3/12/2021 Yes        Assessment & Plan  Intractable pain due to severe spinal stenosis  -continue pain management  -add regimen for constipaton  -hyponatremia  -mild and stop IVF  DM  -SSI  Neuropathy diabetic  HTN  -ARB thiazide  HPL  DVT prophylaxis  -lovenox      Pt/OT eval as if continues to lie in bed then can develop muscle spasms which can further limit activity and worsen pain  Electronically signed by Simi Tyson MD on 3/13/21 at 7:16 AM EST

## 2021-03-13 NOTE — PLAN OF CARE
Problem: Pain:  Goal: Pain level will decrease  Description: Pain level will decrease  Outcome: Ongoing  Goal: Control of acute pain  Description: Control of acute pain  Outcome: Ongoing  Goal: Control of chronic pain  Description: Control of chronic pain  Outcome: Ongoing     Problem: Pain:  Goal: Control of acute pain  Description: Control of acute pain  Outcome: Ongoing     Problem: Pain:  Goal: Control of chronic pain  Description: Control of chronic pain  Outcome: Ongoing

## 2021-03-14 LAB
GLUCOSE BLD-MCNC: 114 MG/DL (ref 70–99)
GLUCOSE BLD-MCNC: 119 MG/DL (ref 70–99)
GLUCOSE BLD-MCNC: 138 MG/DL (ref 70–99)
GLUCOSE BLD-MCNC: 140 MG/DL (ref 70–99)
GLUCOSE BLD-MCNC: 189 MG/DL (ref 70–99)

## 2021-03-14 PROCEDURE — 1200000000 HC SEMI PRIVATE

## 2021-03-14 PROCEDURE — 82962 GLUCOSE BLOOD TEST: CPT

## 2021-03-14 PROCEDURE — 94761 N-INVAS EAR/PLS OXIMETRY MLT: CPT

## 2021-03-14 PROCEDURE — 6370000000 HC RX 637 (ALT 250 FOR IP): Performed by: HOSPITALIST

## 2021-03-14 PROCEDURE — 6360000002 HC RX W HCPCS: Performed by: NURSE PRACTITIONER

## 2021-03-14 PROCEDURE — 2580000003 HC RX 258: Performed by: NURSE PRACTITIONER

## 2021-03-14 PROCEDURE — 6370000000 HC RX 637 (ALT 250 FOR IP): Performed by: NURSE PRACTITIONER

## 2021-03-14 RX ORDER — LACTULOSE 10 G/15ML
20 SOLUTION ORAL 3 TIMES DAILY
Status: DISCONTINUED | OUTPATIENT
Start: 2021-03-14 | End: 2021-03-18 | Stop reason: HOSPADM

## 2021-03-14 RX ADMIN — ROPINIROLE HYDROCHLORIDE 2 MG: 1 TABLET, FILM COATED ORAL at 15:00

## 2021-03-14 RX ADMIN — ENOXAPARIN SODIUM 30 MG: 30 INJECTION SUBCUTANEOUS at 08:20

## 2021-03-14 RX ADMIN — TRAMADOL HYDROCHLORIDE 25 MG: 50 TABLET, FILM COATED ORAL at 20:49

## 2021-03-14 RX ADMIN — GABAPENTIN 400 MG: 400 CAPSULE ORAL at 20:50

## 2021-03-14 RX ADMIN — LACTULOSE 20 G: 10 SOLUTION ORAL at 14:52

## 2021-03-14 RX ADMIN — GABAPENTIN 400 MG: 400 CAPSULE ORAL at 14:53

## 2021-03-14 RX ADMIN — ROPINIROLE HYDROCHLORIDE 2 MG: 1 TABLET, FILM COATED ORAL at 20:49

## 2021-03-14 RX ADMIN — SODIUM CHLORIDE, PRESERVATIVE FREE 10 ML: 5 INJECTION INTRAVENOUS at 20:50

## 2021-03-14 RX ADMIN — METHOCARBAMOL TABLETS 500 MG: 500 TABLET, COATED ORAL at 20:50

## 2021-03-14 RX ADMIN — HYDROCODONE BITARTRATE AND ACETAMINOPHEN 1 TABLET: 5; 325 TABLET ORAL at 15:32

## 2021-03-14 RX ADMIN — HYDROCODONE BITARTRATE AND ACETAMINOPHEN 1 TABLET: 5; 325 TABLET ORAL at 06:02

## 2021-03-14 RX ADMIN — TRAMADOL HYDROCHLORIDE 25 MG: 50 TABLET, FILM COATED ORAL at 01:15

## 2021-03-14 RX ADMIN — GABAPENTIN 400 MG: 400 CAPSULE ORAL at 08:20

## 2021-03-14 RX ADMIN — Medication 500 MG: at 08:20

## 2021-03-14 RX ADMIN — ATORVASTATIN CALCIUM 5 MG: 10 TABLET, FILM COATED ORAL at 20:50

## 2021-03-14 RX ADMIN — PANTOPRAZOLE SODIUM 40 MG: 40 TABLET, DELAYED RELEASE ORAL at 06:02

## 2021-03-14 RX ADMIN — MULTIPLE VITAMINS W/ MINERALS TAB 1 TABLET: TAB at 08:20

## 2021-03-14 RX ADMIN — POLYVINYL ALCOHOL 1 DROP: 14 SOLUTION/ DROPS OPHTHALMIC at 11:16

## 2021-03-14 RX ADMIN — POLYETHYLENE GLYCOL (3350) 17 G: 17 POWDER, FOR SOLUTION ORAL at 08:20

## 2021-03-14 RX ADMIN — SODIUM CHLORIDE, PRESERVATIVE FREE 10 ML: 5 INJECTION INTRAVENOUS at 08:20

## 2021-03-14 RX ADMIN — DOCUSATE SODIUM 50 MG AND SENNOSIDES 8.6 MG 2 TABLET: 8.6; 5 TABLET, FILM COATED ORAL at 20:49

## 2021-03-14 RX ADMIN — ROPINIROLE HYDROCHLORIDE 2 MG: 1 TABLET, FILM COATED ORAL at 08:19

## 2021-03-14 RX ADMIN — LACTULOSE 20 G: 10 SOLUTION ORAL at 20:49

## 2021-03-14 RX ADMIN — VALSARTAN AND HYDROCHLOROTHIAZIDE 1 TABLET: 160; 12.5 TABLET, FILM COATED ORAL at 11:15

## 2021-03-14 RX ADMIN — LACTULOSE 20 G: 10 SOLUTION ORAL at 12:08

## 2021-03-14 ASSESSMENT — PAIN DESCRIPTION - PAIN TYPE
TYPE: CHRONIC PAIN;ACUTE PAIN
TYPE: CHRONIC PAIN

## 2021-03-14 ASSESSMENT — PAIN DESCRIPTION - ONSET
ONSET: ON-GOING
ONSET: ON-GOING

## 2021-03-14 ASSESSMENT — PAIN - FUNCTIONAL ASSESSMENT
PAIN_FUNCTIONAL_ASSESSMENT: PREVENTS OR INTERFERES SOME ACTIVE ACTIVITIES AND ADLS
PAIN_FUNCTIONAL_ASSESSMENT: PREVENTS OR INTERFERES SOME ACTIVE ACTIVITIES AND ADLS

## 2021-03-14 ASSESSMENT — PAIN DESCRIPTION - FREQUENCY
FREQUENCY: CONTINUOUS
FREQUENCY: CONTINUOUS

## 2021-03-14 ASSESSMENT — PAIN SCALES - GENERAL
PAINLEVEL_OUTOF10: 0
PAINLEVEL_OUTOF10: 8
PAINLEVEL_OUTOF10: 0
PAINLEVEL_OUTOF10: 8

## 2021-03-14 ASSESSMENT — PAIN DESCRIPTION - LOCATION: LOCATION: BACK;LEG

## 2021-03-14 ASSESSMENT — PAIN DESCRIPTION - PROGRESSION: CLINICAL_PROGRESSION: NOT CHANGED

## 2021-03-14 ASSESSMENT — PAIN DESCRIPTION - ORIENTATION: ORIENTATION: RIGHT;LEFT

## 2021-03-14 NOTE — PROGRESS NOTES
Progress Note  Date:3/14/2021       QOYW:[id number]  Patient Name:Megan Rivera     YOB: 1945     Age:75 y.o. Still has back pain  Subjective    Subjective:  Symptoms:  Stable. Diet:  Poor intake. Pain:  She complains of pain that is moderate. Review of Systems  Objective         Vitals Last 24 Hours:  TEMPERATURE:  Temp  Av.2 °F (36.8 °C)  Min: 98 °F (36.7 °C)  Max: 98.3 °F (36.8 °C)  RESPIRATIONS RANGE: Resp  Av.8  Min: 16  Max: 18  PULSE OXIMETRY RANGE: SpO2  Av.8 %  Min: 96 %  Max: 98 %  PULSE RANGE: Pulse  Av.2  Min: 58  Max: 81  BLOOD PRESSURE RANGE: Systolic (14MKF), SCX:448 , Min:137 , MTC:559   ; Diastolic (17EOS), QQN:90, Min:62, Max:71    I/O (24Hr): Intake/Output Summary (Last 24 hours) at 3/14/2021 0801  Last data filed at 3/13/2021 1500  Gross per 24 hour   Intake 10 ml   Output 2350 ml   Net -2340 ml     Objective:  General Appearance:  Comfortable. Vital signs: (most recent): Blood pressure (!) 156/71, pulse 81, temperature 98.3 °F (36.8 °C), temperature source Oral, resp. rate 16, height 5' 1\" (1.549 m), weight 150 lb (68 kg), SpO2 97 %, not currently breastfeeding. Vital signs are normal.    HEENT: Normal HEENT exam.    Lungs:  Normal effort. Heart: Normal rate. Abdomen: Abdomen is soft. Extremities: Decreased range of motion. (Decreased movement right leg on flexion of both hip and knee. Pulse and sensation intact)  Neurological: Patient is alert. Pupils:  Pupils are equal, round, and reactive to light. Skin:  Warm. Labs/Imaging/Diagnostics    Labs:  CBC:  Recent Labs     21  1140   WBC 6.8   RBC 4.38   HGB 14.9   HCT 43.1   MCV 98.4   RDW 13.1        CHEMISTRIES:  Recent Labs     21  1140   *   K 3.9   CL 98*   CO2 28   BUN 21   CREATININE 0.9   GLUCOSE 124*     PT/INR:No results for input(s): PROTIME, INR in the last 72 hours. APTT:No results for input(s):  APTT in the last 72 hours.  LIVER PROFILE:  Recent Labs     03/12/21  1140   AST 25   ALT 19   BILITOT 0.7   ALKPHOS 40       Imaging Last 24 Hours:  Xr Hip Right (2-3 Views)    Result Date: 3/12/2021  EXAMINATION: TWO XRAY VIEWS OF THE RIGHT HIP 3/12/2021 1:52 pm COMPARISON: Pelvis/right hip radiograph performed 03/10/2021. HISTORY: ORDERING SYSTEM PROVIDED HISTORY: trauma TECHNOLOGIST PROVIDED HISTORY: Reason for exam:->trauma Reason for Exam: back pain FINDINGS: No acute osseous abnormality demonstrated. The right hip joint is anatomically aligned without significant degenerative changes. Few scattered surgical clips noted within the pelvis. No focal soft tissue abnormality. No acute abnormality of the pelvis or right hip. Ct Lumbar Spine Wo Contrast    Result Date: 3/12/2021  EXAMINATION: CT OF THE LUMBAR SPINE WITHOUT CONTRAST  3/12/2021 TECHNIQUE: CT of the lumbar spine was performed without the administration of intravenous contrast. Multiplanar reformatted images are provided for review. Dose modulation, iterative reconstruction, and/or weight based adjustment of the mA/kV was utilized to reduce the radiation dose to as low as reasonably achievable. COMPARISON: Lumbar spine radiograph performed March 10, 2021. HISTORY: ORDERING SYSTEM PROVIDED HISTORY: trauma TECHNOLOGIST PROVIDED HISTORY: Reason for exam:->trauma Decision Support Exception->Emergency Medical Condition (MA) Reason for Exam: CHRONIC BACK PAIN /// RT. LEG NUMBNESS Acuity: Chronic Type of Exam: Initial FINDINGS: BONES/ALIGNMENT: There is normal alignment of the spine. The vertebral body heights are maintained. No osseous destructive lesion is seen. DEGENERATIVE CHANGES: There is diffuse osteopenia. Mild to moderate multilevel degenerative changes, grossly unchanged from prior radiograph. Disc bulge at L3-4 contributes to moderate to severe canal narrowing. L1-2 and L2-3 disc bulge contributes to mild canal narrowing.   No severe neural foraminal narrowing. SOFT TISSUES/RETROPERITONEUM: No paraspinal mass is seen. 1. No acute abnormality of the lumbar spine. 2. L3-4 disc bulge contributes to moderate to severe canal narrowing. Additional degenerative changes, as described above. Mri Lumbar Spine Wo Contrast    Result Date: 3/12/2021  EXAMINATION: MRI OF THE LUMBAR SPINE WITHOUT CONTRAST, 3/12/2021 4:59 pm TECHNIQUE: Multiplanar multisequence MRI of the lumbar spine was performed without the administration of intravenous contrast. COMPARISON: CT earlier today. HISTORY: ORDERING SYSTEM PROVIDED HISTORY: back pain, concern for HNP TECHNOLOGIST PROVIDED HISTORY: Reason for exam:->back pain, concern for HNP Reason for Exam: numbness in legs - went to chiropractor and had severe pain unable to walk FINDINGS: BONES/ALIGNMENT: There is normal alignment of the spine. The vertebral body heights are maintained. The bone marrow signal appears unremarkable. SPINAL CORD: The conus terminates normally at the upper L2 level. The visualized spinal cord has normal signal and morphology. No evidence of mass or abnormal fluid collection within the spinal canal. SOFT TISSUES: There is minimal soft tissue edema about the hypertrophic L3-4 facet joints, likely reactive. No fluid collection or mass is evident. T11-12: Mild disc height loss and desiccation. No neural foraminal narrowing. Moderate spinal canal stenosis secondary to a left posterior paracentral disc protrusion and facet and ligamentum flavum hypertrophy. T12-L1: Mild disc height loss and desiccation. No neural foraminal narrowing. Moderate spinal canal stenosis secondary to a left posterior paracentral disc protrusion. L1-L2: Moderate disc height loss and desiccation. Mild bilateral neural foraminal narrowing secondary to disc bulge. Severe spinal canal stenosis secondary to disc bulge and facet and ligamentum flavum hypertrophy. L2-L3: Mild disc height loss and desiccation.   Moderate left and mild

## 2021-03-14 NOTE — PLAN OF CARE
Problem: Pain:  Goal: Pain level will decrease  Description: Pain level will decrease  3/14/2021 0506 by Cheyenne Gandara RN  Outcome: Ongoing  3/13/2021 1638 by Cyn Velazquez  Outcome: Ongoing  Goal: Control of acute pain  Description: Control of acute pain  3/14/2021 0506 by Cheyenne Gandara RN  Outcome: Ongoing  3/13/2021 1638 by Cyn Velazquez  Outcome: Ongoing  Goal: Control of chronic pain  Description: Control of chronic pain  3/14/2021 0506 by Cheyenne Gandara RN  Outcome: Ongoing  3/13/2021 1638 by Cyn Velazquez  Outcome: Ongoing     Problem: Falls - Risk of:  Goal: Will remain free from falls  Description: Will remain free from falls  Outcome: Ongoing  Goal: Absence of physical injury  Description: Absence of physical injury  Outcome: Ongoing

## 2021-03-15 ENCOUNTER — ANESTHESIA EVENT (OUTPATIENT)
Dept: OPERATING ROOM | Age: 76
DRG: 454 | End: 2021-03-15
Payer: MEDICARE

## 2021-03-15 LAB
ABO/RH: NORMAL
ANION GAP SERPL CALCULATED.3IONS-SCNC: 21 MMOL/L (ref 4–16)
ANTIBODY SCREEN: NEGATIVE
BACTERIA: NEGATIVE /HPF
BILIRUBIN URINE: NEGATIVE MG/DL
BLOOD, URINE: ABNORMAL
BUN BLDV-MCNC: 22 MG/DL (ref 6–23)
CALCIUM SERPL-MCNC: 8.7 MG/DL (ref 8.3–10.6)
CHLORIDE BLD-SCNC: 95 MMOL/L (ref 99–110)
CLARITY: CLEAR
CO2: 16 MMOL/L (ref 21–32)
COLOR: YELLOW
CREAT SERPL-MCNC: 0.9 MG/DL (ref 0.6–1.1)
GFR AFRICAN AMERICAN: >60 ML/MIN/1.73M2
GFR NON-AFRICAN AMERICAN: >60 ML/MIN/1.73M2
GLUCOSE BLD-MCNC: 140 MG/DL (ref 70–99)
GLUCOSE BLD-MCNC: 156 MG/DL (ref 70–99)
GLUCOSE BLD-MCNC: 158 MG/DL (ref 70–99)
GLUCOSE BLD-MCNC: 162 MG/DL (ref 70–99)
GLUCOSE BLD-MCNC: 169 MG/DL (ref 70–99)
GLUCOSE, URINE: >500 MG/DL
HCT VFR BLD CALC: 41.8 % (ref 37–47)
HEMOGLOBIN: 14.9 GM/DL (ref 12.5–16)
INR BLD: 1.07 INDEX
KETONES, URINE: ABNORMAL MG/DL
LEUKOCYTE ESTERASE, URINE: NEGATIVE
MAGNESIUM: 2.2 MG/DL (ref 1.8–2.4)
MCH RBC QN AUTO: 34 PG (ref 27–31)
MCHC RBC AUTO-ENTMCNC: 35.6 % (ref 32–36)
MCV RBC AUTO: 95.4 FL (ref 78–100)
NITRITE URINE, QUANTITATIVE: NEGATIVE
PDW BLD-RTO: 13.2 % (ref 11.7–14.9)
PH, URINE: 5 (ref 5–8)
PLATELET # BLD: 170 K/CU MM (ref 140–440)
PMV BLD AUTO: 8.8 FL (ref 7.5–11.1)
POTASSIUM SERPL-SCNC: 4 MMOL/L (ref 3.5–5.1)
PROTEIN UA: 30 MG/DL
PROTHROMBIN TIME: 13 SECONDS (ref 11.7–14.5)
RBC # BLD: 4.38 M/CU MM (ref 4.2–5.4)
RBC URINE: 99 /HPF (ref 0–6)
SARS-COV-2, NAAT: NOT DETECTED
SODIUM BLD-SCNC: 132 MMOL/L (ref 135–145)
SPECIFIC GRAVITY UA: 1.03 (ref 1–1.03)
TRICHOMONAS: ABNORMAL /HPF
UROBILINOGEN, URINE: NEGATIVE MG/DL (ref 0.2–1)
WBC # BLD: 9.8 K/CU MM (ref 4–10.5)
WBC UA: 3 /HPF (ref 0–5)

## 2021-03-15 PROCEDURE — 85027 COMPLETE CBC AUTOMATED: CPT

## 2021-03-15 PROCEDURE — 93010 ELECTROCARDIOGRAM REPORT: CPT | Performed by: INTERNAL MEDICINE

## 2021-03-15 PROCEDURE — 6370000000 HC RX 637 (ALT 250 FOR IP): Performed by: NURSE PRACTITIONER

## 2021-03-15 PROCEDURE — 83735 ASSAY OF MAGNESIUM: CPT

## 2021-03-15 PROCEDURE — 2580000003 HC RX 258: Performed by: NURSE PRACTITIONER

## 2021-03-15 PROCEDURE — 1200000000 HC SEMI PRIVATE

## 2021-03-15 PROCEDURE — 6360000002 HC RX W HCPCS: Performed by: NURSE PRACTITIONER

## 2021-03-15 PROCEDURE — 85610 PROTHROMBIN TIME: CPT

## 2021-03-15 PROCEDURE — 87635 SARS-COV-2 COVID-19 AMP PRB: CPT

## 2021-03-15 PROCEDURE — 82962 GLUCOSE BLOOD TEST: CPT

## 2021-03-15 PROCEDURE — 86901 BLOOD TYPING SEROLOGIC RH(D): CPT

## 2021-03-15 PROCEDURE — 81001 URINALYSIS AUTO W/SCOPE: CPT

## 2021-03-15 PROCEDURE — 93005 ELECTROCARDIOGRAM TRACING: CPT | Performed by: NURSE PRACTITIONER

## 2021-03-15 PROCEDURE — 94761 N-INVAS EAR/PLS OXIMETRY MLT: CPT

## 2021-03-15 PROCEDURE — 80048 BASIC METABOLIC PNL TOTAL CA: CPT

## 2021-03-15 PROCEDURE — 6370000000 HC RX 637 (ALT 250 FOR IP): Performed by: HOSPITALIST

## 2021-03-15 PROCEDURE — 86900 BLOOD TYPING SEROLOGIC ABO: CPT

## 2021-03-15 PROCEDURE — 6360000002 HC RX W HCPCS: Performed by: HOSPITALIST

## 2021-03-15 PROCEDURE — 86850 RBC ANTIBODY SCREEN: CPT

## 2021-03-15 RX ORDER — MORPHINE SULFATE 2 MG/ML
2 INJECTION, SOLUTION INTRAMUSCULAR; INTRAVENOUS
Status: DISCONTINUED | OUTPATIENT
Start: 2021-03-15 | End: 2021-03-18 | Stop reason: HOSPADM

## 2021-03-15 RX ADMIN — HYDROCODONE BITARTRATE AND ACETAMINOPHEN 1 TABLET: 5; 325 TABLET ORAL at 09:08

## 2021-03-15 RX ADMIN — VALSARTAN AND HYDROCHLOROTHIAZIDE 1 TABLET: 160; 12.5 TABLET, FILM COATED ORAL at 07:38

## 2021-03-15 RX ADMIN — BISACODYL 5 MG: 5 TABLET, COATED ORAL at 09:08

## 2021-03-15 RX ADMIN — MULTIPLE VITAMINS W/ MINERALS TAB 1 TABLET: TAB at 07:30

## 2021-03-15 RX ADMIN — LACTULOSE 20 G: 10 SOLUTION ORAL at 20:13

## 2021-03-15 RX ADMIN — ATORVASTATIN CALCIUM 5 MG: 10 TABLET, FILM COATED ORAL at 20:13

## 2021-03-15 RX ADMIN — HYDROCODONE BITARTRATE AND ACETAMINOPHEN 1 TABLET: 5; 325 TABLET ORAL at 02:45

## 2021-03-15 RX ADMIN — HYDROCODONE BITARTRATE AND ACETAMINOPHEN 1 TABLET: 5; 325 TABLET ORAL at 15:28

## 2021-03-15 RX ADMIN — INSULIN LISPRO 1 UNITS: 100 INJECTION, SOLUTION INTRAVENOUS; SUBCUTANEOUS at 09:14

## 2021-03-15 RX ADMIN — METHOCARBAMOL TABLETS 500 MG: 500 TABLET, COATED ORAL at 07:30

## 2021-03-15 RX ADMIN — TRAMADOL HYDROCHLORIDE 25 MG: 50 TABLET, FILM COATED ORAL at 18:43

## 2021-03-15 RX ADMIN — GABAPENTIN 400 MG: 400 CAPSULE ORAL at 20:13

## 2021-03-15 RX ADMIN — SODIUM CHLORIDE, PRESERVATIVE FREE 10 ML: 5 INJECTION INTRAVENOUS at 07:31

## 2021-03-15 RX ADMIN — TRAMADOL HYDROCHLORIDE 25 MG: 50 TABLET, FILM COATED ORAL at 04:44

## 2021-03-15 RX ADMIN — LACTULOSE 20 G: 10 SOLUTION ORAL at 07:31

## 2021-03-15 RX ADMIN — SODIUM CHLORIDE, PRESERVATIVE FREE 10 ML: 5 INJECTION INTRAVENOUS at 20:12

## 2021-03-15 RX ADMIN — INSULIN GLARGINE 40 UNITS: 100 INJECTION, SOLUTION SUBCUTANEOUS at 09:13

## 2021-03-15 RX ADMIN — TRAMADOL HYDROCHLORIDE 25 MG: 50 TABLET, FILM COATED ORAL at 11:09

## 2021-03-15 RX ADMIN — MORPHINE SULFATE 2 MG: 2 INJECTION, SOLUTION INTRAMUSCULAR; INTRAVENOUS at 17:38

## 2021-03-15 RX ADMIN — POLYETHYLENE GLYCOL (3350) 17 G: 17 POWDER, FOR SOLUTION ORAL at 07:31

## 2021-03-15 RX ADMIN — LACTULOSE 20 G: 10 SOLUTION ORAL at 13:05

## 2021-03-15 RX ADMIN — METHOCARBAMOL TABLETS 500 MG: 500 TABLET, COATED ORAL at 20:13

## 2021-03-15 RX ADMIN — PANTOPRAZOLE SODIUM 40 MG: 40 TABLET, DELAYED RELEASE ORAL at 05:44

## 2021-03-15 RX ADMIN — Medication 500 MG: at 07:30

## 2021-03-15 RX ADMIN — INSULIN LISPRO 1 UNITS: 100 INJECTION, SOLUTION INTRAVENOUS; SUBCUTANEOUS at 13:00

## 2021-03-15 RX ADMIN — ROPINIROLE HYDROCHLORIDE 2 MG: 1 TABLET, FILM COATED ORAL at 20:13

## 2021-03-15 RX ADMIN — ENOXAPARIN SODIUM 30 MG: 30 INJECTION SUBCUTANEOUS at 07:31

## 2021-03-15 RX ADMIN — MORPHINE SULFATE 2 MG: 2 INJECTION, SOLUTION INTRAMUSCULAR; INTRAVENOUS at 21:17

## 2021-03-15 RX ADMIN — ROPINIROLE HYDROCHLORIDE 2 MG: 1 TABLET, FILM COATED ORAL at 13:05

## 2021-03-15 RX ADMIN — POLYVINYL ALCOHOL 1 DROP: 14 SOLUTION/ DROPS OPHTHALMIC at 20:12

## 2021-03-15 RX ADMIN — ROPINIROLE HYDROCHLORIDE 2 MG: 1 TABLET, FILM COATED ORAL at 07:30

## 2021-03-15 RX ADMIN — GABAPENTIN 400 MG: 400 CAPSULE ORAL at 07:30

## 2021-03-15 RX ADMIN — GABAPENTIN 400 MG: 400 CAPSULE ORAL at 13:05

## 2021-03-15 RX ADMIN — INSULIN LISPRO 1 UNITS: 100 INJECTION, SOLUTION INTRAVENOUS; SUBCUTANEOUS at 17:38

## 2021-03-15 RX ADMIN — METHOCARBAMOL TABLETS 500 MG: 500 TABLET, COATED ORAL at 13:05

## 2021-03-15 ASSESSMENT — PAIN DESCRIPTION - LOCATION
LOCATION: LEG

## 2021-03-15 ASSESSMENT — PAIN DESCRIPTION - PROGRESSION
CLINICAL_PROGRESSION: GRADUALLY WORSENING
CLINICAL_PROGRESSION: GRADUALLY WORSENING
CLINICAL_PROGRESSION: GRADUALLY IMPROVING
CLINICAL_PROGRESSION: NOT CHANGED
CLINICAL_PROGRESSION: GRADUALLY IMPROVING
CLINICAL_PROGRESSION: GRADUALLY WORSENING

## 2021-03-15 ASSESSMENT — PAIN DESCRIPTION - PAIN TYPE
TYPE: ACUTE PAIN;CHRONIC PAIN
TYPE: ACUTE PAIN;CHRONIC PAIN

## 2021-03-15 ASSESSMENT — PAIN SCALES - GENERAL
PAINLEVEL_OUTOF10: 7
PAINLEVEL_OUTOF10: 9
PAINLEVEL_OUTOF10: 6
PAINLEVEL_OUTOF10: 6
PAINLEVEL_OUTOF10: 8
PAINLEVEL_OUTOF10: 7
PAINLEVEL_OUTOF10: 4

## 2021-03-15 ASSESSMENT — PAIN DESCRIPTION - FREQUENCY
FREQUENCY: CONTINUOUS
FREQUENCY: CONTINUOUS

## 2021-03-15 ASSESSMENT — PAIN DESCRIPTION - ONSET
ONSET: ON-GOING

## 2021-03-15 ASSESSMENT — PAIN DESCRIPTION - DESCRIPTORS
DESCRIPTORS: ACHING;SPASM
DESCRIPTORS: SPASM
DESCRIPTORS: SPASM

## 2021-03-15 ASSESSMENT — PAIN DESCRIPTION - ORIENTATION
ORIENTATION: RIGHT
ORIENTATION: RIGHT

## 2021-03-15 NOTE — PROGRESS NOTES
77 y.o. female with back pain radiating to the right anterior thigh consistent with L3 radiculopathy. Subjective:  No change in right lower extremity symptoms overnight. Objective:  Vitals:    03/14/21 1621 03/14/21 2045 03/15/21 0514 03/15/21 0545   BP: (!) 153/68 (!) 160/70  129/64   Pulse: 97 112  84   Resp: 16 15  12   Temp: 98.6 °F (37 °C) 97.9 °F (36.6 °C)  97.8 °F (36.6 °C)   TempSrc: Oral Oral  Oral   SpO2: 96% 95% 95% 95%   Weight:    149 lb 9.6 oz (67.9 kg)   Height:          Output by Drain (mL) 03/13/21 0701 - 03/13/21 1500 03/13/21 1501 - 03/13/21 2300 03/13/21 2301 - 03/14/21 0700 03/14/21 0701 - 03/14/21 1500 03/14/21 1501 - 03/14/21 2300 03/14/21 2301 - 03/15/21 0700 03/15/21 0701 - 03/15/21 0715   Requested LDAs do not have output data documented. Intake/Output Summary (Last 24 hours) at 3/15/2021 0715  Last data filed at 3/15/2021 0657  Gross per 24 hour   Intake 10 ml   Output 850 ml   Net -840 ml       Neurological Exam:  Awake, alert. No acute distress. Motor 5/5 bilateral upper and lower extremities, except  4/5 right quadriceps. Sensation intact to light touch. Deep tendon reflexes 1+ bilateral upper and lower extremities. Gait not evaluated. Lab Results   Component Value Date    WBC 6.8 03/12/2021    HGB 14.9 03/12/2021    HCT 43.1 03/12/2021    MCV 98.4 03/12/2021     03/12/2021       Lab Results   Component Value Date     03/12/2021    K 3.9 03/12/2021    CL 98 03/12/2021    CO2 28 03/12/2021    BUN 21 03/12/2021    CREATININE 0.9 03/12/2021    GLUCOSE 124 03/12/2021    CALCIUM 9.6 03/12/2021        Lab Results   Component Value Date    INR 1.10 08/17/2015    PROTIME 12.5 08/17/2015       Lab Results   Component Value Date    APTT 31.8 08/17/2015       Imaging:  I have personally reviewed the images and radiology report from lumbar MRI done on 3/12/2021 showing multilevel degenerative disc disease and facet arthropathy.   The most significant finding is severe L3-4 spinal stenosis with facet arthropathy and synovitis, and severe right L3-4 foraminal stenosis. The latter finding is likely the cause of her symptoms. The radiology report indicates severe stenosis at L1-2 as well, and moderate spinal stenosis at T11-12, T12-L1, L2-3 and L4-5. In my opinion, the degree of stenosis at L1-2 is not as severe as at L3-4. Furthermore, her symptoms do correlate with an L3 radiculopathy. Impression:  Right L3 radiculopathy. Neurogenic claudication. Severe spinal stenosis at L3-4. Severe right L3-4 foraminal stenosis. Recommendations:   Minimally invasive left L3-4 anterior to psoas discectomy and arthrodesis, titanium interbody device, morselized synthetic allograft, posterior L3-4 instrumentation and fusion, intraoperative fluoroscopy and neurophysiologic monitoring.  Surgery is planned for tomorrow.  Informed consent was obtained already yesterday.  The patient does not have any questions today. Raven Montano MD, [address], [facility]  Board Certified Neurosurgeon  Minimally Invasive Spine Surgeon  President of [address] [place] [address]..    Phone: Gerardo Agus ([phone])  Fax: [phone]  Website: [email or web address]

## 2021-03-15 NOTE — PROGRESS NOTES
Patient noted with liquid yellow, large amount of emesis with whole pills noted after taking AM meds.

## 2021-03-15 NOTE — ANESTHESIA PRE PROCEDURE
taking differently: Take 400 mg by mouth 3 times daily. 5/29/18 3/12/21 Yes Lizeth Bustamante MD   alendronate (FOSAMAX) 70 MG tablet Take 1 tablet by mouth every 7 days  Patient taking differently: Take 70 mg by mouth every 7 days Takes on Fridays 5/29/18  Yes Lizeth Bustamante MD   rOPINIRole (REQUIP) 2 MG tablet Take 1 tablet by mouth 2 times daily  Patient taking differently: Take 2 mg by mouth See Admin Instructions Takes at dinner and HS 5/29/18  Yes Lizeth Bustamante MD   cycloSPORINE (RESTASIS) 0.05 % ophthalmic emulsion Place 1 drop into both eyes 2 times daily    Yes Historical Provider, MD   calcium carbonate (OSCAL) 500 MG TABS tablet Take 1 tablet by mouth daily 5/12/16  Yes Juan Carlos Segura PA-C   Insulin Pen Needle (PEN NEEDLES) 31G X 8 MM MISC 1 pen by Does not apply route 2 times daily 4/4/19   Lizeth Bustamante MD   Glucose Blood (BLOOD GLUCOSE TEST STRIPS) STRP Check BS 4x a day 6/5/18   Lizeth Bustamante MD   ONE TOUCH ULTRA TEST strip TO CHECK BLOOD SUGAR 4X DAILY WITH CURRENT GLUCOMETER: DX: DIABETES TYPE II E11.9 6/4/18   Lizeth Bustamante MD   acetaminophen (TYLENOL) 500 MG tablet Take 500 mg by mouth every 6 hours as needed for Pain    Historical Provider, MD   fluticasone (VERAMYST) 27.5 MCG/SPRAY nasal spray 2 sprays by Nasal route daily    Historical Provider, MD   Glucose Blood (BLOOD GLUCOSE TEST STRIPS) STRP To check blood sugar 4x daily with current Glucometer:   DX: diabetes type .00 5/29/18   Lizeth Bustamante MD   pimecrolimus (ELIDEL) 1 % cream Apply topically 2 times daily Apply topically 2 times daily.  11/30/17   Lizeth Bustamante MD   Blood Glucose Monitoring Suppl DOMNIIQUE One device 5/26/17   Lizeth Bustamante MD   Lancets MISC Use 1 lancet 3 times daily 5/26/17   Lizeth Bustamante MD       Current medications:    Current Facility-Administered Medications   Medication Dose Route Frequency Provider Last Rate Last Admin    [START ON 3/16/2021] ceFAZolin (ANCEF) 2000 mg in dextrose 5 % 100 mL IVPB  2,000 mg Intravenous Once Breana Pierce MD        morphine (PF) injection 2 mg  2 mg Intravenous Q3H PRN Vikas Garcia MD        lactulose (CHRONULAC) 10 GM/15ML solution 20 g  20 g Oral TID Vikas Garcia MD   20 g at 03/15/21 0731    sennosides-docusate sodium (SENOKOT-S) 8.6-50 MG tablet 2 tablet  2 tablet Oral Daily PRN Vikas Garcia MD   2 tablet at 03/14/21 2049    rOPINIRole (REQUIP) tablet 2 mg  2 mg Oral TID Vikas Garcia MD   2 mg at 03/15/21 0730    polyethylene glycol (GLYCOLAX) packet 17 g  17 g Oral Daily Vikas Garcia MD   17 g at 03/15/21 0731    sodium chloride flush 0.9 % injection 10 mL  10 mL Intravenous 2 times per day RORO Ruiz - CNP   10 mL at 03/15/21 0731    sodium chloride flush 0.9 % injection 10 mL  10 mL Intravenous PRN RORO Ruiz CNP        enoxaparin (LOVENOX) injection 30 mg  30 mg Subcutaneous Daily RORO Ruiz - CNP   30 mg at 03/15/21 0731    promethazine (PHENERGAN) tablet 12.5 mg  12.5 mg Oral Q6H PRN RORO Ruiz CNP        Or    ondansetron (ZOFRAN) injection 4 mg  4 mg Intravenous Q6H PRN RORO Ruiz - CNP        acetaminophen (TYLENOL) tablet 650 mg  650 mg Oral Q6H PRN RORO Ruiz - CNP        Or    acetaminophen (TYLENOL) suppository 650 mg  650 mg Rectal Q6H PRN RORO Ruiz - CNP        traMADol (ULTRAM) tablet 25 mg  25 mg Oral Q6H PRN Beth Lynn APRN - CNP   25 mg at 03/15/21 1109    acetaminophen (TYLENOL) tablet 500 mg  500 mg Oral Q6H PRN RORO Ruiz - CNP        valsartan-hydroCHLOROthiazide (DIOVAN-HCT) 160-12.5 MG per tablet 1 tablet  1 tablet Oral Daily RORO Ruiz - CNP   1 tablet at 03/15/21 0738    atorvastatin (LIPITOR) tablet 5 mg  5 mg Oral Nightly RORO Ruiz CNP   5 mg at 03/14/21 2050    pimecrolimus (ELIDEL) 1 % cream  ++Non Formulary / Patient Supplied++   Topical BID RORO Ruiz CNP        pantoprazole (PROTONIX) tablet 40 mg  40 mg Oral QAM AC Wilson Healthe Ring, APRN - CNP   40 mg at 03/15/21 0544    therapeutic multivitamin-minerals 1 tablet  1 tablet Oral Daily Wilson Healthe Ring, APRN - CNP   1 tablet at 03/15/21 0730    methocarbamol (ROBAXIN) tablet 500 mg  500 mg Oral 4x Daily PRN Ellaree Ring, APRN - CNP   500 mg at 03/15/21 0730    insulin glargine (LANTUS) injection vial 40 Units  40 Units Subcutaneous Daily Wilson Healthe Ring, APRN - CNP   40 Units at 03/15/21 0913    HYDROcodone-acetaminophen (NORCO) 5-325 MG per tablet 1 tablet  1 tablet Oral Q6H PRN Wilson Healthe Ring, APRN - CNP   1 tablet at 03/15/21 0908    gabapentin (NEURONTIN) capsule 400 mg  400 mg Oral TID Wilson Healthe Ring, APRN - CNP   400 mg at 03/15/21 0730    insulin lispro (HUMALOG) injection vial 0-6 Units  0-6 Units Subcutaneous TID WC Wilson Healthe Ring, APRN - CNP   1 Units at 03/15/21 0914    insulin lispro (HUMALOG) injection vial 0-3 Units  0-3 Units Subcutaneous Nightly Wilson Healthe Ring, APRN - CNP   1 Units at 03/14/21 2056    glucose (GLUTOSE) 40 % oral gel 15 g  15 g Oral PRN Ananya Malcolm, APRN - CNP        dextrose 50 % IV solution  12.5 g Intravenous PRN Ananya Malcolm, APRN - CNP        glucagon (rDNA) injection 1 mg  1 mg Intramuscular PRN Ananya Malcolm, APRN - CNP        dextrose 5 % solution  100 mL/hr Intravenous PRN Wilson Healthe Ring, APRN - CNP        calcium elemental (OSCAL) tablet 500 mg  500 mg Oral Daily with breakfast Wilson Healthe Ring, APRN - CNP   500 mg at 03/15/21 0730    polyvinyl alcohol (LIQUIFILM TEARS) 1.4 % ophthalmic solution 1 drop  1 drop Both Eyes BID Ananya Malcolm, APRN - CNP   1 drop at 03/14/21 1116    lidocaine 4 % external patch 1 patch  1 patch Transdermal Daily Wilson Healthe Ring, APRN - CNP   1 patch at 03/15/21 0731     Facility-Administered Medications Ordered in Other Encounters   Medication Dose Route Frequency Provider Last Rate Last Admin    sodium chloride flush 0.9 % injection 10 mL  10 mL Intravenous BID Jed Pamela Goldman MD           Allergies: Allergies   Allergen Reactions    Latex     Triamterene        Problem List:    Patient Active Problem List   Diagnosis Code    Restless leg syndrome G25.81    Hypertension I10    Diabetes mellitus with neuropathy (HCC) E11.40    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    Osteopenia M85.80    Gastroesophageal reflux disease without esophagitis K21.9    Insomnia G47.00    Osteoporosis M81.0    Intractable back pain M54.9    Hyponatremia E87.1       Past Medical History:        Diagnosis Date    Diabetes mellitus with neuropathy ([facility]     GERD (gastroesophageal reflux disease)     Hypertension     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    Dr Dalia Benz; Donzetta Lull macroglobulinemia    RLS (restless legs syndrome)     Uterine cancer ([facility]     AHMET, Radium implant; no recurrence       Past Surgical History:        Procedure Laterality Date    HAND SURGERY  2016    HYSTERECTOMY      TUBAL LIGATION         Social History:    Social History     Tobacco Use    Smoking status: Former Smoker     Packs/day: 1.00     Years: 31.00     Pack years: 31.00     Types: Cigarettes     Start date: 1965     Quit date: 1996     Years since quittin.1    Smokeless tobacco: Never Used   Substance Use Topics    Alcohol use: No     Alcohol/week: 0.0 standard drinks                                Counseling given: Not Answered      Vital Signs (Current):   Vitals:    03/15/21 0514 03/15/21 0545 03/15/21 0738 03/15/21 1214   BP:  129/64 (!) 142/65    Pulse:  84 85    Resp:  12 16    Temp:  36.6 °C (97.8 °F) 36.8 °C (98.2 °F)    TempSrc:  Oral Oral    SpO2: 95% 95% 96% 96%   Weight:  149 lb 9.6 oz (67.9 kg)     Height:                                                  BP Readings from Last 3 Encounters:   03/15/21 (!) 142/65   03/10/21 134/73   20 127/73       NPO Status:                                       12 hrs. 9/2015:  IMPRESSION:   1. Mild left ventricular hypertrophy noted. 2.  Left ventricular function is preserved with ejection fraction 50% to 55%. 3.  Grade 2 diastolic dysfunction present. 4.  Mild mitral and tricuspid regurgitation present. 5.  Intra-atrial septum is slightly aneurysmal.     Neuro/Psych:   Negative Neuro/Psych ROS              GI/Hepatic/Renal:   (+) GERD:,           Endo/Other:    (+) Diabetes, electrolyte abnormalities, malignancy/cancer. ROS comment: Malignant lymphoplasmacytic lymphoma  Uterine cancer  AHMET, Radium implant; no recurrence  Abdominal:           Vascular: negative vascular ROS. Anesthesia Plan      general     ASA 3       Induction: intravenous. Anesthetic plan and risks discussed with patient. RORO Estrella CRNA   3/15/2021       Pre Anesthesia Evaluation complete. Anesthesia plan, risks, benefits, alternatives, and personnel discussed with patient and/or legal guardian. Patient and/or legal guardian verbalized an understanding and agreed to proceed. Anesthesia plan discussed with care team members and agreed upon.   RORO Estrella CRNA  3/16/2021

## 2021-03-15 NOTE — PROGRESS NOTES
Progress Note  Date:3/15/2021       [facility]:[id number]  Patient Name:Megan Dixon     YOB: 1945     Age:75 y.o. Still has back pain  Subjective    Subjective:  Symptoms:  Stable. Diet:  Poor intake. Pain:  She complains of pain that is moderate. Review of Systems  Objective         Vitals Last 24 Hours:  TEMPERATURE:  Temp  Av.1 °F (36.7 °C)  Min: 97.8 °F (36.6 °C)  Max: 98.6 °F (37 °C)  RESPIRATIONS RANGE: Resp  Avg: 15  Min: 12  Max: 16  PULSE OXIMETRY RANGE: SpO2  Av.5 %  Min: 95 %  Max: 96 %  PULSE RANGE: Pulse  Av.8  Min: 84  Max: 112  BLOOD PRESSURE RANGE: Systolic (62QIE), IEB:527 , Min:129 , ZNK:067   ; Diastolic (97ALR), FLW:81, Min:64, Max:70    I/O (24Hr): Intake/Output Summary (Last 24 hours) at 3/15/2021 0804  Last data filed at 3/15/2021 0730  Gross per 24 hour   Intake 20 ml   Output 850 ml   Net -830 ml     Objective:  General Appearance:  Comfortable. Vital signs: (most recent): Blood pressure (!) 142/65, pulse 85, temperature 98.2 °F (36.8 °C), temperature source Oral, resp. rate 16, height 5' 1\" (1.549 m), weight 149 lb 9.6 oz (67.9 kg), SpO2 96 %, not currently breastfeeding. Vital signs are normal.    HEENT: Normal HEENT exam.    Lungs:  Normal effort. Heart: Normal rate. Abdomen: Abdomen is soft. Extremities: Decreased range of motion. (Decreased movement right leg on flexion of both hip and knee. Pulse and sensation intact)  Neurological: Patient is alert. Pupils:  Pupils are equal, round, and reactive to light. Skin:  Warm.       Labs/Imaging/Diagnostics    Labs:  CBC:  Recent Labs     21  1140   WBC 6.8   RBC 4.38   HGB 14.9   HCT 43.1   MCV 98.4   RDW 13.1        CHEMISTRIES:  Recent Labs     21  1140 03/15/21  0505   * 132*   K 3.9 4.0   CL 98* 95*   CO2 28 16*   BUN 21 22   CREATININE 0.9 0.9   GLUCOSE 124* 156*   MG  --  2.2     PT/INR:No results for input(s): PROTIME, INR in the last 72 hours.  APTT:No results for input(s): APTT in the last 72 hours. LIVER PROFILE:  Recent Labs     03/12/21  1140   AST 25   ALT 19   BILITOT 0.7   ALKPHOS 40       Imaging Last 24 Hours:  Xr Hip Right (2-3 Views)    Result Date: 3/12/2021  EXAMINATION: TWO XRAY VIEWS OF THE RIGHT HIP 3/12/2021 1:52 pm COMPARISON: Pelvis/right hip radiograph performed 03/10/2021. HISTORY: ORDERING SYSTEM PROVIDED HISTORY: trauma TECHNOLOGIST PROVIDED HISTORY: Reason for exam:->trauma Reason for Exam: back pain FINDINGS: No acute osseous abnormality demonstrated. The right hip joint is anatomically aligned without significant degenerative changes. Few scattered surgical clips noted within the pelvis. No focal soft tissue abnormality. No acute abnormality of the pelvis or right hip. Ct Lumbar Spine Wo Contrast    Result Date: 3/12/2021  EXAMINATION: CT OF THE LUMBAR SPINE WITHOUT CONTRAST  3/12/2021 TECHNIQUE: CT of the lumbar spine was performed without the administration of intravenous contrast. Multiplanar reformatted images are provided for review. Dose modulation, iterative reconstruction, and/or weight based adjustment of the mA/kV was utilized to reduce the radiation dose to as low as reasonably achievable. COMPARISON: Lumbar spine radiograph performed March 10, 2021. HISTORY: ORDERING SYSTEM PROVIDED HISTORY: trauma TECHNOLOGIST PROVIDED HISTORY: Reason for exam:->trauma Decision Support Exception->Emergency Medical Condition (MA) Reason for Exam: CHRONIC BACK PAIN /// RT. LEG NUMBNESS Acuity: Chronic Type of Exam: Initial FINDINGS: BONES/ALIGNMENT: There is normal alignment of the spine. The vertebral body heights are maintained. No osseous destructive lesion is seen. DEGENERATIVE CHANGES: There is diffuse osteopenia. Mild to moderate multilevel degenerative changes, grossly unchanged from prior radiograph. Disc bulge at L3-4 contributes to moderate to severe canal narrowing.   L1-2 and L2-3 disc bulge contributes to mild canal narrowing. No severe neural foraminal narrowing. SOFT TISSUES/RETROPERITONEUM: No paraspinal mass is seen. 1. No acute abnormality of the lumbar spine. 2. L3-4 disc bulge contributes to moderate to severe canal narrowing. Additional degenerative changes, as described above. Mri Lumbar Spine Wo Contrast    Result Date: 3/12/2021  EXAMINATION: MRI OF THE LUMBAR SPINE WITHOUT CONTRAST, 3/12/2021 4:59 pm TECHNIQUE: Multiplanar multisequence MRI of the lumbar spine was performed without the administration of intravenous contrast. COMPARISON: CT earlier today. HISTORY: ORDERING SYSTEM PROVIDED HISTORY: back pain, concern for HNP TECHNOLOGIST PROVIDED HISTORY: Reason for exam:->back pain, concern for HNP Reason for Exam: numbness in legs - went to chiropractor and had severe pain unable to walk FINDINGS: BONES/ALIGNMENT: There is normal alignment of the spine. The vertebral body heights are maintained. The bone marrow signal appears unremarkable. SPINAL CORD: The conus terminates normally at the upper L2 level. The visualized spinal cord has normal signal and morphology. No evidence of mass or abnormal fluid collection within the spinal canal. SOFT TISSUES: There is minimal soft tissue edema about the hypertrophic L3-4 facet joints, likely reactive. No fluid collection or mass is evident. T11-12: Mild disc height loss and desiccation. No neural foraminal narrowing. Moderate spinal canal stenosis secondary to a left posterior paracentral disc protrusion and facet and ligamentum flavum hypertrophy. T12-L1: Mild disc height loss and desiccation. No neural foraminal narrowing. Moderate spinal canal stenosis secondary to a left posterior paracentral disc protrusion. L1-L2: Moderate disc height loss and desiccation. Mild bilateral neural foraminal narrowing secondary to disc bulge. Severe spinal canal stenosis secondary to disc bulge and facet and ligamentum flavum hypertrophy.  L2-L3: Mild disc

## 2021-03-16 ENCOUNTER — APPOINTMENT (OUTPATIENT)
Dept: GENERAL RADIOLOGY | Age: 76
DRG: 454 | End: 2021-03-16
Payer: MEDICARE

## 2021-03-16 ENCOUNTER — ANESTHESIA (OUTPATIENT)
Dept: OPERATING ROOM | Age: 76
DRG: 454 | End: 2021-03-16
Payer: MEDICARE

## 2021-03-16 VITALS
DIASTOLIC BLOOD PRESSURE: 57 MMHG | TEMPERATURE: 95.9 F | OXYGEN SATURATION: 99 % | RESPIRATION RATE: 6 BRPM | SYSTOLIC BLOOD PRESSURE: 130 MMHG

## 2021-03-16 LAB
GLUCOSE BLD-MCNC: 110 MG/DL (ref 70–99)
GLUCOSE BLD-MCNC: 120 MG/DL (ref 70–99)
GLUCOSE BLD-MCNC: 123 MG/DL (ref 70–99)
GLUCOSE BLD-MCNC: 149 MG/DL (ref 70–99)
GLUCOSE BLD-MCNC: 239 MG/DL (ref 70–99)

## 2021-03-16 PROCEDURE — 6370000000 HC RX 637 (ALT 250 FOR IP): Performed by: NEUROLOGICAL SURGERY

## 2021-03-16 PROCEDURE — 6370000000 HC RX 637 (ALT 250 FOR IP): Performed by: HOSPITALIST

## 2021-03-16 PROCEDURE — 2580000003 HC RX 258: Performed by: ANESTHESIOLOGY

## 2021-03-16 PROCEDURE — 6360000002 HC RX W HCPCS: Performed by: ANESTHESIOLOGY

## 2021-03-16 PROCEDURE — 7100000000 HC PACU RECOVERY - FIRST 15 MIN: Performed by: NEUROLOGICAL SURGERY

## 2021-03-16 PROCEDURE — 6360000002 HC RX W HCPCS: Performed by: NEUROLOGICAL SURGERY

## 2021-03-16 PROCEDURE — 6370000000 HC RX 637 (ALT 250 FOR IP): Performed by: NURSE PRACTITIONER

## 2021-03-16 PROCEDURE — 3700000001 HC ADD 15 MINUTES (ANESTHESIA): Performed by: NEUROLOGICAL SURGERY

## 2021-03-16 PROCEDURE — C1713 ANCHOR/SCREW BN/BN,TIS/BN: HCPCS | Performed by: NEUROLOGICAL SURGERY

## 2021-03-16 PROCEDURE — 2580000003 HC RX 258: Performed by: NURSE ANESTHETIST, CERTIFIED REGISTERED

## 2021-03-16 PROCEDURE — 7100000001 HC PACU RECOVERY - ADDTL 15 MIN: Performed by: NEUROLOGICAL SURGERY

## 2021-03-16 PROCEDURE — 6370000000 HC RX 637 (ALT 250 FOR IP)

## 2021-03-16 PROCEDURE — 1200000000 HC SEMI PRIVATE

## 2021-03-16 PROCEDURE — 2580000003 HC RX 258: Performed by: NURSE PRACTITIONER

## 2021-03-16 PROCEDURE — 6360000002 HC RX W HCPCS: Performed by: NURSE ANESTHETIST, CERTIFIED REGISTERED

## 2021-03-16 PROCEDURE — C9359 IMPLNT,BON VOID FILLER-PUTTY: HCPCS | Performed by: NEUROLOGICAL SURGERY

## 2021-03-16 PROCEDURE — 2500000003 HC RX 250 WO HCPCS: Performed by: NURSE ANESTHETIST, CERTIFIED REGISTERED

## 2021-03-16 PROCEDURE — 2580000003 HC RX 258: Performed by: NEUROLOGICAL SURGERY

## 2021-03-16 PROCEDURE — 2720000010 HC SURG SUPPLY STERILE: Performed by: NEUROLOGICAL SURGERY

## 2021-03-16 PROCEDURE — 2780000010 HC IMPLANT OTHER: Performed by: NEUROLOGICAL SURGERY

## 2021-03-16 PROCEDURE — 3600000004 HC SURGERY LEVEL 4 BASE: Performed by: NEUROLOGICAL SURGERY

## 2021-03-16 PROCEDURE — C1821 INTERSPINOUS IMPLANT: HCPCS | Performed by: NEUROLOGICAL SURGERY

## 2021-03-16 PROCEDURE — 6360000002 HC RX W HCPCS: Performed by: HOSPITALIST

## 2021-03-16 PROCEDURE — 3600000014 HC SURGERY LEVEL 4 ADDTL 15MIN: Performed by: NEUROLOGICAL SURGERY

## 2021-03-16 PROCEDURE — 3700000000 HC ANESTHESIA ATTENDED CARE: Performed by: NEUROLOGICAL SURGERY

## 2021-03-16 PROCEDURE — 2500000003 HC RX 250 WO HCPCS: Performed by: NEUROLOGICAL SURGERY

## 2021-03-16 PROCEDURE — 0SG00K1 FUSION OF LUMBAR VERTEBRAL JOINT WITH NONAUTOLOGOUS TISSUE SUBSTITUTE, POSTERIOR APPROACH, POSTERIOR COLUMN, OPEN APPROACH: ICD-10-PCS | Performed by: NEUROLOGICAL SURGERY

## 2021-03-16 PROCEDURE — 0SB20ZZ EXCISION OF LUMBAR VERTEBRAL DISC, OPEN APPROACH: ICD-10-PCS | Performed by: NEUROLOGICAL SURGERY

## 2021-03-16 PROCEDURE — 76000 FLUOROSCOPY <1 HR PHYS/QHP: CPT

## 2021-03-16 PROCEDURE — 82962 GLUCOSE BLOOD TEST: CPT

## 2021-03-16 PROCEDURE — 2709999900 HC NON-CHARGEABLE SUPPLY: Performed by: NEUROLOGICAL SURGERY

## 2021-03-16 PROCEDURE — 0SG00A0 FUSION OF LUMBAR VERTEBRAL JOINT WITH INTERBODY FUSION DEVICE, ANTERIOR APPROACH, ANTERIOR COLUMN, OPEN APPROACH: ICD-10-PCS | Performed by: NEUROLOGICAL SURGERY

## 2021-03-16 PROCEDURE — C9290 INJ, BUPIVACAINE LIPOSOME: HCPCS | Performed by: NEUROLOGICAL SURGERY

## 2021-03-16 DEVICE — IMPLANTABLE DEVICE: Type: IMPLANTABLE DEVICE | Site: SPINE LUMBAR | Status: FUNCTIONAL

## 2021-03-16 DEVICE — BONE GRFT SUB L 12.5CC BIOACTIVE GLS MTRX: Type: IMPLANTABLE DEVICE | Site: SPINE LUMBAR | Status: FUNCTIONAL

## 2021-03-16 DEVICE — Z DUP USE 2254824 SCREW SET SINGLE INNIE 2 MINIMALLY INVASIVE SURG TI VIPER 2: Type: IMPLANTABLE DEVICE | Site: SPINE LUMBAR | Status: FUNCTIONAL

## 2021-03-16 DEVICE — ROD SPNL L45MM POST PEDCL TI LORDOSED VIPER 2: Type: IMPLANTABLE DEVICE | Site: SPINE LUMBAR | Status: FUNCTIONAL

## 2021-03-16 RX ORDER — MORPHINE SULFATE 2 MG/ML
2 INJECTION, SOLUTION INTRAMUSCULAR; INTRAVENOUS EVERY 5 MIN PRN
Status: DISCONTINUED | OUTPATIENT
Start: 2021-03-16 | End: 2021-03-16 | Stop reason: HOSPADM

## 2021-03-16 RX ORDER — ROCURONIUM BROMIDE 10 MG/ML
INJECTION, SOLUTION INTRAVENOUS PRN
Status: DISCONTINUED | OUTPATIENT
Start: 2021-03-16 | End: 2021-03-16 | Stop reason: SDUPTHER

## 2021-03-16 RX ORDER — DEXAMETHASONE SODIUM PHOSPHATE 4 MG/ML
INJECTION, SOLUTION INTRA-ARTICULAR; INTRALESIONAL; INTRAMUSCULAR; INTRAVENOUS; SOFT TISSUE PRN
Status: DISCONTINUED | OUTPATIENT
Start: 2021-03-16 | End: 2021-03-16 | Stop reason: SDUPTHER

## 2021-03-16 RX ORDER — HYDROMORPHONE HCL 110MG/55ML
PATIENT CONTROLLED ANALGESIA SYRINGE INTRAVENOUS PRN
Status: DISCONTINUED | OUTPATIENT
Start: 2021-03-16 | End: 2021-03-16 | Stop reason: SDUPTHER

## 2021-03-16 RX ORDER — HYDROMORPHONE HCL 110MG/55ML
0.25 PATIENT CONTROLLED ANALGESIA SYRINGE INTRAVENOUS EVERY 5 MIN PRN
Status: DISCONTINUED | OUTPATIENT
Start: 2021-03-16 | End: 2021-03-16 | Stop reason: HOSPADM

## 2021-03-16 RX ORDER — FENTANYL CITRATE 50 UG/ML
25 INJECTION, SOLUTION INTRAMUSCULAR; INTRAVENOUS EVERY 5 MIN PRN
Status: DISCONTINUED | OUTPATIENT
Start: 2021-03-16 | End: 2021-03-16 | Stop reason: HOSPADM

## 2021-03-16 RX ORDER — HYDRALAZINE HYDROCHLORIDE 20 MG/ML
5 INJECTION INTRAMUSCULAR; INTRAVENOUS EVERY 10 MIN PRN
Status: DISCONTINUED | OUTPATIENT
Start: 2021-03-16 | End: 2021-03-16 | Stop reason: HOSPADM

## 2021-03-16 RX ORDER — LIDOCAINE HYDROCHLORIDE 20 MG/ML
INJECTION, SOLUTION INTRAVENOUS PRN
Status: DISCONTINUED | OUTPATIENT
Start: 2021-03-16 | End: 2021-03-16 | Stop reason: SDUPTHER

## 2021-03-16 RX ORDER — SODIUM CHLORIDE, SODIUM LACTATE, POTASSIUM CHLORIDE, CALCIUM CHLORIDE 600; 310; 30; 20 MG/100ML; MG/100ML; MG/100ML; MG/100ML
INJECTION, SOLUTION INTRAVENOUS CONTINUOUS PRN
Status: DISCONTINUED | OUTPATIENT
Start: 2021-03-16 | End: 2021-03-16 | Stop reason: SDUPTHER

## 2021-03-16 RX ORDER — OXYCODONE HYDROCHLORIDE AND ACETAMINOPHEN 5; 325 MG/1; MG/1
1 TABLET ORAL ONCE
Status: DISCONTINUED | OUTPATIENT
Start: 2021-03-16 | End: 2021-03-18 | Stop reason: HOSPADM

## 2021-03-16 RX ORDER — METHOCARBAMOL 750 MG/1
750 TABLET, FILM COATED ORAL 3 TIMES DAILY
Status: DISCONTINUED | OUTPATIENT
Start: 2021-03-16 | End: 2021-03-18 | Stop reason: HOSPADM

## 2021-03-16 RX ORDER — LABETALOL HYDROCHLORIDE 5 MG/ML
5 INJECTION, SOLUTION INTRAVENOUS EVERY 10 MIN PRN
Status: DISCONTINUED | OUTPATIENT
Start: 2021-03-16 | End: 2021-03-16 | Stop reason: HOSPADM

## 2021-03-16 RX ORDER — HYDROMORPHONE HCL 110MG/55ML
0.5 PATIENT CONTROLLED ANALGESIA SYRINGE INTRAVENOUS EVERY 5 MIN PRN
Status: DISCONTINUED | OUTPATIENT
Start: 2021-03-16 | End: 2021-03-16 | Stop reason: HOSPADM

## 2021-03-16 RX ORDER — SODIUM CHLORIDE, SODIUM LACTATE, POTASSIUM CHLORIDE, CALCIUM CHLORIDE 600; 310; 30; 20 MG/100ML; MG/100ML; MG/100ML; MG/100ML
INJECTION, SOLUTION INTRAVENOUS CONTINUOUS
Status: DISCONTINUED | OUTPATIENT
Start: 2021-03-16 | End: 2021-03-18

## 2021-03-16 RX ORDER — ONDANSETRON 2 MG/ML
INJECTION INTRAMUSCULAR; INTRAVENOUS PRN
Status: DISCONTINUED | OUTPATIENT
Start: 2021-03-16 | End: 2021-03-16 | Stop reason: SDUPTHER

## 2021-03-16 RX ORDER — KETOROLAC TROMETHAMINE 30 MG/ML
15 INJECTION, SOLUTION INTRAMUSCULAR; INTRAVENOUS 3 TIMES DAILY
Status: DISPENSED | OUTPATIENT
Start: 2021-03-16 | End: 2021-03-17

## 2021-03-16 RX ORDER — PROPOFOL 10 MG/ML
INJECTION, EMULSION INTRAVENOUS PRN
Status: DISCONTINUED | OUTPATIENT
Start: 2021-03-16 | End: 2021-03-16 | Stop reason: SDUPTHER

## 2021-03-16 RX ORDER — FENTANYL CITRATE 50 UG/ML
INJECTION, SOLUTION INTRAMUSCULAR; INTRAVENOUS PRN
Status: DISCONTINUED | OUTPATIENT
Start: 2021-03-16 | End: 2021-03-16 | Stop reason: SDUPTHER

## 2021-03-16 RX ORDER — ONDANSETRON 2 MG/ML
4 INJECTION INTRAMUSCULAR; INTRAVENOUS
Status: DISCONTINUED | OUTPATIENT
Start: 2021-03-16 | End: 2021-03-16 | Stop reason: HOSPADM

## 2021-03-16 RX ORDER — PHENYLEPHRINE HYDROCHLORIDE 10 MG/ML
INJECTION INTRAVENOUS PRN
Status: DISCONTINUED | OUTPATIENT
Start: 2021-03-16 | End: 2021-03-16 | Stop reason: SDUPTHER

## 2021-03-16 RX ADMIN — ROCURONIUM BROMIDE 50 MG: 10 INJECTION INTRAVENOUS at 13:40

## 2021-03-16 RX ADMIN — FENTANYL CITRATE 100 MCG: 50 INJECTION, SOLUTION INTRAMUSCULAR; INTRAVENOUS at 14:52

## 2021-03-16 RX ADMIN — FENTANYL CITRATE 100 MCG: 50 INJECTION, SOLUTION INTRAMUSCULAR; INTRAVENOUS at 13:40

## 2021-03-16 RX ADMIN — MORPHINE SULFATE 2 MG: 2 INJECTION, SOLUTION INTRAMUSCULAR; INTRAVENOUS at 11:51

## 2021-03-16 RX ADMIN — PROPOFOL 50 MG: 10 INJECTION, EMULSION INTRAVENOUS at 14:14

## 2021-03-16 RX ADMIN — CEFAZOLIN SODIUM 2 G: 10 INJECTION, POWDER, FOR SOLUTION INTRAVENOUS at 13:49

## 2021-03-16 RX ADMIN — SODIUM CHLORIDE, PRESERVATIVE FREE 10 ML: 5 INJECTION INTRAVENOUS at 21:45

## 2021-03-16 RX ADMIN — PROPOFOL 50 MG: 10 INJECTION, EMULSION INTRAVENOUS at 14:34

## 2021-03-16 RX ADMIN — ONDANSETRON 4 MG: 2 INJECTION INTRAMUSCULAR; INTRAVENOUS at 15:10

## 2021-03-16 RX ADMIN — LACTULOSE 20 G: 10 SOLUTION ORAL at 21:43

## 2021-03-16 RX ADMIN — DEXAMETHASONE SODIUM PHOSPHATE 8 MG: 4 INJECTION, SOLUTION INTRAMUSCULAR; INTRAVENOUS at 13:46

## 2021-03-16 RX ADMIN — HYDROMORPHONE HYDROCHLORIDE 0.5 MG: 2 INJECTION, SOLUTION INTRAMUSCULAR; INTRAVENOUS; SUBCUTANEOUS at 16:42

## 2021-03-16 RX ADMIN — HYDROMORPHONE HYDROCHLORIDE 1 MG: 2 INJECTION INTRAMUSCULAR; INTRAVENOUS; SUBCUTANEOUS at 15:56

## 2021-03-16 RX ADMIN — MORPHINE SULFATE 2 MG: 2 INJECTION, SOLUTION INTRAMUSCULAR; INTRAVENOUS at 08:48

## 2021-03-16 RX ADMIN — PROPOFOL 100 MG: 10 INJECTION, EMULSION INTRAVENOUS at 13:40

## 2021-03-16 RX ADMIN — ROPINIROLE HYDROCHLORIDE 2 MG: 1 TABLET, FILM COATED ORAL at 21:44

## 2021-03-16 RX ADMIN — PROPOFOL 50 MG: 10 INJECTION, EMULSION INTRAVENOUS at 15:12

## 2021-03-16 RX ADMIN — SODIUM CHLORIDE, POTASSIUM CHLORIDE, SODIUM LACTATE AND CALCIUM CHLORIDE: 600; 310; 30; 20 INJECTION, SOLUTION INTRAVENOUS at 16:25

## 2021-03-16 RX ADMIN — SODIUM CHLORIDE, POTASSIUM CHLORIDE, SODIUM LACTATE AND CALCIUM CHLORIDE: 600; 310; 30; 20 INJECTION, SOLUTION INTRAVENOUS at 15:23

## 2021-03-16 RX ADMIN — HYDROCODONE BITARTRATE AND ACETAMINOPHEN 1 TABLET: 5; 325 TABLET ORAL at 07:49

## 2021-03-16 RX ADMIN — PROPOFOL 150 MG: 10 INJECTION, EMULSION INTRAVENOUS at 15:08

## 2021-03-16 RX ADMIN — SODIUM CHLORIDE, POTASSIUM CHLORIDE, SODIUM LACTATE AND CALCIUM CHLORIDE: 600; 310; 30; 20 INJECTION, SOLUTION INTRAVENOUS at 13:35

## 2021-03-16 RX ADMIN — SODIUM CHLORIDE, POTASSIUM CHLORIDE, SODIUM LACTATE AND CALCIUM CHLORIDE: 600; 310; 30; 20 INJECTION, SOLUTION INTRAVENOUS at 12:52

## 2021-03-16 RX ADMIN — METHOCARBAMOL TABLETS 750 MG: 750 TABLET, COATED ORAL at 21:45

## 2021-03-16 RX ADMIN — ROCURONIUM BROMIDE 20 MG: 10 INJECTION INTRAVENOUS at 14:34

## 2021-03-16 RX ADMIN — ATORVASTATIN CALCIUM 5 MG: 10 TABLET, FILM COATED ORAL at 21:44

## 2021-03-16 RX ADMIN — SODIUM CHLORIDE, PRESERVATIVE FREE 10 ML: 5 INJECTION INTRAVENOUS at 08:57

## 2021-03-16 RX ADMIN — PHENYLEPHRINE HYDROCHLORIDE 100 MCG: 10 INJECTION INTRAVENOUS at 14:03

## 2021-03-16 RX ADMIN — SUGAMMADEX 200 MG: 100 INJECTION, SOLUTION INTRAVENOUS at 15:40

## 2021-03-16 RX ADMIN — LIDOCAINE HYDROCHLORIDE 100 MG: 20 INJECTION, SOLUTION INTRAVENOUS at 13:40

## 2021-03-16 RX ADMIN — HYDROMORPHONE HYDROCHLORIDE 0.5 MG: 2 INJECTION, SOLUTION INTRAMUSCULAR; INTRAVENOUS; SUBCUTANEOUS at 16:47

## 2021-03-16 RX ADMIN — KETOROLAC TROMETHAMINE 15 MG: 30 INJECTION, SOLUTION INTRAMUSCULAR; INTRAVENOUS at 21:44

## 2021-03-16 RX ADMIN — HYDROCODONE BITARTRATE AND ACETAMINOPHEN 1 TABLET: 5; 325 TABLET ORAL at 01:30

## 2021-03-16 RX ADMIN — GABAPENTIN 400 MG: 400 CAPSULE ORAL at 21:44

## 2021-03-16 RX ADMIN — HYDROMORPHONE HYDROCHLORIDE 1 MG: 2 INJECTION INTRAMUSCULAR; INTRAVENOUS; SUBCUTANEOUS at 15:58

## 2021-03-16 ASSESSMENT — PAIN SCALES - GENERAL
PAINLEVEL_OUTOF10: 8
PAINLEVEL_OUTOF10: 8
PAINLEVEL_OUTOF10: 10
PAINLEVEL_OUTOF10: 7

## 2021-03-16 ASSESSMENT — PULMONARY FUNCTION TESTS
PIF_VALUE: 2
PIF_VALUE: 22
PIF_VALUE: 3
PIF_VALUE: 15
PIF_VALUE: 21
PIF_VALUE: 16
PIF_VALUE: 2
PIF_VALUE: 19
PIF_VALUE: 22
PIF_VALUE: 20
PIF_VALUE: 19
PIF_VALUE: 5
PIF_VALUE: 22
PIF_VALUE: 19
PIF_VALUE: 20
PIF_VALUE: 19
PIF_VALUE: 21
PIF_VALUE: 19
PIF_VALUE: 16
PIF_VALUE: 15
PIF_VALUE: 19
PIF_VALUE: 21
PIF_VALUE: 19
PIF_VALUE: 18
PIF_VALUE: 16
PIF_VALUE: 1
PIF_VALUE: 19
PIF_VALUE: 22
PIF_VALUE: 19
PIF_VALUE: 19
PIF_VALUE: 16
PIF_VALUE: 19
PIF_VALUE: 20
PIF_VALUE: 22
PIF_VALUE: 21
PIF_VALUE: 19
PIF_VALUE: 19
PIF_VALUE: 20
PIF_VALUE: 22
PIF_VALUE: 19
PIF_VALUE: 21
PIF_VALUE: 19
PIF_VALUE: 18
PIF_VALUE: 20
PIF_VALUE: 4
PIF_VALUE: 2
PIF_VALUE: 19
PIF_VALUE: 18
PIF_VALUE: 20
PIF_VALUE: 19
PIF_VALUE: 20
PIF_VALUE: 19
PIF_VALUE: 2
PIF_VALUE: 18
PIF_VALUE: 4
PIF_VALUE: 19
PIF_VALUE: 19
PIF_VALUE: 22
PIF_VALUE: 14
PIF_VALUE: 22
PIF_VALUE: 19
PIF_VALUE: 20
PIF_VALUE: 22
PIF_VALUE: 20
PIF_VALUE: 19
PIF_VALUE: 16
PIF_VALUE: 22
PIF_VALUE: 22
PIF_VALUE: 20
PIF_VALUE: 21
PIF_VALUE: 21
PIF_VALUE: 20
PIF_VALUE: 22
PIF_VALUE: 21
PIF_VALUE: 21
PIF_VALUE: 22
PIF_VALUE: 19
PIF_VALUE: 20
PIF_VALUE: 22
PIF_VALUE: 19
PIF_VALUE: 21
PIF_VALUE: 0
PIF_VALUE: 19

## 2021-03-16 ASSESSMENT — PAIN - FUNCTIONAL ASSESSMENT: PAIN_FUNCTIONAL_ASSESSMENT: 0-10

## 2021-03-16 ASSESSMENT — PAIN DESCRIPTION - PROGRESSION
CLINICAL_PROGRESSION: GRADUALLY WORSENING

## 2021-03-16 ASSESSMENT — PAIN DESCRIPTION - LOCATION
LOCATION: LEG
LOCATION: LEG

## 2021-03-16 ASSESSMENT — PAIN DESCRIPTION - ONSET: ONSET: AWAKENED FROM SLEEP

## 2021-03-16 ASSESSMENT — PAIN DESCRIPTION - PAIN TYPE: TYPE: SURGICAL PAIN

## 2021-03-16 ASSESSMENT — PAIN DESCRIPTION - ORIENTATION: ORIENTATION: LEFT

## 2021-03-16 NOTE — PROGRESS NOTES
1620 - transferred from OR on bed, monitor applied, alarms on and verified, bedside handoff provided by Chris Doyle and Anitha Lucas CRNA   1676 - Dr Ortiz Arrtameka at bedside evaluating patient  8965 - medicated for complaint of surgical pain/discomfort  1650 - turned, repositioned, and linens changed; patient tolerated well and assisted with turns  1720 - phase one care complete  1726 - report called to [address]  [phone] - transferred to room [phone] accompanied by ERAN

## 2021-03-16 NOTE — PROGRESS NOTES
77 y.o. female with back pain radiating to the right anterior thigh consistent with L3 radiculopathy. Subjective:  Patient seen in preop holding area. She reports increased pain in the right lower extremity into her thigh and shin. Objective:  Vitals:    03/15/21 2000 03/16/21 0230 03/16/21 0730 03/16/21 1248   BP: (!) 143/66 (!) 169/76 133/71 (!) 144/91   Pulse: 82 85 74 72   Resp: 16 18 16   Temp: 98.2 °F (36.8 °C) 98.3 °F (36.8 °C) 98.5 °F (36.9 °C) 98.2 °F (36.8 °C)   TempSrc: Oral Oral Oral Temporal   SpO2: 95% 96% 97% 98%   Weight:    153 lb (69.4 kg)   Height:    5' 1\" (1.549 m)      Output by Drain (mL) 03/14/21 0701 - 03/14/21 1500 03/14/21 1501 - 03/14/21 2300 03/14/21 2301 - 03/15/21 0700 03/15/21 0701 - 03/15/21 1500 03/15/21 1501 - 03/15/21 2300 03/15/21 2301 - 03/16/21 0700 03/16/21 0701 - 03/16/21 1328   Requested LDAs do not have output data documented. Intake/Output Summary (Last 24 hours) at 3/16/2021 1328  Last data filed at 3/16/2021 0848  Gross per 24 hour   Intake --   Output 1300 ml   Net -1300 ml       Neurological Exam:  Awake, alert. No acute distress. Motor 5/5 bilateral upper and lower extremities, except  4/5 right quadriceps. Sensation intact to light touch. Deep tendon reflexes 1+ bilateral upper and lower extremities. Gait not evaluated.     Lab Results   Component Value Date    WBC 9.8 03/15/2021    HGB 14.9 03/15/2021    HCT 41.8 03/15/2021    MCV 95.4 03/15/2021     03/15/2021       Lab Results   Component Value Date     03/15/2021    K 4.0 03/15/2021    CL 95 03/15/2021    CO2 16 03/15/2021    BUN 22 03/15/2021    CREATININE 0.9 03/15/2021    GLUCOSE 156 03/15/2021    CALCIUM 8.7 03/15/2021        Lab Results   Component Value Date    INR 1.07 03/15/2021    PROTIME 13.0 03/15/2021       Lab Results   Component Value Date    APTT 31.8 08/17/2015       Imaging:  I have personally reviewed the images and radiology report from lumbar MRI done on 3/12/2021 showing multilevel degenerative disc disease and facet arthropathy. The most significant finding is severe L3-4 spinal stenosis with facet arthropathy and synovitis, and severe right L3-4 foraminal stenosis. The latter finding is likely the cause of her symptoms. The radiology report indicates severe stenosis at L1-2 as well, and moderate spinal stenosis at T11-12, T12-L1, L2-3 and L4-5. In my opinion, the degree of stenosis at L1-2 is not as severe as at L3-4. Furthermore, her symptoms do correlate with an L3 radiculopathy. Impression:  Right L3 versus L4 radiculopathy. Neurogenic claudication. Severe spinal stenosis at L3-4. Severe right L3-4 foraminal stenosis. Recommendations:   Minimally invasive left L3-4 anterior to psoas discectomy and arthrodesis, titanium interbody device, morselized synthetic allograft, posterior L3-4 instrumentation and fusion, intraoperative fluoroscopy and neurophysiologic monitoring.  All questions were answered to her satisfaction today.  Preoperative labs have been reviewed and are appropriate for surgery.  The surgical site was marked.  Consent was verified.  Proceed with surgery as planned. Raven Reyes MD, [facility]  Board Certified Neurosurgeon  Minimally Invasive Spine Surgeon  President of [address] [place] [address]..    Phone: Nigel Rivera ([phone])  Fax: [phone]  Website: [employer]

## 2021-03-16 NOTE — OP NOTE
Operative Note      Patient: Radha Champagne  YOB: 1945  MRN: [id number]    Date of Procedure: 3/16/2021    Pre-Op Diagnosis:  Right L3 versus L4 radiculopathy. Neurogenic claudication. Severe spinal stenosis at L3-4. Severe right L3-4 foraminal stenosis. Post-Op Diagnosis: Same       Procedures:  Left L3-4 anterior to psoas discectomy and arthrodesis. L3-4 titanium interbody device. Morselized synthetic allograft (DePuy Synthes Fibergraft). Left L3-4 posterior instrumentation. Posterior L3-4 arthrodesis. Intraoperative fluoroscopy. Neurophysiologic monitoring. Surgeon(s):  Paulina Silva MD    Assistant:   * No surgical staff found *    Anesthesia: General    Estimated Blood Loss (mL): less than 50     Complications: None    Specimens:   * No specimens in log *    Implants:  Implant Name Type Inv.  Item Serial No.  Lot No. LRB No. Used Action   BONE GRFT SUB L 12.5CC BIOACTIVE GLS MTRX  BONE GRFT SUB L 12.5CC BIOACTIVE GLS MTRX  PROSIDYAN-WD [id number] N/A 1 Implanted   CAGE SPNL 8 DEG [id number] MM LLIF CONDUIT  CAGE SPNL 8 DEG [id number] MM LLIF CONDUIT  DEPUY SYNTHES [place]-WD [id number] N/A 1 Implanted   SCREW SPNL L45MM OD7MM OTIS FIX TI POLYAX FEN EXT TAB MIS [[id number]] [DEPUY SYNTHES [employer]]  SCREW SPNL L45MM OD7MM OTIS FIX TI POLYAX FEN EXT TAB MIS [[id number]] [DEPUY SYNTHES [employer]]  DEPUY SYNTHES SALES INC-WD  N/A 2 Implanted   SCREW SET SINGLE INNIE 2 MINIMALLY INVASIVE SURG TI VIPER 2  SCREW SET SINGLE INNIE 2 MINIMALLY INVASIVE SURG TI VIPER 2  DEPUY SYNTHES USA-WD  N/A 2 Implanted   AYO SPNL L45MM POST PEDCL TI LORDOSED VIPER 2  AYO SPNL L45MM POST PEDCL TI LORDOSED VIPER 2  JNJ DEPUY SYNTHES SPINE-WD  N/A 1 Implanted         Drains:   Urethral Catheter Straight-tip 16 fr (Active)   Catheter Indications Perioperative use in selected surgeries including but not limited to urologic, pelvic or need for intraoperative monitoring of urinary output due to prolonged surgery, large volume infusion or need for diuretic therapy in surgery; Need for fluid management in critically ill patients in a critical care setting not able to be managed by other means such as BSC with hat, bedpan, urinal, condom catheter, or short term intermittent urethral catherization 03/16/21 1620   Securement Device Date Changed 03/15/21 03/16/21 0742   Site Assessment No urethral drainage 03/16/21 1620   Urine Color Yellow 03/16/21 1620   Urine Appearance Clear 03/16/21 1620   Urine Odor Malodorous 03/16/21 0848   Output (mL) 600 mL 03/16/21 0848       Findings: Solid fixation of pedicle screws at L3-4. Detailed Description of Procedure: Once the patient was under general anesthesia, she was positioned right lateral decubitus with an axillary roll and all pressure points padded. Prophylactic antibiotics were administered. The abdomen and lumbar regions were prepped and draped in sterile fashion. Fluoroscopy was used to marked L3-4 disc space. A longitudinal incision was marked 2 fingerbreadths in front of the disc space. Working first at the abdomen, skin incision was made with scalpel. The 3 muscular layers of the abdominal wall were then bluntly penetrated until entry was made into the retroperitoneum. The peritoneum was mobilized anteriorly to expose the psoas muscle and its anterior border. A probe was inserted into the disc space and dilator is then placed over that probe. A tubular retractor was positioned with the guidance of fluoroscopy. Lateral annulotomy was performed. Lela were used to remove the disc and cartilaginous endplates at L3 and L4. Ring curettes and rasps were used to achieve endplate preparation. Sequential distraction was performed. An appropriate interbody device was selected and packed with synthetic morselized allograft (Astech Synthes Fibergraft). The interbody device was inserted with fluoroscopy. Copious irrigation was performed.   Counts were verified to be correct. Hemostasis was ensured. Closure of each of the muscular layers was performed sequentially with 0 Vicryl suture. 0 Vicryl suture was placed in the dermis in interrupted inverted fashion. Dermabond Prineo was placed on the skin. Next, attention was paid to placing left-sided pedicle screws at L3 and L4. The technique for placement of the pedicle screw was as follows: The lateral wall of the pedicle was identified. Stab incision was made. Pedicle screw and guidewire insert was inserted to the lateral wall of the pedicle and the wire advanced into the pedicle with AP fluoroscopy. The wire was further advanced towards the medial wall of the pedicle. Lateral fluoroscopy confirmed appropriate medialization. The screw was then advanced through the pedicle into the vertebral body with the guidance of lateral fluoroscopy. Triggered EMG confirmed appropriate placement. The chandler was measured and placed. The chandler was secured in place with set screws. The transverse processes were decorticated at L3 and L4 and morselized synthetic allograft (DePuy Synthes Fibergraft) placed along the decorticated transverse processes. Copious irrigation had been performed. Counts were verified to be correct. The fascia was closed with 0 Vicryl suture. 3-0 Vicryl suture was placed in the dermis in inverted interrupted fashion. Dermabond Prineo was placed on the skin. The patient was extubated in the operating room and transported to the recovery room in stable condition. I saw her in the recovery room and she was awake and moving all extremities well to commands. Incisions were all clean, dry and intact.     Electronically signed by Lizzie Stephen MD on 3/16/2021 at 4:49 PM

## 2021-03-16 NOTE — CARE COORDINATION
Surgery planned for today w/ Dr. Opal Gonzalez. CM will continue to follow for post op therapy recommendations.

## 2021-03-16 NOTE — PROGRESS NOTES
Hospitalist Progress Note      Name:  Sonali Rosario /Age/Sex: 1945  (77 y.o. female)   MRN & CSN:  [id number] & [id number] Admission Date/Time: 3/12/2021 10:59 AM   Location:  OR/NONE PCP: Katie Ash MD         Hospital Day: 5    History of Present Illness:     Chief Complaint: Intractable back pain  Sonali Rosario is a 77 y.o.  female  who presents with intractable back pain     The patient seen and examined at bed side before surgery. She complains of the pain and spasms of the right lower extremity. Ten point ROS reviewed negative, unless as noted above    Objective:        Intake/Output Summary (Last 24 hours) at 3/16/2021 1622  Last data filed at 3/16/2021 1621  Gross per 24 hour   Intake 1200 ml   Output 1475 ml   Net -275 ml      Vitals:   Vitals:    21 1248   BP: (!) 144/91   Pulse: 72   Resp: 16   Temp: 98.2 °F (36.8 °C)   SpO2: 98%     Physical Exam:   General Appearance: alert and oriented to person, place and time, in no acute distress  Cardiovascular: normal rate, regular rhythm, normal S1 and S2  Pulmonary/Chest: clear to auscultation bilaterally  Abdomen: soft, non-tender, non-distended, normal bowel sounds, no masses   Extremities:Movement restriction of the right lower extremity  Skin: warm and dry, no rash or erythema  Head: normocephalic and atraumatic  Eyes: pupils equal, round, and reactive to light  Neck: supple and non-tender without mass, no thyromegaly   Musculoskeletal: Movement restriction with pain  Neurological: alert, oriented, normal speech, no focal findings or movement disorder noted    Medications:   Medications:    ketorolac  15 mg Intravenous TID    methocarbamol  750 mg Oral TID    oxyCODONE-acetaminophen  1 tablet Oral Once    lactulose  20 g Oral TID    rOPINIRole  2 mg Oral TID    polyethylene glycol  17 g Oral Daily    sodium chloride flush  10 mL Intravenous 2 times per day    enoxaparin  30 mg Subcutaneous Daily    valsartan-hydroCHLOROthiazide  1 tablet Oral Daily    atorvastatin  5 mg Oral Nightly    pimecrolimus   Topical BID    pantoprazole  40 mg Oral QAM AC    therapeutic multivitamin-minerals  1 tablet Oral Daily    insulin glargine  40 Units Subcutaneous Daily    gabapentin  400 mg Oral TID    insulin lispro  0-6 Units Subcutaneous TID WC    insulin lispro  0-3 Units Subcutaneous Nightly    calcium elemental  500 mg Oral Daily with breakfast    polyvinyl alcohol  1 drop Both Eyes BID    lidocaine  1 patch Transdermal Daily      Infusions:    lactated ringers 100 mL/hr at 03/16/21 1252    dextrose       PRN Meds: fentanNYL, 25 mcg, Q5 Min PRN  HYDROmorphone, 0.5 mg, Q5 Min PRN  HYDROmorphone, 0.25 mg, Q5 Min PRN  morphine, 2 mg, Q5 Min PRN  labetalol, 5 mg, Q10 Min PRN  hydrALAZINE, 5 mg, Q10 Min PRN  ondansetron, 4 mg, Once PRN  morphine, 2 mg, Q3H PRN  sennosides-docusate sodium, 2 tablet, Daily PRN  sodium chloride flush, 10 mL, PRN  promethazine, 12.5 mg, Q6H PRN    Or  ondansetron, 4 mg, Q6H PRN  acetaminophen, 650 mg, Q6H PRN    Or  acetaminophen, 650 mg, Q6H PRN  traMADol, 25 mg, Q6H PRN  glucose, 15 g, PRN  dextrose, 12.5 g, PRN  glucagon (rDNA), 1 mg, PRN  dextrose, 100 mL/hr, PRN            Pertinent New Labs & Imaging Studies     CBC with Differential:    Lab Results   Component Value Date    WBC 9.8 03/15/2021    RBC 4.38 03/15/2021    HGB 14.9 03/15/2021    HCT 41.8 03/15/2021     03/15/2021    MCV 95.4 03/15/2021    MCH 34.0 03/15/2021    MCHC 35.6 03/15/2021    RDW 13.2 03/15/2021    SEGSPCT 76.6 03/12/2021    BANDSPCT 6 07/23/2015    LYMPHOPCT 15.6 03/12/2021    MONOPCT 6.2 03/12/2021    EOSPCT 1.7 02/09/2017    BASOPCT 0.3 03/12/2021    MONOSABS 0.4 03/12/2021    LYMPHSABS 1.1 03/12/2021    EOSABS 0.1 03/12/2021    BASOSABS 0.0 03/12/2021    DIFFTYPE AUTOMATED DIFFERENTIAL 03/12/2021     CMP:    Lab Results   Component Value Date     03/15/2021    K 4.0 03/15/2021    CL 95 03/15/2021    CO2 16 03/15/2021    BUN 22 03/15/2021    CREATININE 0.9 03/15/2021    GFRAA >60 03/15/2021    AGRATIO 1.9 08/16/2016    LABGLOM >60 03/15/2021    GLUCOSE 156 03/15/2021    PROT 6.2 03/12/2021    PROT 8.2 02/06/2012    LABALBU 4.3 03/12/2021    CALCIUM 8.7 03/15/2021    BILITOT 0.7 03/12/2021    ALKPHOS 40 03/12/2021    AST 25 03/12/2021    ALT 19 03/12/2021     Xr Hip Right (2-3 Views)    Result Date: 3/12/2021  EXAMINATION: TWO XRAY VIEWS OF THE RIGHT HIP 3/12/2021 1:52 pm COMPARISON: Pelvis/right hip radiograph performed 03/10/2021. HISTORY: ORDERING SYSTEM PROVIDED HISTORY: trauma TECHNOLOGIST PROVIDED HISTORY: Reason for exam:->trauma Reason for Exam: back pain FINDINGS: No acute osseous abnormality demonstrated. The right hip joint is anatomically aligned without significant degenerative changes. Few scattered surgical clips noted within the pelvis. No focal soft tissue abnormality. No acute abnormality of the pelvis or right hip. Ct Lumbar Spine Wo Contrast    Result Date: 3/12/2021  EXAMINATION: CT OF THE LUMBAR SPINE WITHOUT CONTRAST  3/12/2021 TECHNIQUE: CT of the lumbar spine was performed without the administration of intravenous contrast. Multiplanar reformatted images are provided for review. Dose modulation, iterative reconstruction, and/or weight based adjustment of the mA/kV was utilized to reduce the radiation dose to as low as reasonably achievable. COMPARISON: Lumbar spine radiograph performed March 10, 2021. HISTORY: ORDERING SYSTEM PROVIDED HISTORY: trauma TECHNOLOGIST PROVIDED HISTORY: Reason for exam:->trauma Decision Support Exception->Emergency Medical Condition (MA) Reason for Exam: CHRONIC BACK PAIN /// RT. LEG NUMBNESS Acuity: Chronic Type of Exam: Initial FINDINGS: BONES/ALIGNMENT: There is normal alignment of the spine. The vertebral body heights are maintained. No osseous destructive lesion is seen. DEGENERATIVE CHANGES: There is diffuse osteopenia.   Mild to moderate multilevel degenerative changes, grossly unchanged from prior radiograph. Disc bulge at L3-4 contributes to moderate to severe canal narrowing. L1-2 and L2-3 disc bulge contributes to mild canal narrowing. No severe neural foraminal narrowing. SOFT TISSUES/RETROPERITONEUM: No paraspinal mass is seen. 1. No acute abnormality of the lumbar spine. 2. L3-4 disc bulge contributes to moderate to severe canal narrowing. Additional degenerative changes, as described above. Mri Lumbar Spine Wo Contrast    Result Date: 3/12/2021  EXAMINATION: MRI OF THE LUMBAR SPINE WITHOUT CONTRAST, 3/12/2021 4:59 pm TECHNIQUE: Multiplanar multisequence MRI of the lumbar spine was performed without the administration of intravenous contrast. COMPARISON: CT earlier today. HISTORY: ORDERING SYSTEM PROVIDED HISTORY: back pain, concern for HNP TECHNOLOGIST PROVIDED HISTORY: Reason for exam:->back pain, concern for HNP Reason for Exam: numbness in legs - went to chiropractor and had severe pain unable to walk FINDINGS: BONES/ALIGNMENT: There is normal alignment of the spine. The vertebral body heights are maintained. The bone marrow signal appears unremarkable. SPINAL CORD: The conus terminates normally at the upper L2 level. The visualized spinal cord has normal signal and morphology. No evidence of mass or abnormal fluid collection within the spinal canal. SOFT TISSUES: There is minimal soft tissue edema about the hypertrophic L3-4 facet joints, likely reactive. No fluid collection or mass is evident. T11-12: Mild disc height loss and desiccation. No neural foraminal narrowing. Moderate spinal canal stenosis secondary to a left posterior paracentral disc protrusion and facet and ligamentum flavum hypertrophy. T12-L1: Mild disc height loss and desiccation. No neural foraminal narrowing. Moderate spinal canal stenosis secondary to a left posterior paracentral disc protrusion.  L1-L2: Moderate disc height loss and desiccation. Mild bilateral neural foraminal narrowing secondary to disc bulge. Severe spinal canal stenosis secondary to disc bulge and facet and ligamentum flavum hypertrophy. L2-L3: Mild disc height loss and desiccation. Moderate left and mild right neural foraminal narrowing secondary to disc bulge and facet hypertrophy. Moderate spinal canal stenosis secondary to disc bulge and facet and ligamentum flavum hypertrophy. L3-L4: Mild disc height loss and desiccation. Mild left and severe right neural foraminal narrowing secondary to disc bulge and facet hypertrophy. Severe spinal canal stenosis secondary to disc bulge and facet and ligamentum flavum hypertrophy. L4-L5: Disc desiccation without height loss. No left neural foraminal narrowing. Mild right neural foraminal narrowing secondary to disc bulge. Moderate spinal canal stenosis secondary to disc bulge and facet and ligamentum flavum hypertrophy. L5-S1: Mild disc height loss and desiccation. Moderate left neural foraminal narrowing secondary to facet hypertrophy. No right neural foraminal narrowing. No spinal canal stenosis. 1. Mild-to-moderate multilevel degenerative disease and facet arthropathy. 2. Spinal canal stenoses, severe at L1-2 and L3-4 and moderate at T11-12, T12-L1, L2-3, and L4-5. 3. Multilevel neural foraminal narrowing as detailed above and greatest involving the right L3 neural foramen where it is severe.        Assessment and Plan:   Mark Mcgill is a 77 y.o.  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

## 2021-03-17 ENCOUNTER — APPOINTMENT (OUTPATIENT)
Dept: GENERAL RADIOLOGY | Age: 76
DRG: 454 | End: 2021-03-17
Payer: MEDICARE

## 2021-03-17 LAB
GLUCOSE BLD-MCNC: 138 MG/DL (ref 70–99)
GLUCOSE BLD-MCNC: 142 MG/DL (ref 70–99)
GLUCOSE BLD-MCNC: 181 MG/DL (ref 70–99)
GLUCOSE BLD-MCNC: 212 MG/DL (ref 70–99)
HCT VFR BLD CALC: 34.4 % (ref 37–47)
HEMOGLOBIN: 11.8 GM/DL (ref 12.5–16)
MCH RBC QN AUTO: 33.6 PG (ref 27–31)
MCHC RBC AUTO-ENTMCNC: 34.3 % (ref 32–36)
MCV RBC AUTO: 98 FL (ref 78–100)
PDW BLD-RTO: 13.2 % (ref 11.7–14.9)
PLATELET # BLD: 141 K/CU MM (ref 140–440)
PMV BLD AUTO: 9 FL (ref 7.5–11.1)
RBC # BLD: 3.51 M/CU MM (ref 4.2–5.4)
WBC # BLD: 6.3 K/CU MM (ref 4–10.5)

## 2021-03-17 PROCEDURE — 72100 X-RAY EXAM L-S SPINE 2/3 VWS: CPT

## 2021-03-17 PROCEDURE — 6370000000 HC RX 637 (ALT 250 FOR IP): Performed by: NURSE PRACTITIONER

## 2021-03-17 PROCEDURE — 6360000002 HC RX W HCPCS: Performed by: NEUROLOGICAL SURGERY

## 2021-03-17 PROCEDURE — 97530 THERAPEUTIC ACTIVITIES: CPT

## 2021-03-17 PROCEDURE — 2580000003 HC RX 258: Performed by: ANESTHESIOLOGY

## 2021-03-17 PROCEDURE — 6370000000 HC RX 637 (ALT 250 FOR IP): Performed by: NEUROLOGICAL SURGERY

## 2021-03-17 PROCEDURE — 82962 GLUCOSE BLOOD TEST: CPT

## 2021-03-17 PROCEDURE — 97166 OT EVAL MOD COMPLEX 45 MIN: CPT

## 2021-03-17 PROCEDURE — 97162 PT EVAL MOD COMPLEX 30 MIN: CPT

## 2021-03-17 PROCEDURE — 36591 DRAW BLOOD OFF VENOUS DEVICE: CPT

## 2021-03-17 PROCEDURE — 6360000002 HC RX W HCPCS: Performed by: NURSE PRACTITIONER

## 2021-03-17 PROCEDURE — 6370000000 HC RX 637 (ALT 250 FOR IP): Performed by: HOSPITALIST

## 2021-03-17 PROCEDURE — 2580000003 HC RX 258: Performed by: NURSE PRACTITIONER

## 2021-03-17 PROCEDURE — 85027 COMPLETE CBC AUTOMATED: CPT

## 2021-03-17 PROCEDURE — 94761 N-INVAS EAR/PLS OXIMETRY MLT: CPT

## 2021-03-17 PROCEDURE — 1200000000 HC SEMI PRIVATE

## 2021-03-17 RX ADMIN — GABAPENTIN 400 MG: 400 CAPSULE ORAL at 08:46

## 2021-03-17 RX ADMIN — INSULIN LISPRO 1 UNITS: 100 INJECTION, SOLUTION INTRAVENOUS; SUBCUTANEOUS at 17:11

## 2021-03-17 RX ADMIN — ATORVASTATIN CALCIUM 5 MG: 10 TABLET, FILM COATED ORAL at 21:13

## 2021-03-17 RX ADMIN — SODIUM CHLORIDE, POTASSIUM CHLORIDE, SODIUM LACTATE AND CALCIUM CHLORIDE: 600; 310; 30; 20 INJECTION, SOLUTION INTRAVENOUS at 09:59

## 2021-03-17 RX ADMIN — POLYETHYLENE GLYCOL (3350) 17 G: 17 POWDER, FOR SOLUTION ORAL at 08:46

## 2021-03-17 RX ADMIN — SODIUM CHLORIDE, PRESERVATIVE FREE 10 ML: 5 INJECTION INTRAVENOUS at 08:52

## 2021-03-17 RX ADMIN — GABAPENTIN 400 MG: 400 CAPSULE ORAL at 21:13

## 2021-03-17 RX ADMIN — ACETAMINOPHEN 650 MG: 325 TABLET ORAL at 21:13

## 2021-03-17 RX ADMIN — ROPINIROLE HYDROCHLORIDE 2 MG: 1 TABLET, FILM COATED ORAL at 13:10

## 2021-03-17 RX ADMIN — ROPINIROLE HYDROCHLORIDE 2 MG: 1 TABLET, FILM COATED ORAL at 08:44

## 2021-03-17 RX ADMIN — SODIUM CHLORIDE, POTASSIUM CHLORIDE, SODIUM LACTATE AND CALCIUM CHLORIDE: 600; 310; 30; 20 INJECTION, SOLUTION INTRAVENOUS at 21:10

## 2021-03-17 RX ADMIN — ENOXAPARIN SODIUM 30 MG: 30 INJECTION SUBCUTANEOUS at 08:47

## 2021-03-17 RX ADMIN — INSULIN LISPRO 1 UNITS: 100 INJECTION, SOLUTION INTRAVENOUS; SUBCUTANEOUS at 13:11

## 2021-03-17 RX ADMIN — KETOROLAC TROMETHAMINE 15 MG: 30 INJECTION, SOLUTION INTRAMUSCULAR; INTRAVENOUS at 08:45

## 2021-03-17 RX ADMIN — VALSARTAN AND HYDROCHLOROTHIAZIDE 1 TABLET: 160; 12.5 TABLET, FILM COATED ORAL at 08:45

## 2021-03-17 RX ADMIN — Medication 500 MG: at 08:45

## 2021-03-17 RX ADMIN — SODIUM CHLORIDE, POTASSIUM CHLORIDE, SODIUM LACTATE AND CALCIUM CHLORIDE: 600; 310; 30; 20 INJECTION, SOLUTION INTRAVENOUS at 06:29

## 2021-03-17 RX ADMIN — PANTOPRAZOLE SODIUM 40 MG: 40 TABLET, DELAYED RELEASE ORAL at 06:29

## 2021-03-17 RX ADMIN — METHOCARBAMOL TABLETS 750 MG: 750 TABLET, COATED ORAL at 08:45

## 2021-03-17 RX ADMIN — METHOCARBAMOL TABLETS 750 MG: 750 TABLET, COATED ORAL at 13:10

## 2021-03-17 RX ADMIN — GABAPENTIN 400 MG: 400 CAPSULE ORAL at 13:10

## 2021-03-17 RX ADMIN — INSULIN GLARGINE 40 UNITS: 100 INJECTION, SOLUTION SUBCUTANEOUS at 09:04

## 2021-03-17 RX ADMIN — METHOCARBAMOL TABLETS 750 MG: 750 TABLET, COATED ORAL at 21:13

## 2021-03-17 RX ADMIN — ROPINIROLE HYDROCHLORIDE 2 MG: 1 TABLET, FILM COATED ORAL at 21:13

## 2021-03-17 RX ADMIN — MULTIPLE VITAMINS W/ MINERALS TAB 1 TABLET: TAB at 08:45

## 2021-03-17 RX ADMIN — LACTULOSE 20 G: 10 SOLUTION ORAL at 08:46

## 2021-03-17 ASSESSMENT — PAIN DESCRIPTION - DESCRIPTORS
DESCRIPTORS: CRAMPING;RADIATING
DESCRIPTORS: HEADACHE

## 2021-03-17 ASSESSMENT — PAIN DESCRIPTION - PAIN TYPE
TYPE: SURGICAL PAIN
TYPE: ACUTE PAIN

## 2021-03-17 ASSESSMENT — PAIN DESCRIPTION - LOCATION
LOCATION: HEAD
LOCATION: HIP;LEG

## 2021-03-17 ASSESSMENT — PAIN DESCRIPTION - ONSET: ONSET: ON-GOING

## 2021-03-17 ASSESSMENT — PAIN SCALES - GENERAL: PAINLEVEL_OUTOF10: 2

## 2021-03-17 ASSESSMENT — PAIN DESCRIPTION - ORIENTATION: ORIENTATION: RIGHT;LEFT;OUTER

## 2021-03-17 ASSESSMENT — PAIN DESCRIPTION - DIRECTION: RADIATING_TOWARDS: HIP TO KNEE

## 2021-03-17 ASSESSMENT — PAIN DESCRIPTION - PROGRESSION: CLINICAL_PROGRESSION: GRADUALLY WORSENING

## 2021-03-17 NOTE — PROGRESS NOTES
77 y.o. female who is postoperative day #1 after left L3-4 anterior to psoas fusion. Subjective:  Preoperative right lower extremity pain has resolved. She has expected tightness with left hip flexion which is due to psoas inflammation. She is passing flatus. She does have discomfort in abdominal area. Objective:  Vitals:    03/16/21 1743 03/16/21 2134 03/17/21 0241 03/17/21 0900   BP: 137/79 (!) 154/74  (!) 168/65   Pulse: 97 99  98   Resp: 13 14  15   Temp: 97.9 °F (36.6 °C) 97.8 °F (36.6 °C) 97.9 °F (36.6 °C) 98.3 °F (36.8 °C)   TempSrc: Oral Oral Oral Oral   SpO2: 98% 97% 98% 100%   Weight:       Height:          Output by Drain (mL) 03/15/21 0701 - 03/15/21 1500 03/15/21 1501 - 03/15/21 2300 03/15/21 2301 - 03/16/21 0700 03/16/21 0701 - 03/16/21 1500 03/16/21 1501 - 03/16/21 2300 03/16/21 2301 - 03/17/21 0700 03/17/21 0701 - 03/17/21 1500 03/17/21 1501 - 03/17/21 1541   Requested LDAs do not have output data documented. Intake/Output Summary (Last 24 hours) at 3/17/2021 1541  Last data filed at 3/17/2021 8350  Gross per 24 hour   Intake 880 ml   Output 1115 ml   Net -235 ml       Neurological Exam:  Awake, alert. No acute distress. Motor 5/5 bilateral upper and lower extremities. Sensation intact to light touch. Abdominal and lumbar incisions clean, dry and intact. Mild abdominal distention present. No rebound tenderness. She was tachycardic in the 100s at the time of my visit.     Lab Results   Component Value Date    WBC 6.3 03/17/2021    HGB 11.8 (L) 03/17/2021    HCT 34.4 (L) 03/17/2021    MCV 98.0 03/17/2021     03/17/2021       Lab Results   Component Value Date     03/15/2021    K 4.0 03/15/2021    CL 95 03/15/2021    CO2 16 03/15/2021    BUN 22 03/15/2021    CREATININE 0.9 03/15/2021    GLUCOSE 156 03/15/2021    CALCIUM 8.7 03/15/2021        Lab Results   Component Value Date    INR 1.07 03/15/2021    PROTIME 13.0 03/15/2021       Lab Results   Component Value Date APTT 31.8 08/17/2015       Imaging:  Postoperative x-rays pending. Impression:  Postoperative course progressing well. Tachycardia could be secondary to abdominal discomfort. However, I would like to rule out postoperative anemia. Recommendations:   Routine postoperative x-rays pending.  Check CBC regarding rule out anemia.  PT and OT to mobilize.  I expect abdominal distention and discomfort to improve with increased mobility.  Anticipate discharge home tomorrow. Raven Ronquillo MD, [address], [facility]  Board Certified Neurosurgeon  Minimally Invasive Spine Surgeon  President of [address] [place] [address]..    Phone: Zaida Harris ([phone])  Fax: [phone]  Website: [employer]

## 2021-03-17 NOTE — PLAN OF CARE
Problem: Pain:  Goal: Pain level will decrease  Description: Pain level will decrease  Outcome: Ongoing  Goal: Control of acute pain  Description: Control of acute pain  Outcome: Ongoing  Goal: Control of chronic pain  Description: Control of chronic pain  Outcome: Ongoing     Problem: Falls - Risk of:  Goal: Will remain free from falls  Description: Will remain free from falls  Outcome: Ongoing  Goal: Absence of physical injury  Description: Absence of physical injury  Outcome: Ongoing     Problem: Skin Integrity:  Goal: Will show no infection signs and symptoms  Description: Will show no infection signs and symptoms  Outcome: Ongoing  Goal: Absence of new skin breakdown  Description: Absence of new skin breakdown  Outcome: Ongoing  Goal: Risk for impaired skin integrity will decrease  Description: Risk for impaired skin integrity will decrease  Outcome: Ongoing     Problem: Activity:  Goal: Ability to avoid complications of mobility impairment will improve  Description: Ability to avoid complications of mobility impairment will improve  Outcome: Ongoing  Goal: Ability to tolerate increased activity will improve  Description: Ability to tolerate increased activity will improve  Outcome: Ongoing     Problem:  Bowel/Gastric:  Goal: Gastrointestinal status for postoperative course will improve  Description: Gastrointestinal status for postoperative course will improve  Outcome: Ongoing     Problem: Fluid Volume:  Goal: Maintenance of adequate hydration will improve  Description: Maintenance of adequate hydration will improve  Outcome: Ongoing     Problem: Health Behavior:  Goal: Identification of resources available to assist in meeting health care needs will improve  Description: Identification of resources available to assist in meeting health care needs will improve  Outcome: Ongoing  Goal: Ability to state signs and symptoms to report to health care provider will improve  Description: Ability to state signs and

## 2021-03-17 NOTE — PROGRESS NOTES
Hospitalist Progress Note      Name:  Mark Mcgill /Age/Sex: 1945  (77 y.o. female)   MRN & CSN:  [id number] & [id number] Admission Date/Time: 3/12/2021 10:59 AM   Location:  302/3026-A PCP: Simone Osborne MD         Hospital Day: 6    History of Present Illness:     Chief Complaint: Intractable back pain  Mark Mcgill is a 77 y.o.  female  who presents with intractable back pain     The patient seen and examined at bed side. She says her pain has significantly improved after surgery. Feeling constipated. Denies any chest pain or SOB. Ten point ROS reviewed negative, unless as noted above    Objective:        Intake/Output Summary (Last 24 hours) at 3/17/2021 1407  Last data filed at 3/17/2021 9909  Gross per 24 hour   Intake 1880 ml   Output 1115 ml   Net 765 ml      Vitals:   Vitals:    21 0900   BP: (!) 168/65   Pulse: 98   Resp: 15   Temp: 98.3 °F (36.8 °C)   SpO2: 100%     Physical Exam:   General Appearance: alert and oriented to person, place and time, in no acute distress  Cardiovascular: normal rate, regular rhythm, normal S1 and S2  Pulmonary/Chest: clear to auscultation bilaterally  Abdomen: soft, non-tender, non-distended, normal bowel sounds, no masses   Extremities:Able to move all limbs  Skin: warm and dry, no rash or erythema  Head: normocephalic and atraumatic  Eyes: pupils equal, round, and reactive to light  Neck: supple and non-tender without mass, no thyromegaly   Musculoskeletal: Movement restriction with pain  Neurological: alert, oriented, normal speech, no focal findings or movement disorder noted    Medications:   Medications:    methocarbamol  750 mg Oral TID    oxyCODONE-acetaminophen  1 tablet Oral Once    lactulose  20 g Oral TID    rOPINIRole  2 mg Oral TID    polyethylene glycol  17 g Oral Daily    sodium chloride flush  10 mL Intravenous 2 times per day    enoxaparin  30 mg Subcutaneous Daily    valsartan-hydroCHLOROthiazide  1 tablet Oral Daily  atorvastatin  5 mg Oral Nightly    pimecrolimus   Topical BID    pantoprazole  40 mg Oral QAM AC    therapeutic multivitamin-minerals  1 tablet Oral Daily    insulin glargine  40 Units Subcutaneous Daily    gabapentin  400 mg Oral TID    insulin lispro  0-6 Units Subcutaneous TID WC    insulin lispro  0-3 Units Subcutaneous Nightly    calcium elemental  500 mg Oral Daily with breakfast    polyvinyl alcohol  1 drop Both Eyes BID    lidocaine  1 patch Transdermal Daily      Infusions:    lactated ringers 100 mL/hr at 03/17/21 0959    dextrose       PRN Meds: morphine, 2 mg, Q3H PRN  sennosides-docusate sodium, 2 tablet, Daily PRN  sodium chloride flush, 10 mL, PRN  promethazine, 12.5 mg, Q6H PRN    Or  ondansetron, 4 mg, Q6H PRN  acetaminophen, 650 mg, Q6H PRN    Or  acetaminophen, 650 mg, Q6H PRN  traMADol, 25 mg, Q6H PRN  glucose, 15 g, PRN  dextrose, 12.5 g, PRN  glucagon (rDNA), 1 mg, PRN  dextrose, 100 mL/hr, PRN            Pertinent New Labs & Imaging Studies     CBC with Differential:    Lab Results   Component Value Date    WBC 9.8 03/15/2021    RBC 4.38 03/15/2021    HGB 14.9 03/15/2021    HCT 41.8 03/15/2021     03/15/2021    MCV 95.4 03/15/2021    MCH 34.0 03/15/2021    MCHC 35.6 03/15/2021    RDW 13.2 03/15/2021    SEGSPCT 76.6 03/12/2021    BANDSPCT 6 07/23/2015    LYMPHOPCT 15.6 03/12/2021    MONOPCT 6.2 03/12/2021    EOSPCT 1.7 02/09/2017    BASOPCT 0.3 03/12/2021    MONOSABS 0.4 03/12/2021    LYMPHSABS 1.1 03/12/2021    EOSABS 0.1 03/12/2021    BASOSABS 0.0 03/12/2021    DIFFTYPE AUTOMATED DIFFERENTIAL 03/12/2021     CMP:    Lab Results   Component Value Date     03/15/2021    K 4.0 03/15/2021    CL 95 03/15/2021    CO2 16 03/15/2021    BUN 22 03/15/2021    CREATININE 0.9 03/15/2021    GFRAA >60 03/15/2021    AGRATIO 1.9 08/16/2016    LABGLOM >60 03/15/2021    GLUCOSE 156 03/15/2021    PROT 6.2 03/12/2021    PROT 8.2 02/06/2012    LABALBU 4.3 03/12/2021    CALCIUM 8.7 03/15/2021    BILITOT 0.7 03/12/2021    ALKPHOS 40 03/12/2021    AST 25 03/12/2021    ALT 19 03/12/2021     Xr Hip Right (2-3 Views)    Result Date: 3/12/2021  EXAMINATION: TWO XRAY VIEWS OF THE RIGHT HIP 3/12/2021 1:52 pm COMPARISON: Pelvis/right hip radiograph performed 03/10/2021. HISTORY: ORDERING SYSTEM PROVIDED HISTORY: trauma TECHNOLOGIST PROVIDED HISTORY: Reason for exam:->trauma Reason for Exam: back pain FINDINGS: No acute osseous abnormality demonstrated. The right hip joint is anatomically aligned without significant degenerative changes. Few scattered surgical clips noted within the pelvis. No focal soft tissue abnormality. No acute abnormality of the pelvis or right hip. Ct Lumbar Spine Wo Contrast    Result Date: 3/12/2021  EXAMINATION: CT OF THE LUMBAR SPINE WITHOUT CONTRAST  3/12/2021 TECHNIQUE: CT of the lumbar spine was performed without the administration of intravenous contrast. Multiplanar reformatted images are provided for review. Dose modulation, iterative reconstruction, and/or weight based adjustment of the mA/kV was utilized to reduce the radiation dose to as low as reasonably achievable. COMPARISON: Lumbar spine radiograph performed March 10, 2021. HISTORY: ORDERING SYSTEM PROVIDED HISTORY: trauma TECHNOLOGIST PROVIDED HISTORY: Reason for exam:->trauma Decision Support Exception->Emergency Medical Condition (MA) Reason for Exam: CHRONIC BACK PAIN /// RT. LEG NUMBNESS Acuity: Chronic Type of Exam: Initial FINDINGS: BONES/ALIGNMENT: There is normal alignment of the spine. The vertebral body heights are maintained. No osseous destructive lesion is seen. DEGENERATIVE CHANGES: There is diffuse osteopenia. Mild to moderate multilevel degenerative changes, grossly unchanged from prior radiograph. Disc bulge at L3-4 contributes to moderate to severe canal narrowing. L1-2 and L2-3 disc bulge contributes to mild canal narrowing. No severe neural foraminal narrowing.  SOFT TISSUES/RETROPERITONEUM: No paraspinal mass is seen. 1. No acute abnormality of the lumbar spine. 2. L3-4 disc bulge contributes to moderate to severe canal narrowing. Additional degenerative changes, as described above. Mri Lumbar Spine Wo Contrast    Result Date: 3/12/2021  EXAMINATION: MRI OF THE LUMBAR SPINE WITHOUT CONTRAST, 3/12/2021 4:59 pm TECHNIQUE: Multiplanar multisequence MRI of the lumbar spine was performed without the administration of intravenous contrast. COMPARISON: CT earlier today. HISTORY: ORDERING SYSTEM PROVIDED HISTORY: back pain, concern for HNP TECHNOLOGIST PROVIDED HISTORY: Reason for exam:->back pain, concern for HNP Reason for Exam: numbness in legs - went to chiropractor and had severe pain unable to walk FINDINGS: BONES/ALIGNMENT: There is normal alignment of the spine. The vertebral body heights are maintained. The bone marrow signal appears unremarkable. SPINAL CORD: The conus terminates normally at the upper L2 level. The visualized spinal cord has normal signal and morphology. No evidence of mass or abnormal fluid collection within the spinal canal. SOFT TISSUES: There is minimal soft tissue edema about the hypertrophic L3-4 facet joints, likely reactive. No fluid collection or mass is evident. T11-12: Mild disc height loss and desiccation. No neural foraminal narrowing. Moderate spinal canal stenosis secondary to a left posterior paracentral disc protrusion and facet and ligamentum flavum hypertrophy. T12-L1: Mild disc height loss and desiccation. No neural foraminal narrowing. Moderate spinal canal stenosis secondary to a left posterior paracentral disc protrusion. L1-L2: Moderate disc height loss and desiccation. Mild bilateral neural foraminal narrowing secondary to disc bulge. Severe spinal canal stenosis secondary to disc bulge and facet and ligamentum flavum hypertrophy. L2-L3: Mild disc height loss and desiccation.   Moderate left and mild right neural foraminal narrowing secondary to disc bulge and facet hypertrophy. Moderate spinal canal stenosis secondary to disc bulge and facet and ligamentum flavum hypertrophy. L3-L4: Mild disc height loss and desiccation. Mild left and severe right neural foraminal narrowing secondary to disc bulge and facet hypertrophy. Severe spinal canal stenosis secondary to disc bulge and facet and ligamentum flavum hypertrophy. L4-L5: Disc desiccation without height loss. No left neural foraminal narrowing. Mild right neural foraminal narrowing secondary to disc bulge. Moderate spinal canal stenosis secondary to disc bulge and facet and ligamentum flavum hypertrophy. L5-S1: Mild disc height loss and desiccation. Moderate left neural foraminal narrowing secondary to facet hypertrophy. No right neural foraminal narrowing. No spinal canal stenosis. 1. Mild-to-moderate multilevel degenerative disease and facet arthropathy. 2. Spinal canal stenoses, severe at L1-2 and L3-4 and moderate at T11-12, T12-L1, L2-3, and L4-5. 3. Multilevel neural foraminal narrowing as detailed above and greatest involving the right L3 neural foramen where it is severe.        Assessment and Plan:   Wyatt Ledbetter is a 77 y.o.  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  DC if cleared by neurosurgery   MDM [] Low, [x] Moderate,[]  High     Electronically signed by Antwan Ramires MD on 3/17/2021 at 2:07 PM

## 2021-03-17 NOTE — PROGRESS NOTES
[facility] ACUTE CARE OCCUPATIONAL THERAPY EVALUATION    Tennis Bustle, 1945, 3026/3026-A, 3/17/2021    Discharge Recommendation: Inpatient Rehabilitation      History:  [place]:  The encounter diagnosis was Herniated nucleus pulposus, L3-4 right. Subjective:  Patient states: \"I feel so nauseous today! \"  Pain: Pt reported 3/10 surgical pain in low back/side at rest, become more painful upon sitting EOB but did not quantify  Communication with other providers: PT Violet Sol  Restrictions: General Precautions, High Fall Risk, Spinal Precautions, Telemetry, Pulse Ox, BP cuff, IV, Mota, Bed/chair alarm    Home Setup/Prior level of function:  Social/Functional History  Lives With: Spouse  Type of Home: Condo  Home Layout: One level  Home Access: Level entry  Bathroom Shower/Tub: Walk-in shower  Bathroom Equipment: Grab bars in shower  ADL Assistance: Independent  Homemaking Assistance: Independent  Homemaking Responsibilities: Yes  Ambulation Assistance: Independent (uses no AD)  Transfer Assistance: Independent  Active : Yes  Occupation: Retired    Examination:  · Observation: Supine in bed upon arrival. Spouse present and supportive. Agreeable to evaluation.   · Vision: WFL (Glasses)  · Hearing: [employer]  · Vitals: Stable vitals throughout session    Body Systems and functions:  · ROM: WFL all joints in BL UEs  · Strength: 4+/5 MMT all major muscle groups BL UEs  · Sensation: WFL in BL UEs (See PT note for LE assessment)  · Tone: Normal  · Coordination: WNL for pt's age  · Perception: WNL    Activities of Daily Living (ADLs):  · Feeding: Independent (in High-Silverman's position)  · Grooming: CGA (seated facial hygiene task EOB; unable to complete in standing this date)  · UB bathing: CGA   · LB bathing: Max A (reaching distal LEs/buttocks, posterior thighs)  · UB dressing: Min A (dynamic sitting balance with donning robe EOB)  · LB dressing: Dependent (donning BL socks)  · Toileting: Dependent (anticipate total assist required for clothing mgmt both directions and fatoumata care at this time)    Cognitive and Psychosocial Functioning:  · Overall cognitive status: WFL (anxious behaviors and painful this date)  · Affect: Normal     Balance:   · Sitting: CGA static sitting, Min A with dynamic sitting tasks  · Standing: CGA to Mod A with RW for brief static standing (brief episodes of BL knee buckling which required mod A for correction)    Functional Mobility:  · Bed Mobility: Min A rolling both directions (min coaching for bending contralateral knee/reaching for bed rail), Mod A supine to sitting EOB (mod trunk boost required from sidelying), Mod A sitting EOB to supine using reverse log roll technique   · Transfers: Mod A to and from bed (min cues for safe hand placement each direction)  · Ambulation: Mod A with RW 3 ft side-stepping along EOB; unpredictable due to BL knee buckling, limited tolerance this date      AM-PAC 6 click short form for inpatient daily activity:   How much help from another person does the patient currently need. .. Unable  Dep A Lot  Max A A Lot   Mod A A Little  Min A A Little   CGA  SBA None   Mod I  Indep  Sup   1. Putting on and taking off regular lower body clothing? [x] 1    [] 2   [] 2   [] 3   [] 3   [] 4      2. Bathing (including washing, rinsing, drying)? [] 1   [] 2   [x] 2 [] 3 [] 3 [] 4   3. Toileting, which includes using toilet, bedpan, or urinal? [x] 1    [] 2   [] 2   [] 3   [] 3   [] 4     4. Putting on and taking off regular upper body clothing? [] 1   [] 2   [] 2   [x] 3   [] 3    [] 4      5. Taking care of personal grooming such as brushing teeth? [] 1   [] 2    [] 2 [] 3    [x] 3   [] 4      6. Eating meals?    [] 1   [] 2   [] 2   [] 3   [] 3   [x] 4      Raw Score:  14    [24=0% impaired(CH), 23=1-19%(CI), 20-22=20-39%(CJ), 15-19=40-59%(CK), 10-14=60-79%(CL), 7-9=80-99%(CM), 6=100%(CN)]     Treatment:  Therapeutic Activity Training:   Therapeutic activity training was instructed today. Cues were given for safety, sequence, UE/LE placement, awareness, and balance. Activities performed today included bed mobility training using log roll technique, sitting balance/tolerance, transfer training, standing tolerance and side-stepping with RW, education on role of OT, POC, spinal precautions, importance of EOB/OOB activity, d/c planning        Safety Measures: Gait belt used, Left in Bed, Alarm in place    Assessment:  Pt is a 77year old female with a past medical history of Diabetes mellitus with neuropathy ([address] GERD (gastroesophageal reflux disease), Hypertension, Malignant lymphoplasmacytic lymphoma ([facility] [address] RLS (restless legs syndrome), and Uterine cancer ([facility] [address] Pt admitted with an L3-L4 nucleus pulposus herniation and severe spinal stenosis. Pt underwent Lt L3-L4 anterior to psoas discectomy and arthrodesis and posterior arthrodesis on 3-16. Pt lives at home with her spouse and at baseline is fully independent with ADLs, IADLs, mobility, and driving. Pt currently presents with the above impairments, and is not safe for immediate return home based on today's evaluation. Recommend continued OT services in inpatient rehab setting at discharge. Complexity: Moderate  Prognosis: Good  Plan: 4x/week      Goals:  1. Pt will complete all aspects of bed mobility for EOB/OOB ADLs CGA using log roll technique   2. Pt will complete UB/LB bathing min A with setup using long handled sponge   3. Pt will complete all aspects of LB dressing mod A with setup using LB AE  4. Pt will complete all functional transfers to and from bed, chair, toilet, shower chair min A/good safety awareness  5. Pt will ambulate HH distance to bathroom for toileting min A with RW  6. Pt will complete all aspects of toileting task mod A on regular toilet  7. Pt will complete oral hygiene/grooming routine in standing at sink CGA with setup  8.  Pt will verbalize/be compliant with 3/3 spinal precautions 100% of the time      Time:   Time in: 1405  Time out: 1430  Timed treatment minutes: 10  Total time: 25      Electronically signed by:    CINTHIA Kearns, [place], [id number]

## 2021-03-17 NOTE — ANESTHESIA POSTPROCEDURE EVALUATION
Department of Anesthesiology  Postprocedure Note    Patient: Digna Graham  MRN: [id number]  YOB: 1945  Date of evaluation: 3/16/2021  Time:  11:56 PM     Procedure Summary     Date: 03/16/21 Room / Location: [address] / [facility]    Anesthesia Start: 1335 Anesthesia Stop: 6995    Procedure: MINIMALLY INVASIVE LEFT L3-4 ANTERIOR TO PSOAS DISECTOMY AND ARTHODESIS TITANIUM INTERBODY DEVICE , MORSELIZD SYNTHETIC ALLOGRAFT POSTERIOR L3-4 INTSTRUMENTATION AND FUSION , INTROPERATIVE FLUROSCOPY AND NEURO MONITORING (N/A Spine Cervical) Diagnosis: (INTRACTABLE BACK PAIN)    Surgeons: Arielle Jacobson MD Responsible Provider: Joseph Yanez MD    Anesthesia Type: general ASA Status: 3          Anesthesia Type: general    Karely Phase I: Karely Score: 10    Karely Phase II:      Last vitals: Reviewed and per EMR flowsheets.        Anesthesia Post Evaluation    Patient location during evaluation: PACU  Patient participation: complete - patient participated  Level of consciousness: awake and alert  Pain score: 0  Airway patency: patent  Nausea & Vomiting: no nausea and no vomiting  Complications: no  Cardiovascular status: blood pressure returned to baseline  Respiratory status: acceptable  Hydration status: euvolemic

## 2021-03-17 NOTE — PLAN OF CARE
Problem: Pain:  Goal: Pain level will decrease  Description: Pain level will decrease  3/17/2021 0953 by Aniya Norris RN  Outcome: Ongoing  3/17/2021 0112 by Kenyetta Lozano RN  Outcome: Ongoing  Goal: Control of acute pain  Description: Control of acute pain  3/17/2021 0953 by Aniya Norris RN  Outcome: Ongoing  3/17/2021 0112 by Kenyetta Lozano RN  Outcome: Ongoing  Goal: Control of chronic pain  Description: Control of chronic pain  3/17/2021 0953 by Aniya Norris RN  Outcome: Ongoing  3/17/2021 0112 by Kenyetta Lozano RN  Outcome: Ongoing     Problem: Falls - Risk of:  Goal: Will remain free from falls  Description: Will remain free from falls  3/17/2021 0953 by Aniya Norris RN  Outcome: Ongoing  3/17/2021 0112 by Kenyetta Lozano RN  Outcome: Ongoing  Goal: Absence of physical injury  Description: Absence of physical injury  3/17/2021 0953 by Aniya Norris RN  Outcome: Ongoing  3/17/2021 0112 by Kenyetta Lozano RN  Outcome: Ongoing     Problem: Skin Integrity:  Goal: Will show no infection signs and symptoms  Description: Will show no infection signs and symptoms  3/17/2021 0953 by Aniya Norris RN  Outcome: Ongoing  3/17/2021 0112 by Kenyetta Lozano RN  Outcome: Ongoing  Goal: Absence of new skin breakdown  Description: Absence of new skin breakdown  3/17/2021 0953 by Aniya Norris RN  Outcome: Ongoing  3/17/2021 0112 by Kenyetta Lozano RN  Outcome: Ongoing  Goal: Risk for impaired skin integrity will decrease  Description: Risk for impaired skin integrity will decrease  3/17/2021 0953 by nAiya Norris RN  Outcome: Ongoing  3/17/2021 0112 by Kenyetta Lozano RN  Outcome: Ongoing     Problem:  Activity:  Goal: Ability to avoid complications of mobility impairment will improve  Description: Ability to avoid complications of mobility impairment will improve  3/17/2021 0953 by Aniya Norris RN  Outcome: Ongoing  3/17/2021 0112 by clinical measurements within normal limits will improve  3/17/2021 0953 by Mariama Salter RN  Outcome: Ongoing  3/17/2021 0112 by Milady Hudson RN  Outcome: Ongoing  Goal: Will remain free from infection  Description: Will remain free from infection  3/17/2021 0953 by Mariama Salter RN  Outcome: Ongoing  3/17/2021 0112 by Milady Hudson RN  Outcome: Ongoing     Problem: Respiratory:  Goal: Respiratory status will improve  Description: Respiratory status will improve  3/17/2021 0953 by Mariama Salter RN  Outcome: Ongoing  3/17/2021 0112 by Milady Hudson RN  Outcome: Ongoing     Problem: Sensory:  Goal: Pain level will decrease  Description: Pain level will decrease  3/17/2021 0953 by Mariama Salter RN  Outcome: Ongoing  3/17/2021 0112 by Milady Hudson RN  Outcome: Ongoing  Goal: Satisfaction with pain management regimen will improve  Description: Satisfaction with pain management regimen will improve  3/17/2021 0953 by Mariama Salter RN  Outcome: Ongoing  3/17/2021 0112 by Milady Hudson RN  Outcome: Ongoing     Problem: Urinary Elimination:  Goal: Ability to achieve and maintain adequate urine output will improve  Description: Ability to achieve and maintain adequate urine output will improve  3/17/2021 0953 by Mariama Salter RN  Outcome: Ongoing  3/17/2021 0112 by Milady Hudson RN  Outcome: Ongoing

## 2021-03-17 NOTE — CONSULTS
[address]ane Mendez [address], 1945, 3026/3026-A, 3/17/2021    History  [place]:  The encounter diagnosis was Herniated nucleus pulposus, L3-4 right. Patient  has a past medical history of Diabetes mellitus with neuropathy ([facility] [address] GERD (gastroesophageal reflux disease), Hypertension, Malignant lymphoplasmacytic lymphoma ([facility] [address] RLS (restless legs syndrome), and Uterine cancer ([facility] [address] Patient  has a past surgical history that includes Hysterectomy; Tubal ligation; and Hand surgery (12/2016). Subjective:  Patient states:  \"I didn't know recovery was going to be this far. \"    Pain:  3/10 pain at sx site. Communication with other providers:  Handoff to RN, OT  Restrictions: spinal precautions, fall risk, general precautions    Home Setup/Prior level of function  Social/Functional History  Lives With: Spouse  Type of Home: (Condo)  Home Layout: One level  Home Access: Level entry  Bathroom Shower/Tub: Walk-in shower  Bathroom Equipment: Grab bars in shower  ADL Assistance: [address] [place] [address]: Independent  Homemaking Responsibilities: Yes  Ambulation Assistance: Independent  Transfer Assistance: Independent  Active : Yes    Examination of body systems (includes body structures/functions, activity/participation limitations):  · Observation:  Pt supine in bed with  in room upon arrival and agreeable to therapy  · Vision:  [facility]  · Hearing:  [facility]  · Cardiopulmonary:  No O2 needs  · Cognition: WFL, see OT/SLP note for further evaluation. Musculoskeletal  · ROM R/L:  WFL. · Strength R/L:  4/5, moderate impairment in function and endurance. · Neuro:  Sensation intact, L knee kendal in [address]- could be from pain      Mobility:  · Rolling L/R:  Min A bilaterally with cues for sequencing  · Supine to sit:  Mod A with assist at trunk and LEs with increased time and effort to complete.  Pt educated on log roll technique  · Transfers: Pt

## 2021-03-17 NOTE — PROGRESS NOTES
Pt requesting SCDs be taken off at this time as they are keeping her awake each time they turn on. Pt provided education on their purpose and importance but requesting they be removed at this time.

## 2021-03-18 VITALS
HEART RATE: 78 BPM | RESPIRATION RATE: 17 BRPM | SYSTOLIC BLOOD PRESSURE: 144 MMHG | TEMPERATURE: 98.2 F | HEIGHT: 61 IN | OXYGEN SATURATION: 97 % | DIASTOLIC BLOOD PRESSURE: 52 MMHG | WEIGHT: 153 LBS | BODY MASS INDEX: 28.89 KG/M2

## 2021-03-18 LAB
GLUCOSE BLD-MCNC: 134 MG/DL (ref 70–99)
GLUCOSE BLD-MCNC: 171 MG/DL (ref 70–99)

## 2021-03-18 PROCEDURE — 82962 GLUCOSE BLOOD TEST: CPT

## 2021-03-18 PROCEDURE — 6370000000 HC RX 637 (ALT 250 FOR IP): Performed by: NURSE PRACTITIONER

## 2021-03-18 PROCEDURE — 6360000002 HC RX W HCPCS: Performed by: INTERNAL MEDICINE

## 2021-03-18 PROCEDURE — 6370000000 HC RX 637 (ALT 250 FOR IP): Performed by: HOSPITALIST

## 2021-03-18 PROCEDURE — 97530 THERAPEUTIC ACTIVITIES: CPT

## 2021-03-18 PROCEDURE — 6370000000 HC RX 637 (ALT 250 FOR IP): Performed by: NEUROLOGICAL SURGERY

## 2021-03-18 PROCEDURE — 97535 SELF CARE MNGMENT TRAINING: CPT

## 2021-03-18 PROCEDURE — 6360000002 HC RX W HCPCS: Performed by: NURSE PRACTITIONER

## 2021-03-18 RX ORDER — HEPARIN SODIUM (PORCINE) LOCK FLUSH IV SOLN 100 UNIT/ML 100 UNIT/ML
500 SOLUTION INTRAVENOUS PRN
Status: DISCONTINUED | OUTPATIENT
Start: 2021-03-18 | End: 2021-03-18 | Stop reason: HOSPADM

## 2021-03-18 RX ADMIN — ROPINIROLE HYDROCHLORIDE 2 MG: 1 TABLET, FILM COATED ORAL at 14:05

## 2021-03-18 RX ADMIN — TRAMADOL HYDROCHLORIDE 25 MG: 50 TABLET, FILM COATED ORAL at 08:51

## 2021-03-18 RX ADMIN — GABAPENTIN 400 MG: 400 CAPSULE ORAL at 14:05

## 2021-03-18 RX ADMIN — ACETAMINOPHEN 650 MG: 325 TABLET ORAL at 10:54

## 2021-03-18 RX ADMIN — ROPINIROLE HYDROCHLORIDE 2 MG: 1 TABLET, FILM COATED ORAL at 08:53

## 2021-03-18 RX ADMIN — MULTIPLE VITAMINS W/ MINERALS TAB 1 TABLET: TAB at 08:52

## 2021-03-18 RX ADMIN — POLYVINYL ALCOHOL 1 DROP: 14 SOLUTION/ DROPS OPHTHALMIC at 09:10

## 2021-03-18 RX ADMIN — VALSARTAN AND HYDROCHLOROTHIAZIDE 1 TABLET: 160; 12.5 TABLET, FILM COATED ORAL at 08:53

## 2021-03-18 RX ADMIN — INSULIN GLARGINE 40 UNITS: 100 INJECTION, SOLUTION SUBCUTANEOUS at 09:05

## 2021-03-18 RX ADMIN — HEPARIN 500 UNITS: 100 SYRINGE at 15:41

## 2021-03-18 RX ADMIN — INSULIN LISPRO 1 UNITS: 100 INJECTION, SOLUTION INTRAVENOUS; SUBCUTANEOUS at 12:01

## 2021-03-18 RX ADMIN — Medication 500 MG: at 09:12

## 2021-03-18 RX ADMIN — METHOCARBAMOL TABLETS 750 MG: 750 TABLET, COATED ORAL at 08:52

## 2021-03-18 RX ADMIN — METHOCARBAMOL TABLETS 750 MG: 750 TABLET, COATED ORAL at 14:05

## 2021-03-18 RX ADMIN — GABAPENTIN 400 MG: 400 CAPSULE ORAL at 08:53

## 2021-03-18 RX ADMIN — ENOXAPARIN SODIUM 30 MG: 30 INJECTION SUBCUTANEOUS at 08:52

## 2021-03-18 ASSESSMENT — PAIN SCALES - GENERAL
PAINLEVEL_OUTOF10: 6
PAINLEVEL_OUTOF10: 4
PAINLEVEL_OUTOF10: 3

## 2021-03-18 ASSESSMENT — PAIN - FUNCTIONAL ASSESSMENT: PAIN_FUNCTIONAL_ASSESSMENT: ACTIVITIES ARE NOT PREVENTED

## 2021-03-18 ASSESSMENT — PAIN DESCRIPTION - PAIN TYPE
TYPE: SURGICAL PAIN
TYPE: SURGICAL PAIN

## 2021-03-18 ASSESSMENT — PAIN DESCRIPTION - ONSET: ONSET: ON-GOING

## 2021-03-18 NOTE — CARE COORDINATION
3/18/2021 @ 4:35 PM. LSW received a call from [facility] Who informed this LSw that the pt who was discharged today was at [facility] and they have not received pt orders or face to face for a walker. LSW PS Dr. Ortiz Ballard and asked him for the order and to place a note in his progress note with the following informing. Sherrye Homans was evaluated today and a DME order was entered for a wheeled walker because she requires this to successfully complete daily living tasks of eating, bathing, toileting, personal cares, ambulating, grooming, hygiene and meal preparation. A wheeled walker is necessary due to the patient's unsteady gait, upper body weakness, and inability to  an ambulation device; and she can ambulate only by pushing a walker instead of a lesser assistive device such as a cane, crutch, or standard walker. The need for this equipment was discussed with the patient and she understands and is in agreement. Doctor stated he will put the order in and place the note in his discharge summary. LSW called [facility] and informed them that this LSW will fax them the information as soon as the doctor finishes his note. LSW was informed that they will be closing in ten minutes and will not be able to have this ready for today. Staff stated they will go to the pt car and inform them that the walker will be ready tomorrow and they will ask if they want the walker to be delivered or if they will pick it up. LSW faxed DME order, ischarge summary with the face to face and Face sheet to [facility].

## 2021-03-18 NOTE — PROGRESS NOTES
Physical Therapy    Physical Therapy Treatment Note  Name: Jill Pinzon MRN: [id number] :   1945   Date:  3/18/2021   Admission Date: 3/12/2021 Room:  [address] [place] [address]   Restrictions/Precautions:        ***  Communication with other providers:  ***  Subjective:  Patient states:  ***  Pain:   Location, Type, Intensity (0/10 to 10/10):  ***  Objective:    Observation:  ***  Treatment, including education/measures:  ***  Assessment / Impression:       Patient's tolerance of treatment:  ***   Adverse Reaction: ***  Significant change in status and impact:  ***  Barriers to improvement:  ***  Plan for Next Session:    ***  Time in:  ***  Time out:  ***  Timed treatment minutes:  ***  Total treatment time:  ***    Previously filed items:  Social/Functional History  Lives With: Spouse  Type of Home: (Condo)  Home Layout: One level  Home Access: Level entry  Bathroom Shower/Tub: Walk-in shower  Bathroom Equipment: Grab bars in shower  ADL Assistance: [address] [place] [address] Avenue: [address] [place] [address] Responsibilities: Yes  Ambulation Assistance: Independent  Transfer Assistance: Independent  Active : Yes  Short term goals  Time Frame for Short term goals: 1 week  Short term goal 1: Pt to complete all bed mobility mod I  Short term goal 2: Pt to complete all STS transfers to/from bed, commode, and chair mod I  Short term goal 3: Pt to ambulate 48' with LRAD and supervision       Electronically signed by:    Ariane Peck PTA  3/18/2021, 8:27 AM

## 2021-03-18 NOTE — PROGRESS NOTES
77 y.o. female who is postoperative day #2 after left L3-4 anterior to psoas fusion. Subjective:  Preoperative right lower extremity pain has resolved. Abdominal discomfort has resolved. She ambulated in the hallway today. She is tolerating regular diet. Objective:  Vitals:    03/17/21 0900 03/17/21 2108 03/18/21 0351 03/18/21 0830   BP: (!) 168/65 (!) 147/66 (!) 156/71 (!) 146/67   Pulse: 98 96 92 90   Resp: 15 18 16 17   Temp: 98.3 °F (36.8 °C) 98 °F (36.7 °C) 98.2 °F (36.8 °C) 98.2 °F (36.8 °C)   TempSrc: Oral Oral Oral Axillary   SpO2: 100% 98% 99%    Weight:       Height:          Output by Drain (mL) 03/16/21 0701 - 03/16/21 1500 03/16/21 1501 - 03/16/21 2300 03/16/21 2301 - 03/17/21 0700 03/17/21 0701 - 03/17/21 1500 03/17/21 1501 - 03/17/21 2300 03/17/21 2301 - 03/18/21 0700 03/18/21 0701 - 03/18/21 1311   Requested LDAs do not have output data documented. Intake/Output Summary (Last 24 hours) at 3/18/2021 1311  Last data filed at 3/18/2021 0351  Gross per 24 hour   Intake --   Output 1700 ml   Net -1700 ml       Neurological Exam:  Awake, alert. No acute distress. Motor 5/5 bilateral upper and lower extremities. Sensation intact to light touch. Abdominal and lumbar incisions clean, dry and intact. Lab Results   Component Value Date    WBC 6.3 03/17/2021    HGB 11.8 (L) 03/17/2021    HCT 34.4 (L) 03/17/2021    MCV 98.0 03/17/2021     03/17/2021       Lab Results   Component Value Date     03/15/2021    K 4.0 03/15/2021    CL 95 03/15/2021    CO2 16 03/15/2021    BUN 22 03/15/2021    CREATININE 0.9 03/15/2021    GLUCOSE 156 03/15/2021    CALCIUM 8.7 03/15/2021        Lab Results   Component Value Date    INR 1.07 03/15/2021    PROTIME 13.0 03/15/2021       Lab Results   Component Value Date    APTT 31.8 08/17/2015       Imaging:  I have personally reviewed the images and radiology report from postoperative x-rays showing no hardware complication. Impression:   The

## 2021-03-18 NOTE — PROGRESS NOTES
Occupational Therapy      Occupational Therapy Treatment Note    Name: Sofie Zaragoza MRN: [id number] :   1945   Date:  3/18/2021   Admission Date: 3/12/2021 Room:  [facility]     Primary Problem: L3-L4 nucleus pulposus herniation, Severe spinal stenosis s/p Lt L3-L4 anterior to psoas discectomy and arthrodesis & posterior arthrodesis    Restrictions/Precautions: General Precautions, High Fall Risk, Spinal Precautions, Telemetry, Pulse Ox, BP cuff, IV, Mota, Bed/chair alarm    Communication with other providers: ERAN Simpson    Subjective:  Patient states: \"I feel a lot better today! They got me up to the chair bright and early! \"  Pain: Pt reported 2/10 surgical pain    Objective:    Observation: Pt received sitting in chair upon OT arrival. Spouse present and supportive. Pt agreeable to treatment. Objective Measures: Stable vitals throughout session, A & O x 4    Treatment, including education:  Therapeutic Activity Training:   Therapeutic activity training was instructed today. Cues were given for safety, sequence, UE/LE placement, awareness, and balance. Self Care Training:   Cues were given for safety, sequence, UE/LE placement, visual cues, and balance. Pt received sitting in chair upon OT arrival. Pt re-educated on role of OT, POC, and spinal precautions. Pt completed sit to stand transfer from chair CGA with min cues for safe hand placement. Pt ambulated household distance to bathroom sink CGA with RW. Pt did have an episode of BL knee buckling requiring min A for postural correction. Pt stood at sink x 8 minutes and completed oral hygiene tasks of brushing/rinsing, facial hygiene, and grooming task of brushing hair CGA with min cues for safe RW placement. Pt required a few brief standing rest breaks while at sink and pt rested elbows on countertop intermittently. Following ADLs at sink, pt ambulated approximately 20 ft in room CGA with RW.  Pt had another episode of BL knee buckling and required min A for postural correction. Pt transferred stand to sit to chair CGA with min cues for reaching back with arms. Pt re-educated on spinal precautions and modifying LB ADLs. Pt provided LB AE including reacher, sock aid, long handled sponge, and long handled shoe horn. Pt educated on use of equipment and trialed reacher and sock aid. Pt able to effectively use reacher to  item from floor and doff socks using device. Pt donned BL socks using sock aid SBA with min coaching. Pt and spouse educated on safety strategies in home and initial use of gait belt at home when pt transfers/ambulates for the first few days, as pt did have knee buckling this date. Pt and spouse voiced understanding. Pt left positioned for comfort in chair with all lines intact, all needs within reach, and chair alarm on. Assessment / Impression:     Patient's tolerance of treatment: Well  Adverse Reaction: 2 episodes of mild knee buckling (educated spouse on initial use of gait belt at home)  Significant change in status and impact: Much improved overall performance relative to evaluation yesterday  Barriers to improvement: None noted      Plan for Next Session:    Continue per OT POC. Per charting and Dr. Parveen Cordon, pt discharging home today. Pt did perform much better this date, educated spouse on initial use of gait belt for pt at home for safety.       Time in: 943  Time out: 1022  Timed treatment minutes: 39  Total treatment time: 39      Electronically signed by:    ABHILASH Chiang/L, [address] [place] [phone]

## 2021-03-18 NOTE — CARE COORDINATION
CM in to see Pt to follow up on discharge planning. Pt plans home with HC. PS to Dr. Monisha Munoz to update. PS to [facility] for referral.  CM following    Kitty Shed was evaluated today and a DME order was entered for a wheeled walker because she requires this to successfully complete daily living tasks of eating, bathing, toileting, personal cares, ambulating, grooming, hygiene and meal preparation. A wheeled walker is necessary due to the patient's unsteady gait, upper body weakness, and inability to  an ambulation device; and she can ambulate only by pushing a walker instead of a lesser assistive device such as a cane, crutch, or standard walker. The need for this equipment was discussed with the patient and she understands and is in agreement.

## 2021-03-18 NOTE — PROGRESS NOTES
At this time pt states her blood sugar has been elevated since she has been here and the SSI ordered is not giving her much coverage. Pt states at home she takes 20 units of novolog at meal times and gets a dose of tresiba. SHAYNE LOVE notified of pt request, no new orders at this time, states increasing SSI will be left to the discretion of dayshift. This RN will notify day RN in report. Will continue to monitor.

## 2021-03-18 NOTE — DISCHARGE SUMMARY
Discharge Summary Note  Patient ID:  Rafael Maloney  [id number]  77 y.o.  1945    Admit date: 3/12/2021    Discharge date and time: 3/18/2021  4:09 PM     Admitting Physician: Franco Child MD     Discharge Physician: Umm Carvalho MD    Admission Diagnoses:   Herniated nucleus pulposus, L3-4 right [M51.26]  Intractable back pain [M54.9]    Discharge Diagnoses and Hospital Course:   Rafael Maloney is a 77 y.o.  female  who presents with Intractable back pain       Back pain with right L3/L4 radiculopathy  Neurogenic claudication   Severe spinal stenosis of L3-4, also right foraminal stenosis at the same level  S/p discectomy and arthrodesis   Neurosurgery recommendations appreciated-Okay to DC from their stand point  Pain controlled well  Bowel regimen given for constipation  PT/OT said patient mobility has improved and patient wanted to go home, hence being discharged to home with [place] [id number]     Type 2 DM  Continue with home regimen     HTN  Continue ARB and Thiazide     HLD  Hyponatremia, mild-Resolved     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 A wheeled walker is necessary due to the patient's unsteady gait, upper body weakness, and inability to  an ambulation device; and she can ambulate only by pushing a walker instead of a lesser assistive device such as a cane, crutch, or standard walker. The need for this equipment was discussed with the patient and she understands and is in agreement.     Admission Condition: fair    Discharged Condition: stable    Consults: rehabilitation medicine and neurosurgery    Significant Diagnostic Studies:   CBC with Differential:    Lab Results   Component Value Date    WBC 6.3 03/17/2021    RBC 3.51 03/17/2021    HGB 11.8 03/17/2021    HCT 34.4 03/17/2021     03/17/2021    MCV 98.0 03/17/2021    MCH 33.6 03/17/2021    MCHC 34.3 03/17/2021    RDW 13.2 03/17/2021    SEGSPCT 76.6 03/12/2021    BANDSPCT 6 07/23/2015    LYMPHOPCT 15.6 03/12/2021    MONOPCT 6.2 03/12/2021    EOSPCT 1.7 02/09/2017    BASOPCT 0.3 03/12/2021    MONOSABS 0.4 03/12/2021    LYMPHSABS 1.1 03/12/2021    EOSABS 0.1 03/12/2021    BASOSABS 0.0 03/12/2021    DIFFTYPE AUTOMATED DIFFERENTIAL 03/12/2021     CMP:    Lab Results   Component Value Date     03/15/2021    K 4.0 03/15/2021    CL 95 03/15/2021    CO2 16 03/15/2021    BUN 22 03/15/2021    CREATININE 0.9 03/15/2021    GFRAA >60 03/15/2021    AGRATIO 1.9 08/16/2016    LABGLOM >60 03/15/2021    GLUCOSE 156 03/15/2021    PROT 6.2 03/12/2021    PROT 8.2 02/06/2012    LABALBU 4.3 03/12/2021    CALCIUM 8.7 03/15/2021    BILITOT 0.7 03/12/2021    ALKPHOS 40 03/12/2021    AST 25 03/12/2021    ALT 19 03/12/2021     Xr Hip Right (2-3 Views)    Result Date: 3/12/2021  EXAMINATION: TWO XRAY VIEWS OF THE RIGHT HIP 3/12/2021 1:52 pm COMPARISON: Pelvis/right hip radiograph performed 03/10/2021. HISTORY: ORDERING SYSTEM PROVIDED HISTORY: trauma TECHNOLOGIST PROVIDED HISTORY: Reason for exam:->trauma Reason for Exam: back pain FINDINGS: No acute osseous abnormality demonstrated. The right hip joint is anatomically aligned without significant degenerative changes. Few scattered surgical clips noted within the pelvis. No focal soft tissue abnormality. No acute abnormality of the pelvis or right hip. Ct Lumbar Spine Wo Contrast    Result Date: 3/12/2021  EXAMINATION: CT OF THE LUMBAR SPINE WITHOUT CONTRAST  3/12/2021 TECHNIQUE: CT of the lumbar spine was performed without the administration of intravenous contrast. Multiplanar reformatted images are provided for review.  Dose modulation, iterative reconstruction, and/or weight based adjustment of the mA/kV was utilized to reduce the radiation dose to as low as reasonably achievable. COMPARISON: Lumbar spine radiograph performed March 10, 2021. HISTORY: ORDERING SYSTEM PROVIDED HISTORY: trauma TECHNOLOGIST PROVIDED HISTORY: Reason for exam:->trauma Decision Support Exception->Emergency Medical Condition (MA) Reason for Exam: CHRONIC BACK PAIN /// RT. LEG NUMBNESS Acuity: Chronic Type of Exam: Initial FINDINGS: BONES/ALIGNMENT: There is normal alignment of the spine. The vertebral body heights are maintained. No osseous destructive lesion is seen. DEGENERATIVE CHANGES: There is diffuse osteopenia. Mild to moderate multilevel degenerative changes, grossly unchanged from prior radiograph. Disc bulge at L3-4 contributes to moderate to severe canal narrowing. L1-2 and L2-3 disc bulge contributes to mild canal narrowing. No severe neural foraminal narrowing. SOFT TISSUES/RETROPERITONEUM: No paraspinal mass is seen. 1. No acute abnormality of the lumbar spine. 2. L3-4 disc bulge contributes to moderate to severe canal narrowing. Additional degenerative changes, as described above. Mri Lumbar Spine Wo Contrast    Result Date: 3/12/2021  EXAMINATION: MRI OF THE LUMBAR SPINE WITHOUT CONTRAST, 3/12/2021 4:59 pm TECHNIQUE: Multiplanar multisequence MRI of the lumbar spine was performed without the administration of intravenous contrast. COMPARISON: CT earlier today. HISTORY: ORDERING SYSTEM PROVIDED HISTORY: back pain, concern for HNP TECHNOLOGIST PROVIDED HISTORY: Reason for exam:->back pain, concern for HNP Reason for Exam: numbness in legs - went to chiropractor and had severe pain unable to walk FINDINGS: BONES/ALIGNMENT: There is normal alignment of the spine. The vertebral body heights are maintained. The bone marrow signal appears unremarkable. SPINAL CORD: The conus terminates normally at the upper L2 level.   The visualized spinal cord has normal signal and morphology. No evidence of mass or abnormal fluid collection within the spinal canal. SOFT TISSUES: There is minimal soft tissue edema about the hypertrophic L3-4 facet joints, likely reactive. No fluid collection or mass is evident. T11-12: Mild disc height loss and desiccation. No neural foraminal narrowing. Moderate spinal canal stenosis secondary to a left posterior paracentral disc protrusion and facet and ligamentum flavum hypertrophy. T12-L1: Mild disc height loss and desiccation. No neural foraminal narrowing. Moderate spinal canal stenosis secondary to a left posterior paracentral disc protrusion. L1-L2: Moderate disc height loss and desiccation. Mild bilateral neural foraminal narrowing secondary to disc bulge. Severe spinal canal stenosis secondary to disc bulge and facet and ligamentum flavum hypertrophy. L2-L3: Mild disc height loss and desiccation. Moderate left and mild right neural foraminal narrowing secondary to disc bulge and facet hypertrophy. Moderate spinal canal stenosis secondary to disc bulge and facet and ligamentum flavum hypertrophy. L3-L4: Mild disc height loss and desiccation. Mild left and severe right neural foraminal narrowing secondary to disc bulge and facet hypertrophy. Severe spinal canal stenosis secondary to disc bulge and facet and ligamentum flavum hypertrophy. L4-L5: Disc desiccation without height loss. No left neural foraminal narrowing. Mild right neural foraminal narrowing secondary to disc bulge. Moderate spinal canal stenosis secondary to disc bulge and facet and ligamentum flavum hypertrophy. L5-S1: Mild disc height loss and desiccation. Moderate left neural foraminal narrowing secondary to facet hypertrophy. No right neural foraminal narrowing. No spinal canal stenosis. 1. Mild-to-moderate multilevel degenerative disease and facet arthropathy.  2. Spinal canal stenoses, severe at L1-2 and L3-4 and moderate at T11-12, with current Glucometer:   DX: diabetes type .00             Glucose Blood (BLOOD GLUCOSE TEST STRIPS) STRP  Check BS 4x a day             insulin aspart (NOVOLOG FLEXPEN) 100 UNIT/ML injection pen  Inject 20 Units into the skin 3 times daily (before meals)             Insulin Degludec (TRESIBA FLEXTOUCH) 200 UNIT/ML SOPN  Inject 20 Units into the skin three times daily             Insulin Pen Needle (PEN NEEDLES) 31G X 8 MM MISC  1 pen by Does not apply route 2 times daily             Lancets MISC  Use 1 lancet 3 times daily             lidocaine (LIDODERM) 5 %  Place 1 patch onto the skin daily for 10 days 12 hours on, 12 hours off.             methocarbamol (ROBAXIN) 500 MG tablet  Take 1 tablet by mouth 4 times daily as needed (pain and/or spasms)             Multiple Vitamins-Minerals (THERAPEUTIC MULTIVITAMIN-MINERALS) tablet  Take 1 tablet by mouth daily             omeprazole (PRILOSEC) 20 MG delayed release capsule  Take 20 mg by mouth daily OTC             ONE TOUCH ULTRA TEST strip  TO CHECK BLOOD SUGAR 4X DAILY WITH CURRENT GLUCOMETER: DX: DIABETES TYPE II E11.9             pimecrolimus (ELIDEL) 1 % cream  Apply topically 2 times daily Apply topically 2 times daily.              rOPINIRole (REQUIP) 2 MG tablet  Take 1 tablet by mouth 2 times daily             simvastatin (ZOCOR) 5 MG tablet  Take 5 mg by mouth nightly              valsartan-hydroCHLOROthiazide (DIOVAN-HCT) 160-12.5 MG per tablet  Take 1 tablet by mouth daily                 Activity: activity as tolerated    Diet: regular diet    Wound Care: keep wound clean and dry    Follow-up:  Dr Kelli Box in 1 week  PCP 1-2 weeks    Time Spent Doing discharge 34 min  Electronically signed by Beba Muñoz MD  on 3/18/21 at 3:05 PM EDT

## 2021-03-18 NOTE — PLAN OF CARE
symptoms to report to health care provider will improve  Outcome: Ongoing     Problem: Nutritional:  Goal: Ability to attain and maintain optimal nutritional status will improve  Description: Ability to attain and maintain optimal nutritional status will improve  Outcome: Ongoing     Problem: Physical Regulation:  Goal: Ability to maintain clinical measurements within normal limits will improve  Description: Ability to maintain clinical measurements within normal limits will improve  Outcome: Ongoing  Goal: Will remain free from infection  Description: Will remain free from infection  Outcome: Ongoing     Problem: Respiratory:  Goal: Respiratory status will improve  Description: Respiratory status will improve  Outcome: Ongoing     Problem: Sensory:  Goal: Pain level will decrease  Description: Pain level will decrease  Outcome: Ongoing  Goal: Satisfaction with pain management regimen will improve  Description: Satisfaction with pain management regimen will improve  Outcome: Ongoing     Problem: Urinary Elimination:  Goal: Ability to achieve and maintain adequate urine output will improve  Description: Ability to achieve and maintain adequate urine output will improve  Outcome: Ongoing

## 2021-03-19 ENCOUNTER — CARE COORDINATION (OUTPATIENT)
Dept: CASE MANAGEMENT | Age: 76
End: 2021-03-19

## 2021-03-19 LAB
EKG ATRIAL RATE: 85 BPM
EKG DIAGNOSIS: NORMAL
EKG P AXIS: 85 DEGREES
EKG P-R INTERVAL: 148 MS
EKG Q-T INTERVAL: 376 MS
EKG QRS DURATION: 68 MS
EKG QTC CALCULATION (BAZETT): 447 MS
EKG R AXIS: 23 DEGREES
EKG T AXIS: 49 DEGREES
EKG VENTRICULAR RATE: 85 BPM

## 2021-03-19 NOTE — CARE COORDINATION
[facility] Transitions Initial Follow Up Call    Call within 2 business days of discharge: Yes    Patient: Ekta England Patient : 1945   MRN: [id number]  Reason for Admission:   Herniated nucleus pulposus  Discharge Date: 3/18/21 RARS: Readmission Risk Score: 22      Last Discharge [address] [address]       Complaint Diagnosis Description Type Department Provider    3/12/21 Back Pain Herniated nucleus pulposus, L3-4 right . .. ED to Hosp-Admission (Discharged) (ADMITTED) Sherine Bhagat MD; Stephenie Holt. .. Spoke with:   Patient's daughter ( currently with patient)    Patient contacted regarding COVID-19 Risk. COVID-19:  Not detected 3/15/21    CTN contacted the patient by telephone to perform post discharge assessment. Call within 2 business days of discharge;  Yes. Verified name and  with patient as identifiers. Provided introduction to self, and explanation of the CTN role, and reason for call due to risk factors for infection and/or exposure to COVID-19. Symptoms reviewed with patient's daughter who verbalized the following symptoms: back pain, fatigue, general weakness. Reports that patient is feeling better than she did in the hospital.  Pain level has improved; denies muscle spasms to legs. Patient's daughter confirms plan for walker to be delivered today. Reports that she contacted patient's PCP to request BSC and toilet seat riser. Instructed on fall prevention, safety with ambulation. Patient's daughter reports that patient's incision is healing well. Denies erythema, swelling or drainage. Encouraged proper hand hygiene. Instructed on s/s surgical site infection to report to MD.    Confirmed plan for support per [facility]. Reminded of the  availability of a José Miguel Rolon RN on call for condition changes. Confirmed contact information for [facility]. Informed of CTN plan to follow up to confirm services.       Due to no new onset of symptoms encounter was not routed to provider for escalation. Discussed follow-up appointments. If no appointment was previously scheduled, appointment scheduling offered:  Yes. Patient's daughter confirms [facility] follow up appointment(s):   Future Appointments   Date Time Provider Ileana Keller   7/19/2021  1:30 PM SCHEDULE, [facility] MED ONC INJECTION [facility] ONC [place]   7/27/2021  9:45 AM [facility], MED ONC NURSE [facility] ON[facility] [place]   7/27/2021 10:00 AM Vicky Cpoe MD [facility] CC [facility]     Non[facility] follow up appointment(s): 3/22/21 Dr. Ronald Au:   Does patient have an Advance Directive:  yes. Patient has following risk factors of:Malignant Lymphoplastic lymphoma . CTN reviewed discharge instructions, medical action plan and red flags such as increased shortness of breath, increasing fever and signs of decompensation with patient who verbalized understanding. Discussed exposure protocols and quarantine with CDC Guidelines What to do if you are sick with coronavirus disease 2019.  Patient was given an opportunity for questions and concerns. The patient agrees to contact the [employer] exposure line [phone], [facility] department [address]  and PCP office for questions related to their healthcare. CTN provided contact information for future needs. Reviewed and educated patient on any new and changed medications related to discharge diagnosis. Patient/family/caregiver given information for [employer] and agrees to enroll :yes  Patient's preferred e-mail: [email or web address]. [employer]  Patient's preferred phone number:  [phone]  Based on Loop alert triggers, patient will be contacted by nurse care manager for worsening symptoms. Patient will be further monitored by the [facility] Team as needed based on severity of symptoms and risk factors. Spoke with [address] Confirmed services and plan for [facility] follow up.      Care Transitions 24 Hour Call    Do you have a copy of your discharge instructions?: Yes  Do you have all of your prescriptions and are they filled?: Yes  Have you been contacted by a [employer] [address]?: No  Have you scheduled your follow up appointment?: No  Were you discharged with any Home Care or Post Acute Services: Yes  Post Acute Services: [address] [place] [address] you feel like you have everything you need to keep you well at home?: Yes  Care Transitions Interventions           Future Appointments   Date Time Provider Ileana Keller   7/19/2021  1:30 PM SCHEDULE, [facility]   7/27/2021  9:45 AM [facility] [place]   7/27/2021 10:00 AM Cori Trevino MD [facility] CC CAMILA Sloan RN

## 2021-03-31 ENCOUNTER — TELEPHONE (OUTPATIENT)
Dept: NEUROSURGERY | Age: 76
End: 2021-03-31

## 2021-03-31 NOTE — TELEPHONE ENCOUNTER
Patient returned phone call and still has pain in her left leg and it keeps her up at night at times and then she gets up in the chair. Patient uses lidocaine patches, and stretches helps it. If it does not improve by Monday patient advised to call Dr. Patel [facility] office. She states\" That the pain that she is having now is nothing compared to the pain that she was having before surgery and that she can tolerate this pain a lot better. \" Advised to call with any questions.

## 2021-03-31 NOTE — TELEPHONE ENCOUNTER
Attempted to call patient to follow-up after her surgery. Voicemail message was left for patient to return my call.

## 2021-04-28 ENCOUNTER — TELEPHONE (OUTPATIENT)
Dept: NEUROSURGERY | Age: 76
End: 2021-04-28

## 2021-04-28 NOTE — TELEPHONE ENCOUNTER
Called to remind patient of her appointment with Dr. Hong on Friday and the need for x-rays prior to appointment. Left message for her to return my call.

## 2021-04-30 ENCOUNTER — HOSPITAL ENCOUNTER (OUTPATIENT)
Age: 76
Discharge: HOME | End: 2021-04-30
Payer: MEDICARE

## 2021-04-30 ENCOUNTER — HOSPITAL ENCOUNTER (OUTPATIENT)
Dept: GENERAL RADIOLOGY | Age: 76
Discharge: HOME | End: 2021-04-30
Payer: MEDICARE

## 2021-04-30 DIAGNOSIS — Z98.1 STATUS POST LUMBAR SPINAL FUSION: ICD-10-CM

## 2021-04-30 DIAGNOSIS — Z48.89 AFTERCARE FOLLOWING SURGERY: ICD-10-CM

## 2021-04-30 PROCEDURE — 72110 X-RAY EXAM L-2 SPINE 4/>VWS: CPT

## 2021-06-11 ENCOUNTER — HOSPITAL ENCOUNTER (OUTPATIENT)
Age: 76
Discharge: HOME | End: 2021-06-11
Payer: MEDICARE

## 2021-06-11 ENCOUNTER — HOSPITAL ENCOUNTER (OUTPATIENT)
Dept: GENERAL RADIOLOGY | Age: 76
Discharge: HOME | End: 2021-06-11
Payer: MEDICARE

## 2021-06-11 DIAGNOSIS — Z48.89 AFTERCARE FOLLOWING SURGERY: ICD-10-CM

## 2021-06-11 PROCEDURE — 72110 X-RAY EXAM L-2 SPINE 4/>VWS: CPT

## 2021-07-19 ENCOUNTER — HOSPITAL ENCOUNTER (OUTPATIENT)
Dept: INFUSION THERAPY | Age: 76
Discharge: HOME | End: 2021-07-19
Payer: MEDICARE

## 2021-07-19 DIAGNOSIS — C83.00 MALIGNANT LYMPHOPLASMACYTIC LYMPHOMA (HCC): Primary | ICD-10-CM

## 2021-07-19 LAB
ALBUMIN SERPL-MCNC: 4.2 GM/DL (ref 3.4–5)
ALP BLD-CCNC: 54 IU/L (ref 40–128)
ALT SERPL-CCNC: 17 U/L (ref 10–40)
ANION GAP SERPL CALCULATED.3IONS-SCNC: 14 MMOL/L (ref 4–16)
AST SERPL-CCNC: 19 IU/L (ref 15–37)
BASOPHILS ABSOLUTE: 0 K/CU MM
BASOPHILS RELATIVE PERCENT: 0.2 % (ref 0–1)
BILIRUB SERPL-MCNC: 0.4 MG/DL (ref 0–1)
BUN BLDV-MCNC: 21 MG/DL (ref 6–23)
CALCIUM SERPL-MCNC: 9.5 MG/DL (ref 8.3–10.6)
CHLORIDE BLD-SCNC: 100 MMOL/L (ref 99–110)
CO2: 25 MMOL/L (ref 21–32)
CREAT SERPL-MCNC: 0.9 MG/DL (ref 0.6–1.1)
DIFFERENTIAL TYPE: ABNORMAL
EOSINOPHILS ABSOLUTE: 0.2 K/CU MM
EOSINOPHILS RELATIVE PERCENT: 2.9 % (ref 0–3)
ERYTHROCYTE SEDIMENTATION RATE: 6 MM/HR (ref 0–30)
GFR AFRICAN AMERICAN: >60 ML/MIN/1.73M2
GFR NON-AFRICAN AMERICAN: >60 ML/MIN/1.73M2
GLUCOSE BLD-MCNC: 202 MG/DL (ref 70–99)
HCT VFR BLD CALC: 40.2 % (ref 37–47)
HEMOGLOBIN: 14 GM/DL (ref 12.5–16)
LACTATE DEHYDROGENASE: 194 IU/L (ref 120–246)
LYMPHOCYTES ABSOLUTE: 1.4 K/CU MM
LYMPHOCYTES RELATIVE PERCENT: 27.6 % (ref 24–44)
MCH RBC QN AUTO: 33.3 PG (ref 27–31)
MCHC RBC AUTO-ENTMCNC: 34.8 % (ref 32–36)
MCV RBC AUTO: 95.7 FL (ref 78–100)
MONOCYTES ABSOLUTE: 0.5 K/CU MM
MONOCYTES RELATIVE PERCENT: 8.8 % (ref 0–4)
PDW BLD-RTO: 13.9 % (ref 11.7–14.9)
PLATELET # BLD: 144 K/CU MM (ref 140–440)
PMV BLD AUTO: 9.2 FL (ref 7.5–11.1)
POTASSIUM SERPL-SCNC: 3.8 MMOL/L (ref 3.5–5.1)
RBC # BLD: 4.2 M/CU MM (ref 4.2–5.4)
SEGMENTED NEUTROPHILS ABSOLUTE COUNT: 3.1 K/CU MM
SEGMENTED NEUTROPHILS RELATIVE PERCENT: 60.5 % (ref 36–66)
SODIUM BLD-SCNC: 139 MMOL/L (ref 135–145)
TOTAL PROTEIN: 6.1 GM/DL (ref 6.4–8.2)
WBC # BLD: 5.1 K/CU MM (ref 4–10.5)

## 2021-07-19 PROCEDURE — 85652 RBC SED RATE AUTOMATED: CPT

## 2021-07-19 PROCEDURE — 86320 SERUM IMMUNOELECTROPHORESIS: CPT

## 2021-07-19 PROCEDURE — 83883 ASSAY NEPHELOMETRY NOT SPEC: CPT

## 2021-07-19 PROCEDURE — 80053 COMPREHEN METABOLIC PANEL: CPT

## 2021-07-19 PROCEDURE — 84165 PROTEIN E-PHORESIS SERUM: CPT

## 2021-07-19 PROCEDURE — 82784 ASSAY IGA/IGD/IGG/IGM EACH: CPT

## 2021-07-19 PROCEDURE — 2580000003 HC RX 258: Performed by: INTERNAL MEDICINE

## 2021-07-19 PROCEDURE — 82232 ASSAY OF BETA-2 PROTEIN: CPT

## 2021-07-19 PROCEDURE — 6360000002 HC RX W HCPCS: Performed by: INTERNAL MEDICINE

## 2021-07-19 PROCEDURE — 85025 COMPLETE CBC W/AUTO DIFF WBC: CPT

## 2021-07-19 PROCEDURE — 36591 DRAW BLOOD OFF VENOUS DEVICE: CPT

## 2021-07-19 PROCEDURE — 83615 LACTATE (LD) (LDH) ENZYME: CPT

## 2021-07-19 RX ORDER — SODIUM CHLORIDE 0.9 % (FLUSH) 0.9 %
10 SYRINGE (ML) INJECTION PRN
Status: CANCELLED | OUTPATIENT
Start: 2021-07-19

## 2021-07-19 RX ORDER — HEPARIN SODIUM (PORCINE) LOCK FLUSH IV SOLN 100 UNIT/ML 100 UNIT/ML
500 SOLUTION INTRAVENOUS PRN
Status: CANCELLED | OUTPATIENT
Start: 2021-07-19

## 2021-07-19 RX ORDER — SODIUM CHLORIDE 0.9 % (FLUSH) 0.9 %
20 SYRINGE (ML) INJECTION PRN
Status: CANCELLED | OUTPATIENT
Start: 2021-07-19

## 2021-07-19 RX ORDER — SODIUM CHLORIDE 0.9 % (FLUSH) 0.9 %
20 SYRINGE (ML) INJECTION PRN
Status: DISCONTINUED | OUTPATIENT
Start: 2021-07-19 | End: 2021-07-20 | Stop reason: HOSPADM

## 2021-07-19 RX ORDER — HEPARIN SODIUM (PORCINE) LOCK FLUSH IV SOLN 100 UNIT/ML 100 UNIT/ML
500 SOLUTION INTRAVENOUS PRN
Status: DISCONTINUED | OUTPATIENT
Start: 2021-07-19 | End: 2021-07-20 | Stop reason: HOSPADM

## 2021-07-19 RX ADMIN — HEPARIN 500 UNITS: 100 SYRINGE at 13:41

## 2021-07-19 RX ADMIN — SODIUM CHLORIDE, PRESERVATIVE FREE 20 ML: 5 INJECTION INTRAVENOUS at 13:40

## 2021-07-19 NOTE — PROGRESS NOTES
Here for port draw. Right chest mediport accessed without difficulty. + blood return noted. Labs obtained. Flushed mediport per protocol, mandie'pablo esqueda. Tolerated well.

## 2021-07-20 LAB
IGA: 188 MG/DL (ref 69–382)
IGG,SERUM: <300 MG/DL (ref 723–1685)
IGM,SERUM: 312 MG/DL (ref 62–277)

## 2021-07-21 LAB
ALBUMIN ELP: 4.1 GM/DL (ref 3.2–5.6)
ALPHA-1-GLOBULIN: 0.2 GM/DL (ref 0.1–0.4)
ALPHA-2-GLOBULIN: 0.6 GM/DL (ref 0.4–1.2)
BETA GLOBULIN: 0.7 GM/DL (ref 0.5–1.3)
GAMMA GLOBULIN: 0.6 GM/DL (ref 0.5–1.6)
SPEP INTERPRETATION: ABNORMAL
SPEP INTERPRETATION: NORMAL
TOTAL PROTEIN: 6.1 GM/DL (ref 6.4–8.2)

## 2021-07-22 LAB
BETA-2 MICROGLOBULIN: 2.4 MG/L (ref 1.1–2.4)
KAPPA QUANT FREE LIGHT CHAINS: 50.47 MG/L (ref 3.3–19.4)
KAPPA/LAMBDA FREE LIGHT CHAIN RATIO: 6.09 (ref 0.26–1.65)
LAMBDA FREE LIGHT CHAINS QNT: 8.29 MG/L (ref 5.71–26.3)

## 2021-07-27 ENCOUNTER — OFFICE VISIT (OUTPATIENT)
Dept: ONCOLOGY | Age: 76
End: 2021-07-27
Payer: MEDICARE

## 2021-07-27 ENCOUNTER — HOSPITAL ENCOUNTER (OUTPATIENT)
Dept: INFUSION THERAPY | Age: 76
Discharge: HOME | End: 2021-07-27
Payer: MEDICARE

## 2021-07-27 VITALS
BODY MASS INDEX: 28.02 KG/M2 | SYSTOLIC BLOOD PRESSURE: 132 MMHG | DIASTOLIC BLOOD PRESSURE: 63 MMHG | OXYGEN SATURATION: 95 % | WEIGHT: 148.4 LBS | HEIGHT: 61 IN | HEART RATE: 65 BPM

## 2021-07-27 DIAGNOSIS — C83.00 MALIGNANT LYMPHOPLASMACYTIC LYMPHOMA (HCC): Primary | ICD-10-CM

## 2021-07-27 PROCEDURE — G8427 DOCREV CUR MEDS BY ELIG CLIN: HCPCS | Performed by: INTERNAL MEDICINE

## 2021-07-27 PROCEDURE — 1036F TOBACCO NON-USER: CPT | Performed by: INTERNAL MEDICINE

## 2021-07-27 PROCEDURE — 3017F COLORECTAL CA SCREEN DOC REV: CPT | Performed by: INTERNAL MEDICINE

## 2021-07-27 PROCEDURE — 4040F PNEUMOC VAC/ADMIN/RCVD: CPT | Performed by: INTERNAL MEDICINE

## 2021-07-27 PROCEDURE — 1090F PRES/ABSN URINE INCON ASSESS: CPT | Performed by: INTERNAL MEDICINE

## 2021-07-27 PROCEDURE — 1123F ACP DISCUSS/DSCN MKR DOCD: CPT | Performed by: INTERNAL MEDICINE

## 2021-07-27 PROCEDURE — G8399 PT W/DXA RESULTS DOCUMENT: HCPCS | Performed by: INTERNAL MEDICINE

## 2021-07-27 PROCEDURE — 99211 OFF/OP EST MAY X REQ PHY/QHP: CPT

## 2021-07-27 PROCEDURE — 99213 OFFICE O/P EST LOW 20 MIN: CPT | Performed by: INTERNAL MEDICINE

## 2021-07-27 PROCEDURE — G8417 CALC BMI ABV UP PARAM F/U: HCPCS | Performed by: INTERNAL MEDICINE

## 2021-07-27 ASSESSMENT — PATIENT HEALTH QUESTIONNAIRE - PHQ9
SUM OF ALL RESPONSES TO PHQ9 QUESTIONS 1 & 2: 0
1. LITTLE INTEREST OR PLEASURE IN DOING THINGS: 0
SUM OF ALL RESPONSES TO PHQ QUESTIONS 1-9: 0
2. FEELING DOWN, DEPRESSED OR HOPELESS: 0
SUM OF ALL RESPONSES TO PHQ QUESTIONS 1-9: 0
SUM OF ALL RESPONSES TO PHQ QUESTIONS 1-9: 0

## 2021-07-27 NOTE — PROGRESS NOTES
Patient Name: Kamla Montilla  Patient : 1945  Patient MRN: [id number]     Primary Oncologist: Alessandra Willams MD  Referring Provider: Taty Acharya MD     Date of Service: 2021      Chief Complaint:   Chief Complaint   Patient presents with    Follow-up    Discuss Labs     Patient Active Problem List:     Restless leg syndrome     Hypertension     Diabetes mellitus with neuropathy ([address]     Malignant lymphoplasmacytic lymphoma ([address]     Osteopenia     Gastroesophageal reflux disease without esophagitis     Insomnia     Osteoporosis    HPI:  (Televisit)  Ms. Taylor Casas is a 61-year-old very pleasant patient with medical history significant for hypertension, diabetes mellitus, uterine cancer status post hysterectomy in , and chronic kidney disease and discoid lupus erythematosus of eyelid, initially referred to me on 2015, for evaluation of monoclonal gammopathy to rule out plasma cell dyscrasia. Ms. Taylor Casas stated that she was seen by Dr. Deloris Ruiz for proteinuria. Dr. Deloris Ruiz recognized that the patient has elevated gamma globulin on complete metabolic panel and he sent for serum protein electrophoresis and serum free light chain assay. She was found to have 3.17 grams of monoclonal protein on serum protein electrophoresis. Her serum Kappa light chain was more than 700 and Kappa Lambda ratio was more than 70. Because of significant monoclonal protein, she was subsequently referred to me for further evaluation. Laboratory workups done on 2015, showed 2.4 grams of IgM Kappa monoclonal protein on serum protein electrophoresis and immunofixation. LDH was 147, serum Kappa light chain was 121 mg/dL and Kappa Lambda ratio was 327.03. Bone marrow biopsy showed clonal B-cell population detected (26 percent of analyzed cells), CD5 negative, CD10 negative with Kappa light chain restriction. Plasma cells are essentially absent. FISH study was negative.       Second opinion from the bone marrow Pathologist at the [facility] confirmed that Ms. [facility] has lymphoplasmacytic lymphoma. Additional workup showed IgM level of 5362 mg/dL, which is significantly elevated. She also has decreased IgG and IgA level. Ms. [facility] had CT scan of the chest, abdomen, and pelvis on August 11, 2015, and it showed no acute abnormality in the chest, abdomen, or pelvis. She has cholelithiasis, left kidney cyst, and mild bladder wall thickening which is non-specific. Since Ms. [facility] has hyperviscosity state, chemotherapy with fludarabine and Rituximab was started on August 24, 2015 and she has completed her fourth cycle on December 16, 2015. On July 27, 2021, she presented to me for followup. I have been following Ms. [facility] for lymphoplasmacytic lymphoma (Waldenstrom macroglobulinemia) and she is status post four cycles of chemotherapy with fludarabine and rituximab. She has been under close observation since she completed the chemotherapy. She achieved partial response to chemotherapy and her monoclonal protein has been quite stable since then. She does not have any signs or symptoms suggestive of hyperviscosity state and her monoclonal protein was only 200 mg/dL on serum protein electrophoresis done on 7/18/2021. Her IgM level was <300 mg/dL only. She has normal platelet count this time. 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 Since she is completely asymptomatic and her monoclonal protein has been stable, I recommend to continue with close observation only. No additional workups or interventions needed at this moment. Hypertension - on losartan/HCTZ. Diabetes - on insulin degludec and novolog. Hyperlipidemia - on zocor    I will see her again in 6 months and I will repeat all the blood tests again a week before her next appointment. PAST MEDICAL HISTORY:  Significant for:  1. Hypertension.   2.         Diabetes mellitus. 3.         Uterine cancer. 4.         Discoid lupus. PAST SURGICAL HISTORY:  Significant for:  1. Hysterectomy in 1988. FAMILY HISTORY:  Significant for breast cancer in her mother and liver cancer in her father. No other family history of cancer disease. SOCIAL HISTORY:  She was a former smoker and she quit smoking in 1966. She denies alcohol drinking and illicit drug abuse. ALLERGIES:  She claims to have allergies to latex. No known drug allergies. Oncology History    No history exists. Review of Systems: \"Per interval history; otherwise 10 point ROS is negative. \"  Her energy level is good, appetite, and sleep are fine. She doesn't have fever, chills, night sweats, cough, shortness of breath, chest pain, hemoptysis, or palpitations. Her bowel and bladder functions are normal. She denies nausea, vomiting, abdominal pain, diarrhea, constipation, dysuria, loss of appetite, or weight loss. She doesn't have neuropathy and she denies bleeding or clotting issues. She denies any pain in her body. No anxiety or depression. The rest of the systems are unremarkable.      Vital Signs:  /63 (Site: Right Upper Arm, Position: Sitting, Cuff Size: Medium Adult)   Pulse 65   Ht 5' 1\" (1.549 m)   Wt 148 lb 6.4 oz (67.3 kg)   SpO2 95%   BMI 28.04 kg/m²     Physical Exam:  CONSTITUTIONAL: awake, alert, cooperative, no apparent distress   EYES: pupils equal, round and reactive to light, sclera clear and conjunctiva normal  ENT: Normocephalic, without obvious abnormality, atraumatic  NECK: supple, symmetrical, no jugular venous distension and no carotid bruits   HEMATOLOGIC/LYMPHATIC: no cervical, supraclavicular or axillary lymphadenopathy   LUNGS: VBS, no wheezes, no increased work of breathing, no crackles, clear to auscultation   CARDIOVASCULAR: regular rate and rhythm, normal S1 and S2, no murmur noted  ABDOMEN: normal bowel sounds x 4, soft, non-distended, non-tender, no masses palpated, no hepatosplenomegaly   MUSCULOSKELETAL: full range of motion noted, tone is normal  NEUROLOGIC: awake, alert, oriented to name, place and time. Motor skills grossly intact.   SKIN: Normal skin color, texture, turgor and no jaundice.  appears intact   EXTREMITIES: no LE edema, no leg swelling, no clubbing, no cyanosis     Labs:  Hematology:  Lab Results   Component Value Date    WBC 5.1 07/19/2021    RBC 4.20 07/19/2021    HGB 14.0 07/19/2021    HCT 40.2 07/19/2021    MCV 95.7 07/19/2021    MCH 33.3 (H) 07/19/2021    MCHC 34.8 07/19/2021    RDW 13.9 07/19/2021     07/19/2021    MPV 9.2 07/19/2021    BANDSPCT 6 07/23/2015    SEGSPCT 60.5 07/19/2021    EOSRELPCT 2.9 07/19/2021    BASOPCT 0.2 07/19/2021    LYMPHOPCT 27.6 07/19/2021    MONOPCT 8.8 (H) 07/19/2021    BANDABS 0.29 07/23/2015    SEGSABS 3.1 07/19/2021    EOSABS 0.2 07/19/2021    BASOSABS 0.0 07/19/2021    LYMPHSABS 1.4 07/19/2021    MONOSABS 0.5 07/19/2021    DIFFTYPE AUTOMATED DIFFERENTIAL 07/19/2021    WBCMORP OCCASIONAL 07/23/2015     Lab Results   Component Value Date    ESR 6 07/19/2021     Chemistry:  Lab Results   Component Value Date     07/19/2021    K 3.8 07/19/2021     07/19/2021    CO2 25 07/19/2021    BUN 21 07/19/2021    CREATININE 0.9 07/19/2021    GLUCOSE 202 (H) 07/19/2021    CALCIUM 9.5 07/19/2021    PROT 6.1 (L) 07/19/2021    PROT 6.1 (L) 07/19/2021    LABALBU 4.2 07/19/2021    BILITOT 0.4 07/19/2021    ALKPHOS 54 07/19/2021    AST 19 07/19/2021    ALT 17 07/19/2021    LABGLOM >60 07/19/2021    GFRAA >60 07/19/2021    AGRATIO 1.9 08/16/2016    GLOB 2.2 08/16/2016    MG 2.2 03/15/2021    POCGLU 171 (H) 03/18/2021     Lab Results   Component Value Date     07/19/2021     No components found for: LD  Lab Results   Component Value Date    TSHHS 1.120 08/31/2015     Immunology:  Lab Results   Component Value Date    PROT 6.1 (L) 07/19/2021    PROT 6.1 (L) 07/19/2021    SPEP  07/19/2021 INTERPRETATION - Monoclonal gammopathy, IgM kappa. Davonte Zabala MD    SPEP  07/19/2021     INTERPRETATION - Monoclonal gammopathy, 200 mg/dL, identified as IgM kappa on concurrent MANJIT. Davonte Zabala MD    ALBUMINELP 4.1 07/19/2021    LABALPH 0.2 07/19/2021    LABALPH 0.6 07/19/2021    LABBETA 0.7 07/19/2021    GAMGLOB 0.6 07/19/2021     Lab Results   Component Value Date    KAPPAUVOL 50.47 (H) 07/19/2021    LAMBDAUVOL 0.61 12/07/2015    KLFLCR 6.09 (H) 07/19/2021     Lab Results   Component Value Date    B2M 2.4 07/19/2021     Coagulation Panel:  Lab Results   Component Value Date    PROTIME 13.0 03/15/2021    INR 1.07 03/15/2021    APTT 31.8 08/17/2015    DDIMER 711 (H) 08/31/2015     Anemia Panel:  Lab Results   Component Value Date    YJQRQVAW21 642.9 08/31/2015    FOLATE 17.3 08/31/2015     Tumor Markers:  No results found for: , CEA, , LABCA2, PSA  Observations:  PHQ-9 Total Score: 0 (7/27/2021 10:44 AM)        Assessment & Plan:   Lymphoplasmacytic lymphoma (Waldenstrom's macroglobulinemia). PLAN:  Roseanna Dove has been followed for lymphoplasmacytic lymphoma (Waldenstrom macroglobulinemia) and she is status post four cycles of chemotherapy with fludarabine and rituximab. On July 27, 2021, she presented to me for followup. I have been following Ms. Roseanna Dove for lymphoplasmacytic lymphoma (Waldenstrom macroglobulinemia) and she is status post four cycles of chemotherapy with fludarabine and rituximab. She has been under close observation since she completed the chemotherapy. She achieved partial response to chemotherapy and her monoclonal protein has been quite stable since then. She does not have any signs or symptoms suggestive of hyperviscosity state and her monoclonal protein was only 200 mg/dL on serum protein electrophoresis done on 7/18/2021. Her IgM level was <300 mg/dL only. She has normal platelet count this time.  I believe her mild thrombocytopenia before was due to

## 2021-09-03 ENCOUNTER — HOSPITAL ENCOUNTER (OUTPATIENT)
Dept: GENERAL RADIOLOGY | Age: 76
Discharge: HOME | End: 2021-09-03
Payer: MEDICARE

## 2021-09-03 ENCOUNTER — HOSPITAL ENCOUNTER (OUTPATIENT)
Age: 76
Discharge: HOME | End: 2021-09-03
Payer: MEDICARE

## 2021-09-03 DIAGNOSIS — Z98.1 S/P LUMBAR FUSION: ICD-10-CM

## 2021-09-03 PROCEDURE — 72110 X-RAY EXAM L-2 SPINE 4/>VWS: CPT

## 2021-10-29 ENCOUNTER — HOSPITAL ENCOUNTER (OUTPATIENT)
Dept: WOMENS IMAGING | Age: 76
Discharge: HOME | End: 2021-10-29
Payer: MEDICARE

## 2021-10-29 DIAGNOSIS — N95.1 SYMPTOMATIC MENOPAUSAL OR FEMALE CLIMACTERIC STATES: ICD-10-CM

## 2021-10-29 DIAGNOSIS — Z12.31 VISIT FOR SCREENING MAMMOGRAM: ICD-10-CM

## 2021-10-29 PROCEDURE — 77063 BREAST TOMOSYNTHESIS BI: CPT

## 2021-10-29 PROCEDURE — 77080 DXA BONE DENSITY AXIAL: CPT

## 2021-11-11 ENCOUNTER — HOSPITAL ENCOUNTER (OUTPATIENT)
Dept: WOMENS IMAGING | Age: 76
Discharge: HOME | End: 2021-11-11
Payer: MEDICARE

## 2021-11-11 DIAGNOSIS — R92.8 ABNORMAL SCREENING MAMMOGRAM: ICD-10-CM

## 2021-11-11 DIAGNOSIS — R92.1 BREAST CALCIFICATION SEEN ON MAMMOGRAM: ICD-10-CM

## 2021-11-11 PROCEDURE — 77065 DX MAMMO INCL CAD UNI: CPT

## 2021-11-23 ENCOUNTER — HOSPITAL ENCOUNTER (OUTPATIENT)
Dept: WOMENS IMAGING | Age: 76
Discharge: HOME | End: 2021-11-23
Payer: MEDICARE

## 2021-11-23 DIAGNOSIS — R92.8 ABNORMAL FINDING ON BREAST IMAGING: ICD-10-CM

## 2021-11-23 DIAGNOSIS — R92.1 BREAST CALCIFICATION SEEN ON MAMMOGRAM: ICD-10-CM

## 2021-11-23 PROCEDURE — 88305 TISSUE EXAM BY PATHOLOGIST: CPT | Performed by: PATHOLOGY

## 2021-11-23 PROCEDURE — 88341 IMHCHEM/IMCYTCHM EA ADD ANTB: CPT | Performed by: PATHOLOGY

## 2021-11-23 PROCEDURE — 88342 IMHCHEM/IMCYTCHM 1ST ANTB: CPT | Performed by: PATHOLOGY

## 2021-11-23 PROCEDURE — A4648 IMPLANTABLE TISSUE MARKER: HCPCS

## 2021-11-23 PROCEDURE — 88360 TUMOR IMMUNOHISTOCHEM/MANUAL: CPT | Performed by: PATHOLOGY

## 2021-12-01 ENCOUNTER — CLINICAL DOCUMENTATION (OUTPATIENT)
Dept: CASE MANAGEMENT | Age: 76
End: 2021-12-01

## 2021-12-01 DIAGNOSIS — D05.11 DUCTAL CARCINOMA IN SITU (DCIS) OF RIGHT BREAST: Primary | ICD-10-CM

## 2021-12-01 NOTE — PROGRESS NOTES
Patient established with Dr. 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 Appointment made with Dr. Maria Ines Rodriguez for Monday, 12/6/21 at 0945 to discuss new diagnosis. Patient requesting to see Dr. Arielle Ruiz for surgical consult - order placed and patient scheduled for Tuesday, 12/7/21 at 1145.

## 2021-12-06 ENCOUNTER — HOSPITAL ENCOUNTER (OUTPATIENT)
Dept: INFUSION THERAPY | Age: 76
Discharge: HOME | End: 2021-12-06
Payer: MEDICARE

## 2021-12-06 ENCOUNTER — OFFICE VISIT (OUTPATIENT)
Dept: ONCOLOGY | Age: 76
End: 2021-12-06
Payer: MEDICARE

## 2021-12-06 VITALS
DIASTOLIC BLOOD PRESSURE: 70 MMHG | SYSTOLIC BLOOD PRESSURE: 150 MMHG | HEART RATE: 63 BPM | WEIGHT: 148.2 LBS | TEMPERATURE: 96.7 F | RESPIRATION RATE: 16 BRPM | HEIGHT: 62 IN | OXYGEN SATURATION: 97 % | BODY MASS INDEX: 27.27 KG/M2

## 2021-12-06 DIAGNOSIS — C83.00 MALIGNANT LYMPHOPLASMACYTIC LYMPHOMA (HCC): Primary | ICD-10-CM

## 2021-12-06 DIAGNOSIS — D05.11 DUCTAL CARCINOMA IN SITU (DCIS) OF RIGHT BREAST: ICD-10-CM

## 2021-12-06 DIAGNOSIS — D05.11 DUCTAL CARCINOMA IN SITU (DCIS) OF RIGHT BREAST: Primary | ICD-10-CM

## 2021-12-06 PROCEDURE — 1036F TOBACCO NON-USER: CPT | Performed by: INTERNAL MEDICINE

## 2021-12-06 PROCEDURE — G8399 PT W/DXA RESULTS DOCUMENT: HCPCS | Performed by: INTERNAL MEDICINE

## 2021-12-06 PROCEDURE — 99211 OFF/OP EST MAY X REQ PHY/QHP: CPT

## 2021-12-06 PROCEDURE — G8427 DOCREV CUR MEDS BY ELIG CLIN: HCPCS | Performed by: INTERNAL MEDICINE

## 2021-12-06 PROCEDURE — 3017F COLORECTAL CA SCREEN DOC REV: CPT | Performed by: INTERNAL MEDICINE

## 2021-12-06 PROCEDURE — G8484 FLU IMMUNIZE NO ADMIN: HCPCS | Performed by: INTERNAL MEDICINE

## 2021-12-06 PROCEDURE — 99215 OFFICE O/P EST HI 40 MIN: CPT | Performed by: INTERNAL MEDICINE

## 2021-12-06 PROCEDURE — 4040F PNEUMOC VAC/ADMIN/RCVD: CPT | Performed by: INTERNAL MEDICINE

## 2021-12-06 PROCEDURE — 1123F ACP DISCUSS/DSCN MKR DOCD: CPT | Performed by: INTERNAL MEDICINE

## 2021-12-06 PROCEDURE — G8417 CALC BMI ABV UP PARAM F/U: HCPCS | Performed by: INTERNAL MEDICINE

## 2021-12-06 PROCEDURE — 1090F PRES/ABSN URINE INCON ASSESS: CPT | Performed by: INTERNAL MEDICINE

## 2021-12-06 ASSESSMENT — PATIENT HEALTH QUESTIONNAIRE - PHQ9
SUM OF ALL RESPONSES TO PHQ QUESTIONS 1-9: 2
1. LITTLE INTEREST OR PLEASURE IN DOING THINGS: 1
2. FEELING DOWN, DEPRESSED OR HOPELESS: 1
SUM OF ALL RESPONSES TO PHQ QUESTIONS 1-9: 2
SUM OF ALL RESPONSES TO PHQ9 QUESTIONS 1 & 2: 2
SUM OF ALL RESPONSES TO PHQ QUESTIONS 1-9: 2

## 2021-12-06 NOTE — PROGRESS NOTES
Patient Name: Maricarmen Le  Patient : 1945  Patient MRN: [id number]     Primary Oncologist: Jonnie Cantor MD  Referring Provider: Cooper Coon MD     Date of Service: 2021      Chief Complaint:   Chief Complaint   Patient presents with   [address]     Patient Active Problem List:     Restless leg syndrome     Hypertension     Diabetes mellitus with neuropathy ([address]     Malignant lymphoplasmacytic lymphoma ([address]     Osteopenia     Gastroesophageal reflux disease without esophagitis     Insomnia     Osteoporosis    HPI:   Ms. Morgan Spear is a 67-year-old very pleasant patient with medical history significant for hypertension, diabetes mellitus, uterine cancer status post hysterectomy in , and chronic kidney disease and discoid lupus erythematosus of eyelid, initially referred to me on 2015, for evaluation of monoclonal gammopathy to rule out plasma cell dyscrasia. Ms. Morgan Spear stated that she was seen by Dr. Darnell Navarro for proteinuria. Dr. Darnell Navarro recognized that the patient has elevated gamma globulin on complete metabolic panel and he sent for serum protein electrophoresis and serum free light chain assay. She was found to have 3.17 grams of monoclonal protein on serum protein electrophoresis. Her serum Kappa light chain was more than 700 and Kappa Lambda ratio was more than 70. Because of significant monoclonal protein, she was subsequently referred to me for further evaluation. Laboratory workups done on 2015, showed 2.4 grams of IgM Kappa monoclonal protein on serum protein electrophoresis and immunofixation. LDH was 147, serum Kappa light chain was 121 mg/dL and Kappa Lambda ratio was 327.03. Bone marrow biopsy showed clonal B-cell population detected (26 percent of analyzed cells), CD5 negative, CD10 negative with Kappa light chain restriction. Plasma cells are essentially absent. FISH study was negative.       Second opinion from the bone marrow Pathologist at the [facility] confirmed that Ms. Bandar Hoskins has lymphoplasmacytic lymphoma. Additional workup showed IgM level of 5362 mg/dL, which is significantly elevated. She also has decreased IgG and IgA level. Ms. Bandar Hoskins had CT scan of the chest, abdomen, and pelvis on 2015, and it showed no acute abnormality in the chest, abdomen, or pelvis. She has cholelithiasis, left kidney cyst, and mild bladder wall thickening which is non-specific. Since Ms. Bandar Hoskins has hyperviscosity state, chemotherapy with fludarabine and Rituximab was started on 2015 and she has completed her fourth cycle on 2015. Screening mammogram on 10/29/2021 showed increasing microcalcifications in the right breast some of which could be vascular in etiology. Diagnostic right breast mammogram on 2021 showed mildly pleomorphic cluster of microcalcifications at the 9 o'clock position of the right breast for which further evaluation with stereotactic biopsy is recommended. 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 ER by IHC - positive (90% strong), PgR by IHC - positive (40% weak). On 2021, she presented to me since she was found to have right breast DCIS. I reviewed with her findings on her mammograms and pathology. Also reviewed her family history (her mother was diagnosed with breast ca at the age of 46 and she  of breast ca at 47. Her sister was diagnosed with breast ca at the are of 62 and  of breast ca at the age of 61. Her maternal aunt was diagnosed with breast cancer in her [de-identified]). We discussed about having genetic testing today since she has strong family history. We also discussed about local therapy (lumpectomy + adjuvant RT as well as mastectomy +/- SLNB) and adjuvant endocrine therapy. After long discussion with her, she agrees to have genetic testing today.  Will discuss more with Dr. Stephenie Santacruz about surgical option tomorrow. I will see her back after surgery. Ms. Tricia Gillis has been seen by me for lymphoplasmacytic lymphoma (Waldenstrom macroglobulinemia) and she is status post four cycles of chemotherapy with fludarabine and rituximab. She has been under close observation since she completed the chemotherapy. She achieved partial response to chemotherapy and her monoclonal protein has been quite stable since then. She does not have any signs or symptoms suggestive of hyperviscosity state and her monoclonal protein was only 200 mg/dL on serum protein electrophoresis done on 7/18/2021. Her IgM level was <300 mg/dL only. She has normal platelet count this time. 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 Since she is completely asymptomatic and her monoclonal protein has been stable, I recommend to continue with close observation only. No additional workups or interventions needed at this moment. Hypertension - on losartan/HCTZ. Diabetes - on insulin degludec and novolog. Hyperlipidemia - on zocor    In her next office visit, I will repeat all the blood tests. She doesn't have any significant symptoms at today visit. PAST MEDICAL HISTORY:  Significant for:  1. Hypertension. 2.         Diabetes mellitus. 3.         Uterine cancer. 4.         Discoid lupus. PAST SURGICAL HISTORY:  Significant for:  1. Hysterectomy in 1988. FAMILY HISTORY:  Significant for breast cancer in her mother and liver cancer in her father. No other family history of cancer disease. SOCIAL HISTORY:  She was a former smoker and she quit smoking in 1966. She denies alcohol drinking and illicit drug abuse. ALLERGIES:  She claims to have allergies to latex. No known drug allergies. Oncology History    No history exists. Review of Systems:   \"Per interval history; otherwise 10 point ROS is negative. \"  Her energy level is pretty good and appetite is fine. Her sleep is good. She denies fever, chills, night sweats, cough, shortness of breath, chest pain, hemoptysis, or palpitations. Her bowel and bladder functions are normal. She doesn't have nausea, vomiting, abdominal pain, diarrhea, constipation, dysuria, loss of appetit or weight loss. She denies neuropathy and she doesn't have bleeding or clotting issues. She doesn't have any pain in her body. Denies anxiety or depression. The rest of the systems are unremarkable. Vital Signs:  BP (!) 150/70 (Site: Right Upper Arm, Position: Sitting, Cuff Size: Large Adult)   Pulse 63   Temp 96.7 °F (35.9 °C) (Infrared)   Resp 16   Ht 5' 2\" (1.575 m)   Wt 148 lb 3.2 oz (67.2 kg)   SpO2 97%   BMI 27.11 kg/m²     Physical Exam:  CONSTITUTIONAL: awake, alert, cooperative, no apparent distress   EYES: pupils equal, round and reactive to light, sclera clear and conjunctiva normal  ENT: Normocephalic, without obvious abnormality, atraumatic  NECK: supple, symmetrical, no jugular venous distension and no carotid bruits   HEMATOLOGIC/LYMPHATIC: no cervical, supraclavicular or axillary lymphadenopathy   LUNGS: VBS, no wheezes, clear to auscultation, no increased work of breathing, no crackles,   CARDIOVASCULAR: regular rate and rhythm, normal S1 and S2, no murmur noted  ABDOMEN: normal bowel sounds x 4, soft, non-distended, non-tender, no masses palpated, no hepatosplenomegaly   MUSCULOSKELETAL: full range of motion noted, tone is normal  NEUROLOGIC: awake, alert, oriented to name, place and time. Motor skills grossly intact.   SKIN: Normal skin color, texture, turgor and no jaundice.  appears intact   EXTREMITIES: no LE edema, no cyanosis, no leg swelling, no clubbing,  BREASTS: no palpable mass in bilateral breasts and axilla      Labs:  Hematology:  Lab Results   Component Value Date    WBC 5.1 07/19/2021    RBC 4.20 07/19/2021    HGB 14.0 07/19/2021    HCT 40.2 07/19/2021    MCV 95.7 07/19/2021    MCH 33.3 (H) 07/19/2021    MCHC 34.8 07/19/2021    RDW 13.9 07/19/2021     07/19/2021    MPV 9.2 07/19/2021    BANDSPCT 6 07/23/2015    SEGSPCT 60.5 07/19/2021    EOSRELPCT 2.9 07/19/2021    BASOPCT 0.2 07/19/2021    LYMPHOPCT 27.6 07/19/2021    MONOPCT 8.8 (H) 07/19/2021    BANDABS 0.29 07/23/2015    SEGSABS 3.1 07/19/2021    EOSABS 0.2 07/19/2021    BASOSABS 0.0 07/19/2021    LYMPHSABS 1.4 07/19/2021    MONOSABS 0.5 07/19/2021    DIFFTYPE AUTOMATED DIFFERENTIAL 07/19/2021    WBCMORP OCCASIONAL 07/23/2015     Lab Results   Component Value Date    ESR 6 07/19/2021     Chemistry:  Lab Results   Component Value Date     07/19/2021    K 3.8 07/19/2021     07/19/2021    CO2 25 07/19/2021    BUN 21 07/19/2021    CREATININE 0.9 07/19/2021    GLUCOSE 202 (H) 07/19/2021    CALCIUM 9.5 07/19/2021    PROT 6.1 (L) 07/19/2021    PROT 6.1 (L) 07/19/2021    LABALBU 4.2 07/19/2021    BILITOT 0.4 07/19/2021    ALKPHOS 54 07/19/2021    AST 19 07/19/2021    ALT 17 07/19/2021    LABGLOM >60 07/19/2021    GFRAA >60 07/19/2021    AGRATIO 1.9 08/16/2016    GLOB 2.2 08/16/2016    MG 2.2 03/15/2021    POCGLU 171 (H) 03/18/2021     Lab Results   Component Value Date     07/19/2021     No components found for: LD  Lab Results   Component Value Date    TSHHS 1.120 08/31/2015     Immunology:  Lab Results   Component Value Date    PROT 6.1 (L) 07/19/2021    PROT 6.1 (L) 07/19/2021    SPEP  07/19/2021     INTERPRETATION - Monoclonal gammopathy, IgM kappa. Aleshia Corona MD    SPEP  07/19/2021     INTERPRETATION - Monoclonal gammopathy, 200 mg/dL, identified as IgM kappa on concurrent MANJIT.   Aleshia Corona MD    ALBUMINELP 4.1 07/19/2021    LABALPH 0.2 07/19/2021    LABALPH 0.6 07/19/2021    LABBETA 0.7 07/19/2021    GAMGLOB 0.6 07/19/2021     Lab Results   Component Value Date    KAPPAUVOL 50.47 (H) 07/19/2021    LAMBDAUVOL 0.61 12/07/2015    KLFLCR 6.09 (H) 07/19/2021     Lab Results Component Value Date    B2M 2.4 2021     Coagulation Panel:  Lab Results   Component Value Date    PROTIME 13.0 03/15/2021    INR 1.07 03/15/2021    APTT 31.8 2015    DDIMER 711 (H) 2015     Anemia Panel:  Lab Results   Component Value Date    ACDVSEXB17 642.9 2015    FOLATE 17.3 2015     Tumor Markers:  No results found for: , CEA, , LABCA2, PSA  Observations:  PHQ-9 Total Score: 2 (2021  9:50 AM)        Assessment & Plan:   Lymphoplasmacytic lymphoma (Waldenstrom's macroglobulinemia). PLAN:  Ruddy Rojas has been followed for lymphoplasmacytic lymphoma (Waldenstrom macroglobulinemia) and she is status post four cycles of chemotherapy with fludarabine and rituximab. Screening mammogram on 10/29/2021 showed increasing microcalcifications in the right breast some of which could be vascular in etiology. Diagnostic right breast mammogram on 2021 showed mildly pleomorphic cluster of microcalcifications at the 9 o'clock position of the right breast for which further evaluation with stereotactic biopsy is recommended. 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 ER by IHC - positive (90% strong), PgR by IHC - positive (40% weak). On 2021, she presented to me since she was found to have right breast DCIS. I reviewed with her findings on her mammograms and pathology. Also reviewed her family history (her mother was diagnosed with breast ca at the age of 46 and she  of breast ca at 47. Her sister was diagnosed with breast ca at the are of 62 and  of breast ca at the age of 61. Her maternal aunt was diagnosed with breast cancer in her [de-identified]). We discussed about having genetic testing today since she has strong family history.  We also discussed about local therapy (lumpectomy + adjuvant RT as well as mastectomy +/- SLNB) and adjuvant endocrine therapy. After long discussion with her, she agrees to have genetic testing today. Will discuss more with Dr. Cristal Yung about surgical option tomorrow. I will see her back after surgery. Ms. Nikole Lawson has been seen by me for lymphoplasmacytic lymphoma (Waldenstrom macroglobulinemia) and she is status post four cycles of chemotherapy with fludarabine and rituximab. She has been under close observation since she completed the chemotherapy. She achieved partial response to chemotherapy and her monoclonal protein has been quite stable since then. She does not have any signs or symptoms suggestive of hyperviscosity state and her monoclonal protein was only 200 mg/dL on serum protein electrophoresis done on 7/18/2021. Her IgM level was <300 mg/dL only. She has normal platelet count this time. 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 Since she is completely asymptomatic and her monoclonal protein has been stable, I recommend to continue with close observation only. No additional workups or interventions needed at this moment. Hypertension - on losartan/HCTZ. Diabetes - on insulin degludec and novolog. Hyperlipidemia - on zocor    I answered all her questions and concerns for today. I asked her to follow up with primary care physician, Dr. Glenna Cuellar and Nephrologist, Dr. Sonali Patel on regular basis. Recent imaging and labs were reviewed and discussed with the patient.

## 2021-12-06 NOTE — PROGRESS NOTES
MA Rooming Questions  Patient: Ambreen Rocha  MRN: [id number]    Date: 12/6/2021        1. Do you have any new issues?   no         2. Do you need any refills on medications?    no    3. Have you had any imaging done since your last visit?   no    4. Have you been hospitalized or seen in the emergency room since your last visit here?   no    5. Did the patient have a depression screening completed today?  Yes    PHQ-9 Total Score: 2 (12/6/2021  9:50 AM)       PHQ-9 Given to (if applicable):               PHQ-9 Score (if applicable):                     [] Positive     []  Negative              Does question #9 need addressed (if applicable)                     [] Yes    []  No               Corbin Angel CMA

## 2021-12-14 ENCOUNTER — TELEPHONE (OUTPATIENT)
Dept: ONCOLOGY | Age: 76
End: 2021-12-14

## 2022-01-06 ENCOUNTER — TELEPHONE (OUTPATIENT)
Dept: INFUSION THERAPY | Age: 77
End: 2022-01-06

## 2022-01-06 NOTE — TELEPHONE ENCOUNTER
Pt did not come in for appt. I cancelled appt. Patient stated she was told in Dec. she didn't need Genetic Consult. She also received a letter stating she does not have the Gene.

## 2022-01-20 ENCOUNTER — HOSPITAL ENCOUNTER (OUTPATIENT)
Dept: INFUSION THERAPY | Age: 77
Discharge: HOME | End: 2022-01-20
Payer: MEDICARE

## 2022-01-20 DIAGNOSIS — C83.00 MALIGNANT LYMPHOPLASMACYTIC LYMPHOMA (HCC): Primary | ICD-10-CM

## 2022-01-20 LAB
BASOPHILS ABSOLUTE: 0 K/CU MM
BASOPHILS RELATIVE PERCENT: 0.9 % (ref 0–1)
DIFFERENTIAL TYPE: ABNORMAL
EOSINOPHILS ABSOLUTE: 0.3 K/CU MM
EOSINOPHILS RELATIVE PERCENT: 7.5 % (ref 0–3)
HCT VFR BLD CALC: 35 % (ref 37–47)
HEMOGLOBIN: 11.8 GM/DL (ref 12.5–16)
IGA: 205 MG/DL (ref 69–382)
IGG,SERUM: <300 MG/DL (ref 723–1685)
IGM,SERUM: 252 MG/DL (ref 62–277)
LYMPHOCYTES ABSOLUTE: 1.3 K/CU MM
LYMPHOCYTES RELATIVE PERCENT: 28.6 % (ref 24–44)
MCH RBC QN AUTO: 33.2 PG (ref 27–31)
MCHC RBC AUTO-ENTMCNC: 33.7 % (ref 32–36)
MCV RBC AUTO: 98.6 FL (ref 78–100)
MONOCYTES ABSOLUTE: 0.4 K/CU MM
MONOCYTES RELATIVE PERCENT: 9.7 % (ref 0–4)
PDW BLD-RTO: 15 % (ref 11.7–14.9)
PLATELET # BLD: 161 K/CU MM (ref 140–440)
PMV BLD AUTO: 9.1 FL (ref 7.5–11.1)
RBC # BLD: 3.55 M/CU MM (ref 4.2–5.4)
REASON FOR REJECTION: NORMAL
REJECTED TEST: NORMAL
SEGMENTED NEUTROPHILS ABSOLUTE COUNT: 2.4 K/CU MM
SEGMENTED NEUTROPHILS RELATIVE PERCENT: 53.3 % (ref 36–66)
WBC # BLD: 4.5 K/CU MM (ref 4–10.5)

## 2022-01-20 PROCEDURE — 85652 RBC SED RATE AUTOMATED: CPT

## 2022-01-20 PROCEDURE — 86320 SERUM IMMUNOELECTROPHORESIS: CPT

## 2022-01-20 PROCEDURE — 82232 ASSAY OF BETA-2 PROTEIN: CPT

## 2022-01-20 PROCEDURE — 80053 COMPREHEN METABOLIC PANEL: CPT

## 2022-01-20 PROCEDURE — 83615 LACTATE (LD) (LDH) ENZYME: CPT

## 2022-01-20 PROCEDURE — 82784 ASSAY IGA/IGD/IGG/IGM EACH: CPT

## 2022-01-20 PROCEDURE — 84165 PROTEIN E-PHORESIS SERUM: CPT

## 2022-01-20 PROCEDURE — 36591 DRAW BLOOD OFF VENOUS DEVICE: CPT

## 2022-01-20 PROCEDURE — 85025 COMPLETE CBC W/AUTO DIFF WBC: CPT

## 2022-01-20 PROCEDURE — 6360000002 HC RX W HCPCS: Performed by: INTERNAL MEDICINE

## 2022-01-20 PROCEDURE — 83883 ASSAY NEPHELOMETRY NOT SPEC: CPT

## 2022-01-20 RX ORDER — SODIUM CHLORIDE 0.9 % (FLUSH) 0.9 %
10 SYRINGE (ML) INJECTION PRN
Status: CANCELLED | OUTPATIENT
Start: 2022-01-20

## 2022-01-20 RX ORDER — SODIUM CHLORIDE 0.9 % (FLUSH) 0.9 %
20 SYRINGE (ML) INJECTION PRN
Status: CANCELLED | OUTPATIENT
Start: 2022-01-20

## 2022-01-20 RX ORDER — HEPARIN SODIUM (PORCINE) LOCK FLUSH IV SOLN 100 UNIT/ML 100 UNIT/ML
500 SOLUTION INTRAVENOUS PRN
Status: DISCONTINUED | OUTPATIENT
Start: 2022-01-20 | End: 2022-01-21 | Stop reason: HOSPADM

## 2022-01-20 RX ORDER — HEPARIN SODIUM (PORCINE) LOCK FLUSH IV SOLN 100 UNIT/ML 100 UNIT/ML
500 SOLUTION INTRAVENOUS PRN
Status: CANCELLED | OUTPATIENT
Start: 2022-01-20

## 2022-01-20 RX ADMIN — HEPARIN 500 UNITS: 100 SYRINGE at 13:45

## 2022-01-20 NOTE — PROGRESS NOTES
Pt reported today for port draw. Has a Mediport on R side chest and was accessed with a 20 G  1 in  Gripper Plus Non-Coring safety needle. Was accessed on 1st attempt, blood return was noted. Labs drawn Port was flushed with 20 cc's (0.9% Sodium Chloride Injection USP) and locked with 500 units of heparin. Pt tolerated procedure well.  Patient RTC 01/27 for OV with Dr. Josh Benítez.

## 2022-01-21 LAB
ALBUMIN ELP: 3.4 GM/DL (ref 3.2–5.6)
ALBUMIN SERPL-MCNC: 3.9 GM/DL (ref 3.4–5)
ALP BLD-CCNC: 58 IU/L (ref 40–128)
ALPHA-1-GLOBULIN: 0.2 GM/DL (ref 0.1–0.4)
ALPHA-2-GLOBULIN: 0.7 GM/DL (ref 0.4–1.2)
ALT SERPL-CCNC: 26 U/L (ref 10–40)
ANION GAP SERPL CALCULATED.3IONS-SCNC: 14 MMOL/L (ref 4–16)
AST SERPL-CCNC: 24 IU/L (ref 15–37)
BETA GLOBULIN: 0.9 GM/DL (ref 0.5–1.3)
BILIRUB SERPL-MCNC: 0.3 MG/DL (ref 0–1)
BUN BLDV-MCNC: 23 MG/DL (ref 6–23)
CALCIUM SERPL-MCNC: 8.9 MG/DL (ref 8.3–10.6)
CHLORIDE BLD-SCNC: 106 MMOL/L (ref 99–110)
CO2: 21 MMOL/L (ref 21–32)
CREAT SERPL-MCNC: 0.8 MG/DL (ref 0.6–1.1)
GAMMA GLOBULIN: 0.4 GM/DL (ref 0.5–1.6)
GFR AFRICAN AMERICAN: >60 ML/MIN/1.73M2
GFR NON-AFRICAN AMERICAN: >60 ML/MIN/1.73M2
GLUCOSE BLD-MCNC: 179 MG/DL (ref 70–99)
LACTATE DEHYDROGENASE: 183 IU/L (ref 120–246)
POTASSIUM SERPL-SCNC: 4 MMOL/L (ref 3.5–5.1)
SODIUM BLD-SCNC: 141 MMOL/L (ref 135–145)
SPEP INTERPRETATION: ABNORMAL
SPEP INTERPRETATION: NORMAL
TOTAL PROTEIN: 5.6 GM/DL (ref 6.4–8.2)
TOTAL PROTEIN: 5.6 GM/DL (ref 6.4–8.2)

## 2022-01-23 LAB
BETA-2 MICROGLOBULIN: 2.5 MG/L (ref 1.1–2.4)
KAPPA QUANT FREE LIGHT CHAINS: 39.58 MG/L (ref 3.3–19.4)
KAPPA/LAMBDA FREE LIGHT CHAIN RATIO: 3.57 (ref 0.26–1.65)
LAMBDA FREE LIGHT CHAINS QNT: 11.1 MG/L (ref 5.71–26.3)

## 2022-01-23 NOTE — PROGRESS NOTES
Patient Name: Monalisa Huitron  Patient : 1945  Patient MRN: [id number]     Primary Oncologist: Hill Lezama MD  Referring Provider: Jeremie Layne MD     Date of Service: 2022      Chief Complaint:   Chief Complaint   Patient presents with    Follow-up     Patient Active Problem List:     Restless leg syndrome     Hypertension     Diabetes mellitus with neuropathy ([address]     Malignant lymphoplasmacytic lymphoma ([facility] [address]     Osteopenia     Gastroesophageal reflux disease without esophagitis     Insomnia     Osteoporosis    HPI:   Ms. Seth Barraza is a 60-year-old very pleasant patient with medical history significant for hypertension, diabetes mellitus, uterine cancer status post hysterectomy in , and chronic kidney disease and discoid lupus erythematosus of eyelid, initially referred to me on 2015, for evaluation of monoclonal gammopathy to rule out plasma cell dyscrasia. Ms. Seth Barraza stated that she was seen by Dr. Felipe Gilbert for proteinuria. Dr. Felipe Gilbert recognized that the patient has elevated gamma globulin on complete metabolic panel and he sent for serum protein electrophoresis and serum free light chain assay. She was found to have 3.17 grams of monoclonal protein on serum protein electrophoresis. Her serum Kappa light chain was more than 700 and Kappa Lambda ratio was more than 70. Because of significant monoclonal protein, she was subsequently referred to me for further evaluation. Laboratory workups done on 2015, showed 2.4 grams of IgM Kappa monoclonal protein on serum protein electrophoresis and immunofixation. LDH was 147, serum Kappa light chain was 121 mg/dL and Kappa Lambda ratio was 327.03. Bone marrow biopsy showed clonal B-cell population detected (26 percent of analyzed cells), CD5 negative, CD10 negative with Kappa light chain restriction. Plasma cells are essentially absent. FISH study was negative.       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 Additional workup showed IgM level of 5362 mg/dL, which is significantly elevated. She also has decreased IgG and IgA level. Ms. Dexter Kimble had CT scan of the chest, abdomen, and pelvis on August 11, 2015, and it showed no acute abnormality in the chest, abdomen, or pelvis. She has cholelithiasis, left kidney cyst, and mild bladder wall thickening which is non-specific. Since Ms. Dexter Kimble has hyperviscosity state, chemotherapy with fludarabine and Rituximab was started on August 24, 2015 and she has completed her fourth cycle on December 16, 2015. Screening mammogram on 10/29/2021 showed increasing microcalcifications in the right breast some of which could be vascular in etiology. Diagnostic right breast mammogram on 11/11/2021 showed mildly pleomorphic cluster of microcalcifications at the 9 o'clock position of the right breast for which further evaluation with stereotactic biopsy is recommended. 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 ER by IHC - positive (90% strong), PgR by IHC - positive (40% weak). She underwent bilateral mastectomy and right sentinel lymph node biopsy by Dr. Richar Haque on 1/3/2022. Pathology showed ductal carcinoma in situ. Microcalcifications are identified. Fibrocystic change is also noted and all the surgical margins are negative for malignancy. Size of DCIS was approximately 1.2 cm and it is grade 2. It has cribriform and comedo necrosis. Genetic counseling and testing showed that she is positive for heterozygous MSH6 mutation. On January 27, 2022, she presented to me for follow up. I have been seen by me for lymphoplasmacytic lymphoma (Waldenstrom macroglobulinemia) and she is status post four cycles of chemotherapy with fludarabine and rituximab.   She has been under close observation since she completed the chemotherapy. She achieved very good response to chemotherapy and her monoclonal protein has been quite stable since then. She does not have any signs or symptoms suggestive of hyperviscosity state and her monoclonal protein was only 200 mg/dL on serum protein electrophoresis done on 1/20/2022. Her IgM level was 252 mg/dL only. She has normal platelet count this time. 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 She has mild anemia and it could be due to blood loss during b/l mastectomy surgery. Since she is completely asymptomatic and her monoclonal protein has been stable, I recommend to continue with close observation. I will repeat her labs again in 4 months. 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 Since she is status post mastectomy, she doesn't need adjuvant radiation therapy. Since she had bilateral mastectomies for her DCIS, I do not offer adjuvant endocrine therapy or chemoprevention, as the risks/adverse events outweigh any potential benefit for risk reduction. I recommend for observation only. MSH6 mutation (Sampson syndrome) - discussed with her about risks of malignancy. Claritza Waddell will see her and to discuss more about testing for her family and preventive measures. Hypertension - on losartan/HCTZ. Diabetes - on insulin degludec and novolog. Hyperlipidemia - on zocor    She doesn't have any significant symptoms at today visit. PAST MEDICAL HISTORY:  Significant for:  1. Hypertension. 2.         Diabetes mellitus. 3.         Uterine cancer. 4.         Discoid lupus. PAST SURGICAL HISTORY:  Significant for:  1. Hysterectomy in 1988. FAMILY HISTORY:  Significant for breast cancer in her mother and liver cancer in her father. No other family history of cancer disease.     SOCIAL HISTORY:  She was a former smoker and she quit smoking in 1966. She denies alcohol drinking and illicit drug abuse. ALLERGIES:  She claims to have allergies to latex. No known drug allergies. Oncology History    No history exists. Review of Systems: \"Per interval history; otherwise 10 point ROS is negative. \"  Her energy level is good and appetite is stable. Her sleep is fine. She doesn't have fever, chills, night sweats, cough, shortness of breath, chest pain, hemoptysis or palpitations. Her bowel and bladder functions are normal. She denies nausea, vomiting, abdominal pain, diarrhea, constipation, dysuria, loss of appetit or weight loss. She doesn't have neuropathy and she denies bleeding or clotting issues. She doesn't have any pain in her body. Denies anxiety or depression. The rest of the systems are unremarkable. Vital Signs:  /66 (Site: Right Upper Arm, Position: Sitting, Cuff Size: Medium Adult)   Pulse 63   Temp 96.3 °F (35.7 °C) (Infrared)   Ht 5' 2\" (1.575 m)   Wt 148 lb 9.6 oz (67.4 kg)   SpO2 98%   BMI 27.18 kg/m²     Physical Exam:  CONSTITUTIONAL: awake, alert, cooperative, no apparent distress   EYES: pupils equal, round and reactive to light, sclera clear and conjunctiva normal  ENT: Normocephalic, without obvious abnormality, atraumatic  NECK: supple, symmetrical, no jugular venous distension and no carotid bruits   HEMATOLOGIC/LYMPHATIC: no cervical, supraclavicular or axillary lymphadenopathy   LUNGS: VBS, no wheezes, no increased work of breathing, no crackles, clear to auscultation, no rhonchi,  CARDIOVASCULAR: regular rate and rhythm, normal S1 and S2, no murmur noted  ABDOMEN: normal bowel sounds x 4, soft, non-distended, non-tender, no masses palpated, no hepatosplenomegaly   MUSCULOSKELETAL: full range of motion noted, tone is normal  NEUROLOGIC: awake, alert, oriented to name, place and time. Motor skills grossly intact.   SKIN: Normal skin color, texture, turgor and no jaundice.  appears intact   EXTREMITIES: no LE edema, no clubbing, no cyanosis, no leg swelling,   BREASTS: no palpable mass in bilateral breasts and axilla      Labs:  Hematology:  Lab Results   Component Value Date    WBC 4.5 01/20/2022    RBC 3.55 (L) 01/20/2022    HGB 11.8 (L) 01/20/2022    HCT 35.0 (L) 01/20/2022    MCV 98.6 01/20/2022    MCH 33.2 (H) 01/20/2022    MCHC 33.7 01/20/2022    RDW 15.0 (H) 01/20/2022     01/20/2022    MPV 9.1 01/20/2022    BANDSPCT 6 07/23/2015    SEGSPCT 53.3 01/20/2022    EOSRELPCT 7.5 (H) 01/20/2022    BASOPCT 0.9 01/20/2022    LYMPHOPCT 28.6 01/20/2022    MONOPCT 9.7 (H) 01/20/2022    BANDABS 0.29 07/23/2015    SEGSABS 2.4 01/20/2022    EOSABS 0.3 01/20/2022    BASOSABS 0.0 01/20/2022    LYMPHSABS 1.3 01/20/2022    MONOSABS 0.4 01/20/2022    DIFFTYPE AUTOMATED DIFFERENTIAL 01/20/2022    WBCMORP OCCASIONAL 07/23/2015     Lab Results   Component Value Date    ESR 6 07/19/2021     Chemistry:  Lab Results   Component Value Date     01/20/2022    K 4.0 01/20/2022     01/20/2022    CO2 21 01/20/2022    BUN 23 01/20/2022    CREATININE 0.8 01/20/2022    GLUCOSE 179 (H) 01/20/2022    CALCIUM 8.9 01/20/2022    PROT 5.6 (L) 01/20/2022    PROT 5.6 (L) 01/20/2022    LABALBU 3.9 01/20/2022    BILITOT 0.3 01/20/2022    ALKPHOS 58 01/20/2022    AST 24 01/20/2022    ALT 26 01/20/2022    LABGLOM >60 01/20/2022    GFRAA >60 01/20/2022    AGRATIO 1.9 08/16/2016    GLOB 2.2 08/16/2016    MG 2.2 03/15/2021    POCGLU 171 (H) 03/18/2021     Lab Results   Component Value Date     01/20/2022     No components found for: LD  Lab Results   Component Value Date    TSHHS 1.120 08/31/2015     Immunology:  Lab Results   Component Value Date    PROT 5.6 (L) 01/20/2022    PROT 5.6 (L) 01/20/2022    SPEP  01/20/2022     INTERPRETATION - Monoclonal gammopathy, IgM kappa.   Memo Wheeler MD    SPEP  01/20/2022     INTERPRETATION - Monoclonal gammopathy, 200 mg/dL, identified as IgM kappa on concurrent MANJIT, which has remained stable since 7/19/21. Jacob Morris MD    ALBUMINELP 3.4 01/20/2022    LABALPH 0.2 01/20/2022    LABALPH 0.7 01/20/2022    LABBETA 0.9 01/20/2022    GAMGLOB 0.4 (L) 01/20/2022     Lab Results   Component Value Date    KAPPAUVOL 39.58 (H) 01/20/2022    LAMBDAUVOL 0.61 12/07/2015    KLFLCR 3.57 (H) 01/20/2022     Lab Results   Component Value Date    B2M 2.5 (H) 01/20/2022     Coagulation Panel:  Lab Results   Component Value Date    PROTIME 13.0 03/15/2021    INR 1.07 03/15/2021    APTT 31.8 08/17/2015    DDIMER 711 (H) 08/31/2015     Anemia Panel:  Lab Results   Component Value Date    MKLFSCKH53 642.9 08/31/2015    FOLATE 17.3 08/31/2015     Tumor Markers:  No results found for: , CEA, , LABCA2, PSA  Observations:  No data recorded        Assessment & Plan:   Lymphoplasmacytic lymphoma (Waldenstrom's macroglobulinemia). PLAN:  Magalys Lozano has been followed for lymphoplasmacytic lymphoma (Waldenstrom macroglobulinemia) and she is status post four cycles of chemotherapy with fludarabine and rituximab. Screening mammogram on 10/29/2021 showed increasing microcalcifications in the right breast some of which could be vascular in etiology. Diagnostic right breast mammogram on 11/11/2021 showed mildly pleomorphic cluster of microcalcifications at the 9 o'clock position of the right breast for which further evaluation with stereotactic biopsy is recommended. 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 ER by IHC - positive (90% strong), PgR by IHC - positive (40% weak). She underwent bilateral mastectomy and right sentinel lymph node biopsy by Dr. Hamilton Jarquin on 1/3/2022. Pathology showed ductal carcinoma in situ. Microcalcifications are identified. Fibrocystic change is also noted and all the surgical margins are negative for malignancy. Size of DCIS was approximately 1.2 cm and it is grade 2.  It has cribriform and regular basis. Recent imaging and labs were reviewed and discussed with the patient.

## 2022-01-27 ENCOUNTER — OFFICE VISIT (OUTPATIENT)
Dept: ONCOLOGY | Age: 77
End: 2022-01-27
Payer: MEDICARE

## 2022-01-27 ENCOUNTER — HOSPITAL ENCOUNTER (OUTPATIENT)
Dept: INFUSION THERAPY | Age: 77
Discharge: HOME | End: 2022-01-27

## 2022-01-27 VITALS
HEIGHT: 62 IN | DIASTOLIC BLOOD PRESSURE: 66 MMHG | BODY MASS INDEX: 27.34 KG/M2 | HEART RATE: 63 BPM | SYSTOLIC BLOOD PRESSURE: 116 MMHG | WEIGHT: 148.6 LBS | OXYGEN SATURATION: 98 % | TEMPERATURE: 96.3 F

## 2022-01-27 DIAGNOSIS — D05.11 DUCTAL CARCINOMA IN SITU (DCIS) OF RIGHT BREAST: Primary | ICD-10-CM

## 2022-01-27 PROCEDURE — 99214 OFFICE O/P EST MOD 30 MIN: CPT | Performed by: INTERNAL MEDICINE

## 2022-01-27 PROCEDURE — 1090F PRES/ABSN URINE INCON ASSESS: CPT | Performed by: INTERNAL MEDICINE

## 2022-01-27 PROCEDURE — G8417 CALC BMI ABV UP PARAM F/U: HCPCS | Performed by: INTERNAL MEDICINE

## 2022-01-27 PROCEDURE — 1036F TOBACCO NON-USER: CPT | Performed by: INTERNAL MEDICINE

## 2022-01-27 PROCEDURE — G8427 DOCREV CUR MEDS BY ELIG CLIN: HCPCS | Performed by: INTERNAL MEDICINE

## 2022-01-27 PROCEDURE — G8399 PT W/DXA RESULTS DOCUMENT: HCPCS | Performed by: INTERNAL MEDICINE

## 2022-01-27 PROCEDURE — G8484 FLU IMMUNIZE NO ADMIN: HCPCS | Performed by: INTERNAL MEDICINE

## 2022-01-27 PROCEDURE — 1123F ACP DISCUSS/DSCN MKR DOCD: CPT | Performed by: INTERNAL MEDICINE

## 2022-01-27 PROCEDURE — 4040F PNEUMOC VAC/ADMIN/RCVD: CPT | Performed by: INTERNAL MEDICINE

## 2022-01-27 PROCEDURE — 3017F COLORECTAL CA SCREEN DOC REV: CPT | Performed by: INTERNAL MEDICINE

## 2022-01-27 NOTE — PROGRESS NOTES
MA Rooming Questions  Patient: Dharmesh Lam  MRN: [id number]    Date: 1/27/2022        1. Do you have any new issues?   no         2. Do you need any refills on medications?    no    3. Have you had any imaging done since your last visit?   no    4. Have you been hospitalized or seen in the emergency room since your last visit here?   yes - [facility] for double mastectomy surgery    5. Did the patient have a depression screening completed today?  No    No data recorded     PHQ-9 Given to (if applicable):               PHQ-9 Score (if applicable):                     [] Positive     []  Negative              Does question #9 need addressed (if applicable)                     [] Yes    []  No               Keiry Hurd CMA

## 2022-01-27 NOTE — PROGRESS NOTES
Reviewed MSH6 mutation with patient. She will be seen in office for full review of surveillance and cascade testing. She is given a copy of her results today.

## 2022-01-27 NOTE — LETTER
[address] [place] [address] [facility] [address]  Phone: [phone]  Fax: [phone]           Francisco Zapien MD      January 27, 2022     Patient: Nelida Perez   MR Number: [id number]   YOB: 1945   Date of Visit: 1/27/2022       Dear Dr. Rose Reynolds ref. provider found: Thank you for referring Donny Sidhu to me for evaluation/treatment. Below are the relevant portions of my assessment and plan of care. If you have questions, please do not hesitate to call me. I look forward to following Michell Aden along with you.     Sincerely,        Francisco Zapien MD    CC providers:  MD Matt Baron [address]  [place] [id number]  Via In [address] [place] [address], MD  [address] [place] [address] [address] [facility]  V[address] [place]

## 2022-02-02 ENCOUNTER — OFFICE VISIT (OUTPATIENT)
Dept: ONCOLOGY | Age: 77
End: 2022-02-02
Payer: MEDICARE

## 2022-02-02 ENCOUNTER — HOSPITAL ENCOUNTER (OUTPATIENT)
Dept: INFUSION THERAPY | Age: 77
Discharge: HOME | End: 2022-02-02

## 2022-02-02 DIAGNOSIS — D05.11 DUCTAL CARCINOMA IN SITU (DCIS) OF RIGHT BREAST: ICD-10-CM

## 2022-02-02 DIAGNOSIS — Z71.83 ENCOUNTER FOR NONPROCREATIVE GENETIC COUNSELING: Primary | ICD-10-CM

## 2022-02-02 DIAGNOSIS — Z80.3 FAMILY HISTORY OF BREAST CANCER: ICD-10-CM

## 2022-02-02 PROBLEM — Z15.09 MONOALLELIC MUTATION OF MSH6 GENE: Status: ACTIVE | Noted: 2022-02-02

## 2022-02-02 PROCEDURE — 4040F PNEUMOC VAC/ADMIN/RCVD: CPT | Performed by: NURSE PRACTITIONER

## 2022-02-02 PROCEDURE — G8427 DOCREV CUR MEDS BY ELIG CLIN: HCPCS | Performed by: NURSE PRACTITIONER

## 2022-02-02 PROCEDURE — G8399 PT W/DXA RESULTS DOCUMENT: HCPCS | Performed by: NURSE PRACTITIONER

## 2022-02-02 PROCEDURE — 1036F TOBACCO NON-USER: CPT | Performed by: NURSE PRACTITIONER

## 2022-02-02 PROCEDURE — G8484 FLU IMMUNIZE NO ADMIN: HCPCS | Performed by: NURSE PRACTITIONER

## 2022-02-02 PROCEDURE — 3017F COLORECTAL CA SCREEN DOC REV: CPT | Performed by: NURSE PRACTITIONER

## 2022-02-02 PROCEDURE — G8417 CALC BMI ABV UP PARAM F/U: HCPCS | Performed by: NURSE PRACTITIONER

## 2022-02-02 PROCEDURE — 1123F ACP DISCUSS/DSCN MKR DOCD: CPT | Performed by: NURSE PRACTITIONER

## 2022-02-02 PROCEDURE — 1090F PRES/ABSN URINE INCON ASSESS: CPT | Performed by: NURSE PRACTITIONER

## 2022-02-02 PROCEDURE — 99215 OFFICE O/P EST HI 40 MIN: CPT | Performed by: NURSE PRACTITIONER

## 2022-02-02 NOTE — PROGRESS NOTES
GENETIC COUNSELING     2/2/22  Jamarcus Tinoco  1945  77 y.o. Referred By:  Dr. Eli Perez  Referred For: Initial genetic risk evaluation and testing    SUMMARY:  Bonnie Navarrete was referred for genetic counseling today due to personal and family history of cancer. She meets NCCN guidelines for genetic testing, so after informed consent, blood was drawn for the 36 gene panel through [facility] lab. Health History:  Personal Summary (cancer history, cancer screening, hormonal risk factors):     - history of uterine cancer in s/p hysterectomy and removal of one ovary (age 44)    Diagnosed with right breast DCIS s/p bilateral mastectomy. Pathology with ER/RI postiive HER 2 positive Grade 2 DCIS. She was offered active surveillance.     She denies previous genetic testing  No known mutation  No AJ heritage  Not adopted nor a twin   First colonoscopy at age 49, first polyp age 62; five lifetime polyps  Smoked ages [age] 4 ppd  Occasional smoker  Menarche 16, menopause surgical at 40  3 pregnancies, 3 live births  First child at 24, no breastfeeding  -    Surgical History:    Past Surgical History:   Procedure Laterality Date    HAND SURGERY  12/2016    HYSTERECTOMY      LUMBAR LAMINECTOMY N/A 3/16/2021    MINIMALLY INVASIVE LEFT L3-4 ANTERIOR TO PSOAS DISECTOMY AND ARTHODESIS TITANIUM INTERBODY DEVICE , MORSELIZD SYNTHETIC ALLOGRAFT POSTERIOR L3-4 INTSTRUMENTATION AND FUSION , INTROPERATIVE FLUROSCOPY AND NEURO MONITORING performed by Evelyn Ontiveros MD at [place] [address] [facility] BREAST BIOPSY RIGHT Right 11/23/2021    [facility] BREAST BIOPSY RIGHT 11/23/2021 Renato Ribera MD [address] [place] [address]    TUBAL LIGATION          Medical Problems: Patient Active Problem List:     Restless leg syndrome     Hypertension     Diabetes mellitus with neuropathy (HCC)     Malignant lymphoplasmacytic lymphoma (HCC)     Osteopenia     Gastroesophageal reflux disease without esophagitis     Insomnia Osteoporosis     Intractable back pain     Hyponatremia     Ductal carcinoma in situ (DCIS) of right breast     Monoallelic mutation of MSH6 gene       Family History:  A three-generation pedigree was obtained today and will be saved with the patient's record. Patient's family history is significant for the following:  See pedigree for information about family structure and unaffected relatives. Mother- breast cancer diagnosed 46,  47  Father- colon cancer dx 79,  80  Three children, 2 sons, one daughter, unaffected  Sister- breast cancer dx 62, metastatic to liver,  at 61  Maternal aunt- breast cancer: dx 80,  80  Maternal first cousin- breast cancer  Paternal first cousin- uterine, living at 72    Risk Assessment:  R Aruna Catalan 74 (NCCN) criteria for genetic testing based on personal and family history. Overall, there is a high risk of having a hereditary carncer syndrome. We discussed high risk syndromes such as garcia, as well as other more moderate risk genetic mutations that could have contributed to the cancer in the family. Garcia Syndrome  Garcia Syndrome is a genetic condition characterized by early onset colorectal cancer and endometrial cancer. It is associated with an elevated risk of other cancers such as gastric, ovarian, urinary tract, small brenda, brain and pancreatic cancer. Garcia syndrome is caused by a genetic change, or mutation, in one of five known genes:  MLH1, MSH2, EPCAM, MSH6, or PMS2. If a person has a mutation in one of these genes, they face increased cancer risks and also have a 50% chance of passing the mutation down to their children. There are national guidelines that have recommendations for medical management and/or preventative options.     Genetic Testing  Many laboratories are now using next generation sequencing to test a panel of cancer genes at the same time - this reduces the turnaround time and cost compared to the single gene approach to testing. In our discussion, we discussed three possible test results: positive, negative, and indeterminate, (variant of unknown significance). We addressed the 2008 federal legislation, ALON, which protects individuals from discrimination in health insurance and employment. More information is available at www. GINAhelp.org. The pros and cons of panel testing were reviewed, including the fact that there are some genes that do not have well-defined cancer risks or recommendations. A psychological assessment was performed, and the patient understands how the results will be incorporated into medical management, as well as potential implications for family members. Consent: All elements of the informed consent were discussed with Abagail Babinski and signed consent was obtained for the gene panel. Medical decision making was of high complexity, as it included a comprehensive discussion of all relevant options for genetic testing and implications for patient and family members. The patient was educated that the lab will verify insurance coverage before initiating testing and call if there is out-of-pocket for any reason. Patient was educated that results are expected to be available in approximately 3 weeks and will then be contacted. Patient labs were previously drawn, with results available today. RESULTS:   Pathogenic mutation in MSH6 [id number]                   Plan:  1. Plan for colonoscopy. Referred to Gynecologist for surveillance for ovarian cancer- she has one ovary remaining. 2. Copies of report provided to patient. We reviewed need for cascade testing for her children, remaining sister and children of her siblings. 3. Encouraged to inform of any new  Cancers in family  4.  There may be an entirely different cancer risk gene involved in your family for which testing is not yet available

## 2022-02-28 ENCOUNTER — INITIAL CONSULT (OUTPATIENT)
Dept: OBGYN | Age: 77
End: 2022-02-28
Payer: MEDICARE

## 2022-02-28 VITALS
WEIGHT: 148.6 LBS | BODY MASS INDEX: 27.34 KG/M2 | HEIGHT: 62 IN | DIASTOLIC BLOOD PRESSURE: 73 MMHG | SYSTOLIC BLOOD PRESSURE: 161 MMHG

## 2022-02-28 DIAGNOSIS — Q99.9 GENETIC DEFECT: Primary | ICD-10-CM

## 2022-02-28 DIAGNOSIS — Z15.09 MONOALLELIC DELETION OF MSH6 GENE: ICD-10-CM

## 2022-02-28 PROCEDURE — 99203 OFFICE O/P NEW LOW 30 MIN: CPT | Performed by: OBSTETRICS & GYNECOLOGY

## 2022-02-28 PROCEDURE — 1036F TOBACCO NON-USER: CPT | Performed by: OBSTETRICS & GYNECOLOGY

## 2022-02-28 PROCEDURE — 1123F ACP DISCUSS/DSCN MKR DOCD: CPT | Performed by: OBSTETRICS & GYNECOLOGY

## 2022-02-28 PROCEDURE — G8484 FLU IMMUNIZE NO ADMIN: HCPCS | Performed by: OBSTETRICS & GYNECOLOGY

## 2022-02-28 PROCEDURE — 3017F COLORECTAL CA SCREEN DOC REV: CPT | Performed by: OBSTETRICS & GYNECOLOGY

## 2022-02-28 PROCEDURE — 4040F PNEUMOC VAC/ADMIN/RCVD: CPT | Performed by: OBSTETRICS & GYNECOLOGY

## 2022-02-28 PROCEDURE — G8417 CALC BMI ABV UP PARAM F/U: HCPCS | Performed by: OBSTETRICS & GYNECOLOGY

## 2022-02-28 PROCEDURE — 1090F PRES/ABSN URINE INCON ASSESS: CPT | Performed by: OBSTETRICS & GYNECOLOGY

## 2022-02-28 PROCEDURE — G8427 DOCREV CUR MEDS BY ELIG CLIN: HCPCS | Performed by: OBSTETRICS & GYNECOLOGY

## 2022-02-28 PROCEDURE — G8399 PT W/DXA RESULTS DOCUMENT: HCPCS | Performed by: OBSTETRICS & GYNECOLOGY

## 2022-02-28 SDOH — ECONOMIC STABILITY: FOOD INSECURITY: WITHIN THE PAST 12 MONTHS, YOU WORRIED THAT YOUR FOOD WOULD RUN OUT BEFORE YOU GOT MONEY TO BUY MORE.: NEVER TRUE

## 2022-02-28 SDOH — ECONOMIC STABILITY: TRANSPORTATION INSECURITY
IN THE PAST 12 MONTHS, HAS LACK OF TRANSPORTATION KEPT YOU FROM MEETINGS, WORK, OR FROM GETTING THINGS NEEDED FOR DAILY LIVING?: NO

## 2022-02-28 SDOH — ECONOMIC STABILITY: TRANSPORTATION INSECURITY
IN THE PAST 12 MONTHS, HAS THE LACK OF TRANSPORTATION KEPT YOU FROM MEDICAL APPOINTMENTS OR FROM GETTING MEDICATIONS?: NO

## 2022-02-28 SDOH — ECONOMIC STABILITY: FOOD INSECURITY: WITHIN THE PAST 12 MONTHS, THE FOOD YOU BOUGHT JUST DIDN'T LAST AND YOU DIDN'T HAVE MONEY TO GET MORE.: NEVER TRUE

## 2022-02-28 ASSESSMENT — ENCOUNTER SYMPTOMS
EYES NEGATIVE: 1
GASTROINTESTINAL NEGATIVE: 1
ALLERGIC/IMMUNOLOGIC NEGATIVE: 1
RESPIRATORY NEGATIVE: 1

## 2022-02-28 ASSESSMENT — SOCIAL DETERMINANTS OF HEALTH (SDOH): HOW HARD IS IT FOR YOU TO PAY FOR THE VERY BASICS LIKE FOOD, HOUSING, MEDICAL CARE, AND HEATING?: NOT VERY HARD

## 2022-02-28 NOTE — PROGRESS NOTES
22    Renée Nash  1945    Chief Complaint   Patient presents with    Advice Only     pt here for consult, pt referred by Julián Pritchett, pt dx w/ MSH6 gene on 22        Renée Nash is a 77 y.o. female who presents today for evaluation of genetic defect which increases risk of endometrial cancer (44% risk) and ovarian cancer (increased risk). She has no pain or bloating.     Past Medical History:   Diagnosis Date    Diabetes mellitus with neuropathy ([facility]     GERD (gastroesophageal reflux disease)     Hypertension     Malignant lymphoplasmacytic lymphoma ([facility] 2015    Dr Virgil Mendieta; Prachi Gory macroglobulinemia    RLS (restless legs syndrome)     Uterine cancer ([facility] [address] [facility]     AHMET, Radium implant; no recurrence       Past Surgical History:   Procedure Laterality Date    HAND SURGERY  2016    HYSTERECTOMY      LUMBAR LAMINECTOMY N/A 3/16/2021    MINIMALLY INVASIVE LEFT L3-4 ANTERIOR TO PSOAS DISECTOMY AND ARTHODESIS TITANIUM INTERBODY DEVICE , MORSELIZD SYNTHETIC ALLOGRAFT POSTERIOR L3-4 INTSTRUMENTATION AND FUSION , INTROPERATIVE FLUROSCOPY AND NEURO MONITORING performed by Janette Taveras MD at [place] [address] [facility] BREAST BIOPSY RIGHT Right 2021    [facility] BRE[facility] BI[facility] RIGHT 2021 Lazarus Paul, MD [address] [place] [address]    TUBAL LIGATION         Social History     Tobacco Use    Smoking status: Former Smoker     Packs/day: 1.00     Years: 31.00     Pack years: 31.00     Types: Cigarettes     Start date: 1965     Quit date: 1996     Years since quittin.0    Smokeless tobacco: Never Used   Substance Use Topics    Alcohol use: No     Alcohol/week: 0.0 standard drinks    Drug use: No       Family History   Problem Relation Age of Onset    Cancer Mother 46        breast cancer,  @ age 47    Breast Cancer Mother     Asthma Father     Cancer Father [de-identified]        colon cancer    High Blood Pressure Father     High Blood Pressure Sister     Breast Cancer Sister     Vision Loss Maternal Grandmother         glaucoma    Arthritis Sister     Diabetes Sister     Cancer Sister 61        breast, then mastitized to liver,  from complications    High Blood Pressure Sister     Diabetes Sister     High Blood Pressure Sister        Current Outpatient Medications   Medication Sig Dispense Refill    HYDROcodone-acetaminophen ([address]) 5-325 MG per tablet       dapagliflozin (FARXIGA) 10 MG tablet Take 1 tablet by mouth daily      valsartan-hydroCHLOROthiazide (DIOVAN-HCT) 160-12.5 MG per tablet Take 1 tablet by mouth daily      B Complex Vitamins (VITAMIN B COMPLEX PO) Take 1 tablet by mouth 2 times daily      omeprazole (PRILOSEC) 20 MG delayed release capsule Take 20 mg by mouth daily OTC      simvastatin (ZOCOR) 5 MG tablet Take 5 mg by mouth nightly       Insulin Pen Needle (PEN NEEDLES) 31G X 8 MM MISC 1 pen by Does not apply route 2 times daily 180 each 3    ONE TOUCH ULTRA TEST strip TO CHECK BLOOD SUGAR 4X DAILY WITH CURRENT GLUCOMETER: DX: DIABETES TYPE II E11.9 100 strip 11    Multiple Vitamins-Minerals (THERAPEUTIC MULTIVITAMIN-MINERALS) tablet Take 1 tablet by mouth daily      acetaminophen (TYLENOL) 500 MG tablet Take 500 mg by mouth every 6 hours as needed for Pain      fluticasone (VERAMYST) 27.5 MCG/SPRAY nasal spray 2 sprays by Nasal route daily      insulin aspart (NOVOLOG FLEXPEN) 100 UNIT/ML injection pen Inject 20 Units into the skin 3 times daily (before meals) 5 pen 5    Insulin Degludec (TRESIBA FLEXTOUCH) 200 UNIT/ML SOPN Inject 20 Units into the skin three times daily (Patient taking differently: Inject 40 Units into the skin daily ) 10 mL 5    gabapentin (NEURONTIN) 300 MG capsule Take 1 capsule by mouth 3 times daily for 30 days. . (Patient taking differently: Take 400 mg by mouth 3 times daily. ) 270 capsule 5    alendronate (FOSAMAX) 70 MG tablet Take 1 tablet by mouth every 7 days (Patient taking differently: Take 70 mg by mouth every 7 days Takes on ) 12 tablet 5    rOPINIRole (REQUIP) 2 MG tablet Take 1 tablet by mouth 2 times daily (Patient taking differently: Take 2 mg by mouth See Admin Instructions Takes at dinner and HS) 180 tablet 5    Glucose Blood (BLOOD GLUCOSE TEST STRIPS) STRP To check blood sugar 4x daily with current Glucometer:   DX: diabetes type .00 100 strip 11    pimecrolimus (ELIDEL) 1 % cream Apply topically 2 times daily Apply topically 2 times daily. 1 Tube 3    Blood Glucose Monitoring Suppl DOMINIQUE One device 1 Device 0    Lancets MISC Use 1 lancet 3 times daily 100 each 5    cycloSPORINE (RESTASIS) 0.05 % ophthalmic emulsion Place 1 drop into both eyes 2 times daily       calcium carbonate (OSCAL) 500 MG TABS tablet Take 1 tablet by mouth daily       No current facility-administered medications for this visit. Facility-Administered Medications Ordered in Other Visits   Medication Dose Route Frequency Provider Last Rate Last Admin    sodium chloride flush 0.9 % injection 10 mL  10 mL IntraVENous BID Jed Gupta MD           Allergies   Allergen Reactions    Latex     Triamterene            Immunization History   Administered Date(s) Administered    Influenza Virus Vaccine 2016    Influenza, High Dose (Fluzone 65 yrs and older) 2016, 2017, 10/02/2018, 2019    Influenza, High-dose, Quadv, 65 yrs +, IM (Fluzone) 2020    Pneumococcal Conjugate 13-valent ([id number]) 2017    Pneumococcal Polysaccharide ([id number]) 10/05/2012, 2016    Zoster Live (Zostavax) 2014    Zoster Recombinant (Shingrix) 2018, 2019       Review of Systems   Constitutional: Negative. Eyes: Negative. Respiratory: Negative. Cardiovascular: Negative. Gastrointestinal: Negative. Endocrine: Negative. Genitourinary: Negative. Musculoskeletal: Negative. Skin: Negative.     Allergic/Immunologic: Negative. Neurological: Negative. Hematological: Negative. Psychiatric/Behavioral: Negative. BP (!) 161/73   Ht 5' 2\" (1.575 m)   Wt 148 lb 9.6 oz (67.4 kg)   BMI 27.18 kg/m²     Physical Exam  Constitutional:       General: She is not in acute distress. Appearance: Normal appearance. She is not ill-appearing. HENT:      Head: Normocephalic. Nose: Nose normal.   Eyes:      General: No scleral icterus. Right eye: No discharge. Left eye: No discharge. Cardiovascular:      Pulses: Normal pulses. Pulmonary:      Effort: Pulmonary effort is normal.   Abdominal:      General: Abdomen is flat. There is no distension. Palpations: Abdomen is soft. There is no mass. Tenderness: There is no abdominal tenderness. Hernia: No hernia is present. Musculoskeletal:         General: Normal range of motion. Cervical back: Normal range of motion and neck supple. No rigidity. Skin:     General: Skin is warm and dry. Neurological:      General: No focal deficit present. Mental Status: She is alert and oriented to person, place, and time. Psychiatric:         Mood and Affect: Mood normal.         Behavior: Behavior normal.         No results found for this visit on 02/28/22. Reviewed genetic report  ASSESSMENT AND PLAN   Diagnosis Orders   1. Genetic defect  US NON OB TRANSVAGINAL    US NON OB TRANSVAGINAL   2. Monoallelic deletion of MSH6 gene       Discussed options of surgical removal of her remaining ovary versus ultrasound evaluations. She desires ultrasound evaluation now and in 6m. Return in about 6 months (around 8/28/2022).     Raulito Chery MD

## 2022-04-14 ENCOUNTER — TELEPHONE (OUTPATIENT)
Dept: INFUSION THERAPY | Age: 77
End: 2022-04-14

## 2022-04-14 NOTE — TELEPHONE ENCOUNTER
LVM informing pt we need to cancel her port flush on 4/20 due to unexpected staffing issues; pt has a scheduled port draw in May & asked for a return call if she has any questions.

## 2022-05-20 ENCOUNTER — HOSPITAL ENCOUNTER (OUTPATIENT)
Dept: INFUSION THERAPY | Age: 77
Discharge: HOME | End: 2022-05-20
Payer: MEDICARE

## 2022-05-20 DIAGNOSIS — D05.11 DUCTAL CARCINOMA IN SITU (DCIS) OF RIGHT BREAST: Primary | ICD-10-CM

## 2022-05-20 DIAGNOSIS — C83.00 MALIGNANT LYMPHOPLASMACYTIC LYMPHOMA (HCC): ICD-10-CM

## 2022-05-20 LAB
ALBUMIN SERPL-MCNC: 4.1 GM/DL (ref 3.4–5)
ALP BLD-CCNC: 55 IU/L (ref 40–128)
ALT SERPL-CCNC: 19 U/L (ref 10–40)
ANION GAP SERPL CALCULATED.3IONS-SCNC: 15 MMOL/L (ref 4–16)
AST SERPL-CCNC: 22 IU/L (ref 15–37)
BASOPHILS ABSOLUTE: 0 K/CU MM
BASOPHILS RELATIVE PERCENT: 0.2 % (ref 0–1)
BILIRUB SERPL-MCNC: 0.5 MG/DL (ref 0–1)
BUN BLDV-MCNC: 20 MG/DL (ref 6–23)
CALCIUM SERPL-MCNC: 9.3 MG/DL (ref 8.3–10.6)
CHLORIDE BLD-SCNC: 99 MMOL/L (ref 99–110)
CO2: 24 MMOL/L (ref 21–32)
CREAT SERPL-MCNC: 0.8 MG/DL (ref 0.6–1.1)
DIFFERENTIAL TYPE: ABNORMAL
EOSINOPHILS ABSOLUTE: 0.2 K/CU MM
EOSINOPHILS RELATIVE PERCENT: 3.4 % (ref 0–3)
ERYTHROCYTE SEDIMENTATION RATE: 1 MM/HR (ref 0–30)
GFR AFRICAN AMERICAN: >60 ML/MIN/1.73M2
GFR NON-AFRICAN AMERICAN: >60 ML/MIN/1.73M2
GLUCOSE BLD-MCNC: 151 MG/DL (ref 70–99)
HCT VFR BLD CALC: 40.4 % (ref 37–47)
HEMOGLOBIN: 13.8 GM/DL (ref 12.5–16)
IGA: 215 MG/DL (ref 69–382)
IGG,SERUM: <300 MG/DL (ref 723–1685)
IGM,SERUM: 266 MG/DL (ref 62–277)
LACTATE DEHYDROGENASE: 201 IU/L (ref 120–246)
LYMPHOCYTES ABSOLUTE: 1.4 K/CU MM
LYMPHOCYTES RELATIVE PERCENT: 31.1 % (ref 24–44)
MCH RBC QN AUTO: 32.5 PG (ref 27–31)
MCHC RBC AUTO-ENTMCNC: 34.2 % (ref 32–36)
MCV RBC AUTO: 95.3 FL (ref 78–100)
MONOCYTES ABSOLUTE: 0.3 K/CU MM
MONOCYTES RELATIVE PERCENT: 7.6 % (ref 0–4)
PDW BLD-RTO: 14.9 % (ref 11.7–14.9)
PLATELET # BLD: 140 K/CU MM (ref 140–440)
PMV BLD AUTO: 9.2 FL (ref 7.5–11.1)
POTASSIUM SERPL-SCNC: 3.5 MMOL/L (ref 3.5–5.1)
RBC # BLD: 4.24 M/CU MM (ref 4.2–5.4)
SEGMENTED NEUTROPHILS ABSOLUTE COUNT: 2.6 K/CU MM
SEGMENTED NEUTROPHILS RELATIVE PERCENT: 57.7 % (ref 36–66)
SODIUM BLD-SCNC: 138 MMOL/L (ref 135–145)
TOTAL PROTEIN: 5.7 GM/DL (ref 6.4–8.2)
WBC # BLD: 4.5 K/CU MM (ref 4–10.5)

## 2022-05-20 PROCEDURE — 82232 ASSAY OF BETA-2 PROTEIN: CPT

## 2022-05-20 PROCEDURE — 84155 ASSAY OF PROTEIN SERUM: CPT

## 2022-05-20 PROCEDURE — 6360000002 HC RX W HCPCS: Performed by: INTERNAL MEDICINE

## 2022-05-20 PROCEDURE — 36591 DRAW BLOOD OFF VENOUS DEVICE: CPT

## 2022-05-20 PROCEDURE — 85025 COMPLETE CBC W/AUTO DIFF WBC: CPT

## 2022-05-20 PROCEDURE — 85652 RBC SED RATE AUTOMATED: CPT

## 2022-05-20 PROCEDURE — 86334 IMMUNOFIX E-PHORESIS SERUM: CPT

## 2022-05-20 PROCEDURE — 2580000003 HC RX 258: Performed by: INTERNAL MEDICINE

## 2022-05-20 PROCEDURE — 83615 LACTATE (LD) (LDH) ENZYME: CPT

## 2022-05-20 PROCEDURE — 86320 SERUM IMMUNOELECTROPHORESIS: CPT

## 2022-05-20 PROCEDURE — 82784 ASSAY IGA/IGD/IGG/IGM EACH: CPT

## 2022-05-20 PROCEDURE — 80053 COMPREHEN METABOLIC PANEL: CPT

## 2022-05-20 PROCEDURE — 83883 ASSAY NEPHELOMETRY NOT SPEC: CPT

## 2022-05-20 PROCEDURE — 84165 PROTEIN E-PHORESIS SERUM: CPT

## 2022-05-20 RX ORDER — HEPARIN SODIUM (PORCINE) LOCK FLUSH IV SOLN 100 UNIT/ML 100 UNIT/ML
500 SOLUTION INTRAVENOUS PRN
Status: DISCONTINUED | OUTPATIENT
Start: 2022-05-20 | End: 2022-05-21 | Stop reason: HOSPADM

## 2022-05-20 RX ORDER — SODIUM CHLORIDE 0.9 % (FLUSH) 0.9 %
10 SYRINGE (ML) INJECTION PRN
Status: DISCONTINUED | OUTPATIENT
Start: 2022-05-20 | End: 2022-05-21 | Stop reason: HOSPADM

## 2022-05-20 RX ORDER — SODIUM CHLORIDE 0.9 % (FLUSH) 0.9 %
10 SYRINGE (ML) INJECTION PRN
Status: CANCELLED | OUTPATIENT
Start: 2022-05-20

## 2022-05-20 RX ORDER — SODIUM CHLORIDE 0.9 % (FLUSH) 0.9 %
20 SYRINGE (ML) INJECTION PRN
Status: CANCELLED | OUTPATIENT
Start: 2022-05-20

## 2022-05-20 RX ORDER — SODIUM CHLORIDE 0.9 % (FLUSH) 0.9 %
20 SYRINGE (ML) INJECTION PRN
Status: DISCONTINUED | OUTPATIENT
Start: 2022-05-20 | End: 2022-05-21 | Stop reason: HOSPADM

## 2022-05-20 RX ORDER — HEPARIN SODIUM (PORCINE) LOCK FLUSH IV SOLN 100 UNIT/ML 100 UNIT/ML
500 SOLUTION INTRAVENOUS PRN
Status: CANCELLED | OUTPATIENT
Start: 2022-05-20

## 2022-05-20 RX ADMIN — SODIUM CHLORIDE, PRESERVATIVE FREE 10 ML: 5 INJECTION INTRAVENOUS at 13:30

## 2022-05-20 RX ADMIN — SODIUM CHLORIDE, PRESERVATIVE FREE 20 ML: 5 INJECTION INTRAVENOUS at 13:35

## 2022-05-20 RX ADMIN — HEPARIN 500 UNITS: 100 SYRINGE at 13:35

## 2022-05-20 NOTE — PROGRESS NOTES
Pt. Here for blood work prior to office visit next week. Right upper chest mediport accessed without difficulty, good blood return noted. Lab work drawn as ordered. Mediport flushed per protocol.

## 2022-05-22 LAB
BETA-2 MICROGLOBULIN: 2.5 MG/L
KAPPA QUANT FREE LIGHT CHAINS: 42.93 MG/L (ref 3.3–19.4)
KAPPA/LAMBDA FREE LIGHT CHAIN RATIO: 4.48 (ref 0.26–1.65)
LAMBDA FREE LIGHT CHAINS QNT: 9.59 MG/L (ref 5.71–26.3)

## 2022-05-24 LAB
ALBUMIN SERPL-MCNC: 3.82 G/DL (ref 3.75–5.01)
ALPHA-1-GLOBULIN: 0.29 G/DL (ref 0.19–0.46)
ALPHA-2-GLOBULIN: 0.63 G/DL (ref 0.48–1.05)
BETA GLOBULIN: 0.7 G/DL (ref 0.48–1.1)
GAMMA: 0.45 G/DL (ref 0.62–1.51)
IMMUNOFIXATION REFLEX: ABNORMAL
PROTEIN ELECTROPHORESIS, SERUM: ABNORMAL
SPE/IFE INTERPRETATION: ABNORMAL
TOTAL PROTEIN: 5.9 G/DL (ref 6.3–8.2)

## 2022-05-27 ENCOUNTER — HOSPITAL ENCOUNTER (OUTPATIENT)
Dept: INFUSION THERAPY | Age: 77
Discharge: HOME | End: 2022-05-27

## 2022-05-27 ENCOUNTER — OFFICE VISIT (OUTPATIENT)
Dept: ONCOLOGY | Age: 77
End: 2022-05-27
Payer: MEDICARE

## 2022-05-27 VITALS
TEMPERATURE: 95.9 F | WEIGHT: 146.6 LBS | OXYGEN SATURATION: 97 % | DIASTOLIC BLOOD PRESSURE: 70 MMHG | HEIGHT: 62 IN | BODY MASS INDEX: 26.98 KG/M2 | SYSTOLIC BLOOD PRESSURE: 157 MMHG | HEART RATE: 65 BPM

## 2022-05-27 DIAGNOSIS — C83.00 MALIGNANT LYMPHOPLASMACYTIC LYMPHOMA (HCC): Primary | ICD-10-CM

## 2022-05-27 PROCEDURE — G8417 CALC BMI ABV UP PARAM F/U: HCPCS | Performed by: INTERNAL MEDICINE

## 2022-05-27 PROCEDURE — 99214 OFFICE O/P EST MOD 30 MIN: CPT | Performed by: INTERNAL MEDICINE

## 2022-05-27 PROCEDURE — 3017F COLORECTAL CA SCREEN DOC REV: CPT | Performed by: INTERNAL MEDICINE

## 2022-05-27 PROCEDURE — G8399 PT W/DXA RESULTS DOCUMENT: HCPCS | Performed by: INTERNAL MEDICINE

## 2022-05-27 PROCEDURE — 1090F PRES/ABSN URINE INCON ASSESS: CPT | Performed by: INTERNAL MEDICINE

## 2022-05-27 PROCEDURE — G8427 DOCREV CUR MEDS BY ELIG CLIN: HCPCS | Performed by: INTERNAL MEDICINE

## 2022-05-27 PROCEDURE — 1036F TOBACCO NON-USER: CPT | Performed by: INTERNAL MEDICINE

## 2022-05-27 PROCEDURE — 1123F ACP DISCUSS/DSCN MKR DOCD: CPT | Performed by: INTERNAL MEDICINE

## 2022-05-27 ASSESSMENT — PATIENT HEALTH QUESTIONNAIRE - PHQ9
SUM OF ALL RESPONSES TO PHQ QUESTIONS 1-9: 0
1. LITTLE INTEREST OR PLEASURE IN DOING THINGS: 0
2. FEELING DOWN, DEPRESSED OR HOPELESS: 0
SUM OF ALL RESPONSES TO PHQ9 QUESTIONS 1 & 2: 0

## 2022-05-27 NOTE — PROGRESS NOTES
MA Rooming Questions  Patient: Joe Meckel  MRN: [id number]    Date: 5/27/2022        1. Do you have any new issues?   no         2. Do you need any refills on medications?    no    3. Have you had any imaging done since your last visit?   no    4. Have you been hospitalized or seen in the emergency room since your last visit here?   no    5. Did the patient have a depression screening completed today?  Yes    PHQ-9 Total Score: 0 (5/27/2022  9:36 AM)       PHQ-9 Given to (if applicable):               PHQ-9 Score (if applicable):                     [] Positive     [x]  Negative              Does question #9 need addressed (if applicable)                     [] Yes    []  No               Napoleon Person CMA

## 2022-05-27 NOTE — PROGRESS NOTES
Patient Name: Eric Maynard  Patient : 1945  Patient MRN: [id number]     Primary Oncologist: Joann Hawk MD  Referring Provider: RORO Prasad CNP     Date of Service: 2022      Chief Complaint:   Chief Complaint   Patient presents with    Follow-up     Patient Active Problem List:     Restless leg syndrome     Hypertension     Diabetes mellitus with neuropathy ([address]     Malignant lymphoplasmacytic lymphoma ([address]     Osteopenia     Gastroesophageal reflux disease without esophagitis     Insomnia     Osteoporosis    HPI:   Ms. [facility] is a 67-year-old very pleasant patient with medical history significant for hypertension, diabetes mellitus, uterine cancer status post hysterectomy in , and chronic kidney disease and discoid lupus erythematosus of eyelid, initially referred to me on 2015, for evaluation of monoclonal gammopathy to rule out plasma cell dyscrasia. Ms. [facility] stated that she was seen by Dr. Ramona Pimentel for proteinuria. Dr. Ramona Pimentel recognized that the patient has elevated gamma globulin on complete metabolic panel and he sent for serum protein electrophoresis and serum free light chain assay. She was found to have 3.17 grams of monoclonal protein on serum protein electrophoresis. Her serum Kappa light chain was more than 700 and Kappa Lambda ratio was more than 70. Because of significant monoclonal protein, she was subsequently referred to me for further evaluation. Laboratory workups done on 2015, showed 2.4 grams of IgM Kappa monoclonal protein on serum protein electrophoresis and immunofixation. LDH was 147, serum Kappa light chain was 121 mg/dL and Kappa Lambda ratio was 327.03. Bone marrow biopsy showed clonal B-cell population detected (26 percent of analyzed cells), CD5 negative, CD10 negative with Kappa light chain restriction. Plasma cells are essentially absent. FISH study was negative.       Second opinion from the bone marrow Pathologist at the [facility] confirmed that Ms. Tj Díaz has lymphoplasmacytic lymphoma. Additional workup showed IgM level of 5362 mg/dL, which is significantly elevated. She also has decreased IgG and IgA level. Ms. Tj Díaz had CT scan of the chest, abdomen, and pelvis on August 11, 2015, and it showed no acute abnormality in the chest, abdomen, or pelvis. She has cholelithiasis, left kidney cyst, and mild bladder wall thickening which is non-specific. Since Ms. Tj Díaz has hyperviscosity state, chemotherapy with fludarabine and Rituximab was started on August 24, 2015 and she has completed her fourth cycle on December 16, 2015. Screening mammogram on 10/29/2021 showed increasing microcalcifications in the right breast some of which could be vascular in etiology. Diagnostic right breast mammogram on 11/11/2021 showed mildly pleomorphic cluster of microcalcifications at the 9 o'clock position of the right breast for which further evaluation with stereotactic biopsy is recommended. 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 ER by IHC - positive (90% strong), PgR by IHC - positive (40% weak). She underwent bilateral mastectomy and right sentinel lymph node biopsy by Dr. Michael Neil on 1/3/2022. Pathology showed ductal carcinoma in situ. Microcalcifications are identified. Fibrocystic change is also noted and all the surgical margins are negative for malignancy. Size of DCIS was approximately 1.2 cm and it is grade 2. It has cribriform and comedo necrosis. Genetic counseling and testing showed that she is positive for heterozygous MSH6 mutation. On May 27, 2022, she presented to me for follow up. I have been seen by me for lymphoplasmacytic lymphoma (Waldenstrom macroglobulinemia) and she is status post four cycles of chemotherapy with fludarabine and rituximab.   She has been under close observation since she completed the chemotherapy. She achieved very good response to chemotherapy and her monoclonal protein has been quite stable since then. She does not have any signs or symptoms suggestive of hyperviscosity state and her monoclonal protein was only 200 mg/dL on serum protein electrophoresis done on 5/20/2022. Her IgM level was 266 mg/dL only. She has normal platelet count this time. 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 She has normal hemoglobin on 5/20/22. Since she is completely asymptomatic and her monoclonal protein has been stable, I recommend to continue with close observation. I will repeat her labs again in 4 months. 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 Since she is status post mastectomy, she doesn't need adjuvant radiation therapy. Since she had bilateral mastectomies for her DCIS, I do not offer adjuvant endocrine therapy or chemoprevention, as the risks/adverse events outweigh any potential benefit for risk reduction. I recommend for observation only. MSH6 mutation (Sampson syndrome) - discussed with her about risks of malignancy. Mary Su will see her and to discuss more about testing for her family and preventive measures. Hypertension - on losartan/HCTZ. Diabetes - on insulin degludec and novolog. Hyperlipidemia - on zocor    Health maintenance - I recommend age-appropriate cancer screening, exercise, low-fat and low-sodium diet. She doesn't have any significant symptoms at today visit. PAST MEDICAL HISTORY:  Significant for:  1. Hypertension. 2.         Diabetes mellitus. 3.         Uterine cancer. 4.         Discoid lupus. PAST SURGICAL HISTORY:  Significant for:  1. Hysterectomy in 1988. FAMILY HISTORY:  Significant for breast cancer in her mother and liver cancer in her father.   No other family history of cancer disease. SOCIAL HISTORY:  She was a former smoker and she quit smoking in 1966. She denies alcohol drinking and illicit drug abuse. ALLERGIES:  She claims to have allergies to latex. No known drug allergies. Oncology History    No history exists. Review of Systems: \"Per interval history; otherwise 10 point ROS is negative. \"  Her energy level is stable and her sleep is fine. She doesn't have fever, chills, night sweats, cough, shortness of breath, chest pain, hemoptysis or palpitations. Her bowel and bladder functions are normal. She denies nausea, vomiting, abdominal pain, diarrhea, constipation, dysuria, loss of appetit or weight loss. She doesn't have neuropathy and she denies bleeding or clotting issues. She doesn't have any pain in her body. No anxiety or depression. The rest of the systems are unremarkable. Vital Signs:  BP (!) 157/70 (Site: Right Upper Arm, Position: Sitting, Cuff Size: Medium Adult)   Pulse 65   Temp (!) 95.9 °F (35.5 °C) (Infrared)   Ht 5' 2\" (1.575 m)   Wt 146 lb 9.6 oz (66.5 kg)   SpO2 97%   BMI 26.81 kg/m²     Physical Exam:  CONSTITUTIONAL: awake, alert, cooperative, no apparent distress   EYES: pupils equal, round and reactive to light, sclera clear and conjunctiva normal  ENT: Normocephalic, without obvious abnormality, atraumatic  NECK: supple, symmetrical, no jugular venous distension and no carotid bruits   HEMATOLOGIC/LYMPHATIC: no cervical, supraclavicular or axillary lymphadenopathy   LUNGS: VBS, no wheezes, no increased work of breathing, no crackles, clear to auscultation, no rhonchi,  CARDIOVASCULAR: regular rate and rhythm, normal S1 and S2, no murmur noted  ABDOMEN: normal bowel sounds x 4, soft, non-distended, non-tender, no masses palpated, no hepatosplenomegaly   MUSCULOSKELETAL: full range of motion noted, tone is normal  NEUROLOGIC: awake, alert, oriented to name, place and time.  Motor skills grossly intact.   SKIN: Normal skin color, texture, turgor and no jaundice. appears intact   EXTREMITIES: no LE edema, no leg swelling, no clubbing, no cyanosis,  BREASTS: no palpable mass in bilateral breasts and axilla      Labs:  Hematology:  Lab Results   Component Value Date    WBC 4.5 05/20/2022    RBC 4.24 05/20/2022    HGB 13.8 05/20/2022    HCT 40.4 05/20/2022    MCV 95.3 05/20/2022    MCH 32.5 (H) 05/20/2022    MCHC 34.2 05/20/2022    RDW 14.9 05/20/2022     05/20/2022    MPV 9.2 05/20/2022    BANDSPCT 6 07/23/2015    SEGSPCT 57.7 05/20/2022    EOSRELPCT 3.4 (H) 05/20/2022    BASOPCT 0.2 05/20/2022    LYMPHOPCT 31.1 05/20/2022    MONOPCT 7.6 (H) 05/20/2022    BANDABS 0.29 07/23/2015    SEGSABS 2.6 05/20/2022    EOSABS 0.2 05/20/2022    BASOSABS 0.0 05/20/2022    LYMPHSABS 1.4 05/20/2022    MONOSABS 0.3 05/20/2022    DIFFTYPE AUTOMATED DIFFERENTIAL 05/20/2022    WBCMORP OCCASIONAL 07/23/2015     Lab Results   Component Value Date    ESR 1 05/20/2022     Chemistry:  Lab Results   Component Value Date     05/20/2022    K 3.5 05/20/2022    CL 99 05/20/2022    CO2 24 05/20/2022    BUN 20 05/20/2022    CREATININE 0.8 05/20/2022    GLUCOSE 151 (H) 05/20/2022    CALCIUM 9.3 05/20/2022    PROT 5.7 (L) 05/20/2022    PROT 5.9 (L) 05/20/2022    LABALBU 4.1 05/20/2022    LABALBU 3.82 05/20/2022    BILITOT 0.5 05/20/2022    ALKPHOS 55 05/20/2022    AST 22 05/20/2022    ALT 19 05/20/2022    LABGLOM >60 05/20/2022    GFRAA >60 05/20/2022    AGRATIO 1.9 08/16/2016    GLOB 2.2 08/16/2016    MG 2.2 03/15/2021    POCGLU 171 (H) 03/18/2021     Lab Results   Component Value Date     05/20/2022     No components found for: LD  Lab Results   Component Value Date    TSHHS 1.120 08/31/2015     Immunology:  Lab Results   Component Value Date    PROT 5.7 (L) 05/20/2022    PROT 5.9 (L) 05/20/2022    SPEP  01/20/2022     INTERPRETATION - Monoclonal gammopathy, IgM kappa.   Luis Armando Morataya MD    SPE  01/20/2022     INTERPRETATION - Monoclonal gammopathy, 200 mg/dL, identified as IgM kappa on concurrent MANJIT, which has remained stable since 7/19/21. Mason Genao MD    ALBUMINELP 3.4 01/20/2022    LABALPH 0.29 05/20/2022    LABALPH 0.63 05/20/2022    LABBETA 0.70 05/20/2022    GAMGLOB 0.4 (L) 01/20/2022     Lab Results   Component Value Date    KAPPAUVOL 42.93 (H) 05/20/2022    LAMBDAUVOL 0.61 12/07/2015    KLFLCR 4.48 (H) 05/20/2022     Lab Results   Component Value Date    B2M 2.5 05/20/2022     Coagulation Panel:  Lab Results   Component Value Date    PROTIME 13.0 03/15/2021    INR 1.07 03/15/2021    APTT 31.8 08/17/2015    DDIMER 711 (H) 08/31/2015     Anemia Panel:  Lab Results   Component Value Date    WFLMPYFY33 642.9 08/31/2015    FOLATE 17.3 08/31/2015     Tumor Markers:  No results found for: , CEA, , LABCA2, PSA  Observations:  No data recorded        Assessment & Plan:   Lymphoplasmacytic lymphoma (Waldenstrom's macroglobulinemia). PLAN:  Tj Díaz has been followed for lymphoplasmacytic lymphoma (Waldenstrom macroglobulinemia) and she is status post four cycles of chemotherapy with fludarabine and rituximab. Screening mammogram on 10/29/2021 showed increasing microcalcifications in the right breast some of which could be vascular in etiology. Diagnostic right breast mammogram on 11/11/2021 showed mildly pleomorphic cluster of microcalcifications at the 9 o'clock position of the right breast for which further evaluation with stereotactic biopsy is recommended. 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 ER by IHC - positive (90% strong), PgR by IHC - positive (40% weak). She underwent bilateral mastectomy and right sentinel lymph node biopsy by Dr. Michael Neil on 1/3/2022. Pathology showed ductal carcinoma in situ. Microcalcifications are identified. Fibrocystic change is also noted and all the surgical margins are negative for malignancy.   Size of DCIS was approximately 1.2 cm and it is grade 2. It has cribriform and comedo necrosis. Genetic counseling and testing showed that she is positive for heterozygous MSH6 mutation. On May 27, 2022, she presented to me for follow up. I have been seen by me for lymphoplasmacytic lymphoma (Waldenstrom macroglobulinemia) and she is status post four cycles of chemotherapy with fludarabine and rituximab. She has been under close observation since she completed the chemotherapy. She achieved very good response to chemotherapy and her monoclonal protein has been quite stable since then. She does not have any signs or symptoms suggestive of hyperviscosity state and her monoclonal protein was only 200 mg/dL on serum protein electrophoresis done on 5/20/2022. Her IgM level was 266 mg/dL only. She has normal platelet count this time. 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 She has normal hemoglobin on 5/20/22. Since she is completely asymptomatic and her monoclonal protein has been stable, I recommend to continue with close observation. I will repeat her labs again in 4 months. 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 Since she is status post mastectomy, she doesn't need adjuvant radiation therapy. Since she had bilateral mastectomies for her DCIS, I do not offer adjuvant endocrine therapy or chemoprevention, as the risks/adverse events outweigh any potential benefit for risk reduction. I recommend for observation only. MSH6 mutation (Sampson syndrome) - discussed with her about risks of malignancy. Brenna Emerson will see her and to discuss more about testing for her family and preventive measures. Hypertension - on losartan/HCTZ. Diabetes - on insulin degludec and novolog.    Hyperlipidemia - on zocor    Health maintenance - I recommend age-appropriate cancer screening, exercise, low-fat and low-sodium diet. I answered all her questions and concerns for today. I asked her to follow up with primary care physician on regular basis. Recent imaging and labs were reviewed and discussed with the patient.

## 2022-08-02 LAB
EER IMMUNOFIX ELECTROPHORESIS GEL: NORMAL
IMMUNOFIX ELECTROPHORESIS GEL: NORMAL

## 2022-09-21 ENCOUNTER — HOSPITAL ENCOUNTER (OUTPATIENT)
Dept: INFUSION THERAPY | Age: 77
Discharge: HOME | End: 2022-09-21
Payer: MEDICARE

## 2022-09-21 DIAGNOSIS — C83.00 MALIGNANT LYMPHOPLASMACYTIC LYMPHOMA (HCC): Primary | ICD-10-CM

## 2022-09-21 LAB
ALBUMIN SERPL-MCNC: 4 GM/DL (ref 3.4–5)
ALP BLD-CCNC: 58 IU/L (ref 40–129)
ALT SERPL-CCNC: 16 U/L (ref 10–40)
ANION GAP SERPL CALCULATED.3IONS-SCNC: 11 MMOL/L (ref 4–16)
AST SERPL-CCNC: 19 IU/L (ref 15–37)
BASOPHILS ABSOLUTE: 0 K/CU MM
BASOPHILS RELATIVE PERCENT: 0.4 % (ref 0–1)
BILIRUB SERPL-MCNC: 0.3 MG/DL (ref 0–1)
BUN BLDV-MCNC: 25 MG/DL (ref 6–23)
CALCIUM SERPL-MCNC: 8.9 MG/DL (ref 8.3–10.6)
CHLORIDE BLD-SCNC: 98 MMOL/L (ref 99–110)
CO2: 24 MMOL/L (ref 21–32)
CREAT SERPL-MCNC: 0.9 MG/DL (ref 0.6–1.1)
DIFFERENTIAL TYPE: ABNORMAL
EOSINOPHILS ABSOLUTE: 0.1 K/CU MM
EOSINOPHILS RELATIVE PERCENT: 1.8 % (ref 0–3)
ERYTHROCYTE SEDIMENTATION RATE: 4 MM/HR (ref 0–30)
GFR AFRICAN AMERICAN: >60 ML/MIN/1.73M2
GFR NON-AFRICAN AMERICAN: >60 ML/MIN/1.73M2
GLUCOSE BLD-MCNC: 283 MG/DL (ref 70–99)
HCT VFR BLD CALC: 38.4 % (ref 37–47)
HEMOGLOBIN: 13.2 GM/DL (ref 12.5–16)
IGA: 237 MG/DL (ref 69–382)
IGG,SERUM: <300 MG/DL (ref 723–1685)
IGM,SERUM: 244 MG/DL (ref 62–277)
LACTATE DEHYDROGENASE: 187 IU/L (ref 120–246)
LYMPHOCYTES ABSOLUTE: 1.3 K/CU MM
LYMPHOCYTES RELATIVE PERCENT: 25.9 % (ref 24–44)
MCH RBC QN AUTO: 32.8 PG (ref 27–31)
MCHC RBC AUTO-ENTMCNC: 34.4 % (ref 32–36)
MCV RBC AUTO: 95.5 FL (ref 78–100)
MONOCYTES ABSOLUTE: 0.3 K/CU MM
MONOCYTES RELATIVE PERCENT: 6.9 % (ref 0–4)
PDW BLD-RTO: 14.3 % (ref 11.7–14.9)
PLATELET # BLD: 139 K/CU MM (ref 140–440)
PMV BLD AUTO: 9.2 FL (ref 7.5–11.1)
POTASSIUM SERPL-SCNC: 3.6 MMOL/L (ref 3.5–5.1)
RBC # BLD: 4.02 M/CU MM (ref 4.2–5.4)
SEGMENTED NEUTROPHILS ABSOLUTE COUNT: 3.2 K/CU MM
SEGMENTED NEUTROPHILS RELATIVE PERCENT: 65 % (ref 36–66)
SODIUM BLD-SCNC: 133 MMOL/L (ref 135–145)
TOTAL PROTEIN: 5.5 GM/DL (ref 6.4–8.2)
WBC # BLD: 4.9 K/CU MM (ref 4–10.5)

## 2022-09-21 PROCEDURE — 84165 PROTEIN E-PHORESIS SERUM: CPT

## 2022-09-21 PROCEDURE — 84155 ASSAY OF PROTEIN SERUM: CPT

## 2022-09-21 PROCEDURE — 83883 ASSAY NEPHELOMETRY NOT SPEC: CPT

## 2022-09-21 PROCEDURE — 80053 COMPREHEN METABOLIC PANEL: CPT

## 2022-09-21 PROCEDURE — 36591 DRAW BLOOD OFF VENOUS DEVICE: CPT

## 2022-09-21 PROCEDURE — 85025 COMPLETE CBC W/AUTO DIFF WBC: CPT

## 2022-09-21 PROCEDURE — 83615 LACTATE (LD) (LDH) ENZYME: CPT

## 2022-09-21 PROCEDURE — 85652 RBC SED RATE AUTOMATED: CPT

## 2022-09-21 PROCEDURE — 82232 ASSAY OF BETA-2 PROTEIN: CPT

## 2022-09-21 PROCEDURE — 86320 SERUM IMMUNOELECTROPHORESIS: CPT

## 2022-09-21 PROCEDURE — 82784 ASSAY IGA/IGD/IGG/IGM EACH: CPT

## 2022-09-21 RX ORDER — SODIUM CHLORIDE 0.9 % (FLUSH) 0.9 %
20 SYRINGE (ML) INJECTION PRN
OUTPATIENT
Start: 2022-09-21

## 2022-09-21 RX ORDER — HEPARIN SODIUM (PORCINE) LOCK FLUSH IV SOLN 100 UNIT/ML 100 UNIT/ML
500 SOLUTION INTRAVENOUS PRN
Status: DISCONTINUED | OUTPATIENT
Start: 2022-09-21 | End: 2022-09-22 | Stop reason: HOSPADM

## 2022-09-21 RX ORDER — SODIUM CHLORIDE 0.9 % (FLUSH) 0.9 %
20 SYRINGE (ML) INJECTION PRN
Status: DISCONTINUED | OUTPATIENT
Start: 2022-09-21 | End: 2022-09-22 | Stop reason: HOSPADM

## 2022-09-21 RX ORDER — SODIUM CHLORIDE 0.9 % (FLUSH) 0.9 %
10 SYRINGE (ML) INJECTION PRN
OUTPATIENT
Start: 2022-09-21

## 2022-09-21 RX ORDER — HEPARIN SODIUM (PORCINE) LOCK FLUSH IV SOLN 100 UNIT/ML 100 UNIT/ML
500 SOLUTION INTRAVENOUS PRN
OUTPATIENT
Start: 2022-09-21

## 2022-09-21 NOTE — PROGRESS NOTES
Pt reported today for port flush. Has a Mediport on Rightbside chest and was accessed with a 20 G  1 in  Gripper Plus Non-Coring safety needle. Was accessed on 1st attempt, blood return was noted, labs sent to lab for processing. Port was flushed with 20 cc's (0.9% Sodium Chloride Injection USP) and locked with 500 units of heparin. Pt tolerated procedure well. AVS Declined.

## 2022-09-22 LAB
ALBUMIN ELP: 3.5 GM/DL (ref 3.2–5.6)
ALPHA-1-GLOBULIN: 0.1 GM/DL (ref 0.1–0.4)
ALPHA-2-GLOBULIN: 0.7 GM/DL (ref 0.4–1.2)
BETA GLOBULIN: 0.8 GM/DL (ref 0.5–1.3)
GAMMA GLOBULIN: 0.4 GM/DL (ref 0.5–1.6)
SPEP INTERPRETATION: ABNORMAL
SPEP INTERPRETATION: NORMAL
TOTAL PROTEIN: 5.5 GM/DL (ref 6.4–8.2)

## 2022-09-23 LAB — BETA-2 MICROGLOBULIN: 2.8 MG/L

## 2022-09-24 LAB
KAPPA QUANT FREE LIGHT CHAINS: 32.37 MG/L (ref 3.3–19.4)
KAPPA/LAMBDA FREE LIGHT CHAIN RATIO: 2.57 (ref 0.26–1.65)
LAMBDA FREE LIGHT CHAINS QNT: 12.59 MG/L (ref 5.71–26.3)

## 2022-09-25 NOTE — PROGRESS NOTES
Patient Name: Radha Miranda  Patient : 1945  Patient MRN: [id number]     Primary Oncologist: Amber Palmer MD  Referring Provider: RORO Cavazos NP     Date of Service: 2022      Chief Complaint:   Chief Complaint   Patient presents with    Discuss Labs     Patient Active Problem List:     Restless leg syndrome     Hypertension     Diabetes mellitus with neuropathy ([address]     Malignant lymphoplasmacytic lymphoma ([address]     Osteopenia     Gastroesophageal reflux disease without esophagitis     Insomnia     Osteoporosis    HPI:   Ms. Francis Duggan is a 76-year-old very pleasant patient with medical history significant for hypertension, diabetes mellitus, uterine cancer status post hysterectomy in , and chronic kidney disease and discoid lupus erythematosus of eyelid, initially referred to me on 2015, for evaluation of monoclonal gammopathy to rule out plasma cell dyscrasia. Ms. Francis Duggan stated that she was seen by Dr. Andrade Delvalle for proteinuria. Dr. Andrade Delvalle recognized that the patient has elevated gamma globulin on complete metabolic panel and he sent for serum protein electrophoresis and serum free light chain assay. She was found to have 3.17 grams of monoclonal protein on serum protein electrophoresis. Her serum Kappa light chain was more than 700 and Kappa Lambda ratio was more than 70. Because of significant monoclonal protein, she was subsequently referred to me for further evaluation. Laboratory workups done on 2015, showed 2.4 grams of IgM Kappa monoclonal protein on serum protein electrophoresis and immunofixation. LDH was 147, serum Kappa light chain was 121 mg/dL and Kappa Lambda ratio was 327.03. Bone marrow biopsy showed clonal B-cell population detected (26 percent of analyzed cells), CD5 negative, CD10 negative with Kappa light chain restriction. Plasma cells are essentially absent. FISH study was negative.       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 Additional workup showed IgM level of 5362 mg/dL, which is significantly elevated. She also has decreased IgG and IgA level. Ms. Ernst Lock had CT scan of the chest, abdomen, and pelvis on August 11, 2015, and it showed no acute abnormality in the chest, abdomen, or pelvis. She has cholelithiasis, left kidney cyst, and mild bladder wall thickening which is non-specific. Since Ms. Ernst Lock has hyperviscosity state, chemotherapy with fludarabine and Rituximab was started on August 24, 2015 and she has completed her fourth cycle on December 16, 2015. Screening mammogram on 10/29/2021 showed increasing microcalcifications in the right breast some of which could be vascular in etiology. Diagnostic right breast mammogram on 11/11/2021 showed mildly pleomorphic cluster of microcalcifications at the 9 o'clock position of the right breast for which further evaluation with stereotactic biopsy is recommended. 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 ER by IHC - positive (90% strong), PgR by IHC - positive (40% weak). She underwent bilateral mastectomy and right sentinel lymph node biopsy by Dr. Rosemary Salas on 1/3/2022. Pathology showed ductal carcinoma in situ. Microcalcifications are identified. Fibrocystic change is also noted and all the surgical margins are negative for malignancy. Size of DCIS was approximately 1.2 cm and it is grade 2. It has cribriform and comedo necrosis. Genetic counseling and testing showed that she is positive for heterozygous MSH6 mutation. On September 28, 2022, she presented to me for follow up. I have been seen by me for lymphoplasmacytic lymphoma (Waldenstrom macroglobulinemia) and she is status post four cycles of chemotherapy with fludarabine and rituximab.   She has been under close observation since she completed the chemotherapy. She achieved very good response to chemotherapy and her monoclonal protein has been quite stable since then. She does not have any signs or symptoms suggestive of hyperviscosity state and her monoclonal protein was only 100 mg/dL on serum protein electrophoresis done on 9/21/2022. Her IgM level was 244 mg/dL only. She has mild thrombocytopenia. 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 She has normal hemoglobin. Since she is completely asymptomatic and her monoclonal protein has been stable, I recommend to continue with close observation. I will repeat her labs again in 4 months. 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 Since she is status post mastectomy, she doesn't need adjuvant radiation therapy. Since she had bilateral mastectomies for her DCIS, I do not offer adjuvant endocrine therapy or chemoprevention, as the risks/adverse events outweigh any potential benefit for risk reduction. I recommend for observation only. MSH6 mutation (Sampson syndrome) - discussed with her about risks of malignancy. Claudine Worthington has been following her. Hypertension - on losartan/HCTZ. Diabetes - on insulin degludec and novolog. Hyperlipidemia - on zocor    Health maintenance - I recommend age-appropriate cancer screening, exercise, low-fat and low-sodium diet. She doesn't have any significant symptoms at today visit. PAST MEDICAL HISTORY:  Significant for:  1. Hypertension. 2.         Diabetes mellitus. 3.         Uterine cancer. 4.         Discoid lupus. PAST SURGICAL HISTORY:  Significant for:  1. Hysterectomy in 1988. FAMILY HISTORY:  Significant for breast cancer in her mother and liver cancer in her father. No other family history of cancer disease. SOCIAL HISTORY:  She was a former smoker and she quit smoking in 1966.   She edema, no cyanosis,  BREASTS: no palpable mass in bilateral mastectomy sites and axilla      Labs:  Hematology:  Lab Results   Component Value Date    WBC 4.9 09/21/2022    RBC 4.02 (L) 09/21/2022    HGB 13.2 09/21/2022    HCT 38.4 09/21/2022    MCV 95.5 09/21/2022    MCH 32.8 (H) 09/21/2022    MCHC 34.4 09/21/2022    RDW 14.3 09/21/2022     (L) 09/21/2022    MPV 9.2 09/21/2022    BANDSPCT 6 07/23/2015    SEGSPCT 65.0 09/21/2022    EOSRELPCT 1.8 09/21/2022    BASOPCT 0.4 09/21/2022    LYMPHOPCT 25.9 09/21/2022    MONOPCT 6.9 (H) 09/21/2022    BANDABS 0.29 07/23/2015    SEGSABS 3.2 09/21/2022    EOSABS 0.1 09/21/2022    BASOSABS 0.0 09/21/2022    LYMPHSABS 1.3 09/21/2022    MONOSABS 0.3 09/21/2022    DIFFTYPE AUTOMATED DIFFERENTIAL 09/21/2022    WBCMORP OCCASIONAL 07/23/2015     Lab Results   Component Value Date    ESR 4 09/21/2022     Chemistry:  Lab Results   Component Value Date     (L) 09/21/2022    K 3.6 09/21/2022    CL 98 (L) 09/21/2022    CO2 24 09/21/2022    BUN 25 (H) 09/21/2022    CREATININE 0.9 09/21/2022    GLUCOSE 283 (H) 09/21/2022    CALCIUM 8.9 09/21/2022    PROT 5.5 (L) 09/21/2022    PROT 5.5 (L) 09/21/2022    LABALBU 4.0 09/21/2022    BILITOT 0.3 09/21/2022    ALKPHOS 58 09/21/2022    AST 19 09/21/2022    ALT 16 09/21/2022    LABGLOM >60 09/21/2022    GFRAA >60 09/21/2022    AGRATIO 1.9 08/16/2016    GLOB 2.2 08/16/2016    MG 2.2 03/15/2021    POCGLU 171 (H) 03/18/2021     Lab Results   Component Value Date     09/21/2022     No components found for: LD  Lab Results   Component Value Date    TSHHS 1.120 08/31/2015     Immunology:  Lab Results   Component Value Date    PROT 5.5 (L) 09/21/2022    PROT 5.5 (L) 09/21/2022    SPEP  09/21/2022     INTERPRETATION - Monoclonal gammopathy, IgM kappa.   Julieth Duffy MD    SPE  09/21/2022     INTERPRETATION - Monoclonal gammopathy, 100 mg/dL, identified as IgM kappa on concurrent MANJIT, which has decreased from prior measurement of 200 mg/dL on 5/20/22. Mara Avelar MD    ALBUMINELP 3.5 09/21/2022    LABALPH 0.1 09/21/2022    LABALPH 0.7 09/21/2022    LABBETA 0.8 09/21/2022    GAMGLOB 0.4 (L) 09/21/2022     Lab Results   Component Value Date    KAPPAUVOL 32.37 (H) 09/21/2022    LAMBDAUVOL 0.61 12/07/2015    KLFLCR 2.57 (H) 09/21/2022     Lab Results   Component Value Date    B2M 2.8 09/21/2022     Coagulation Panel:  Lab Results   Component Value Date    PROTIME 13.0 03/15/2021    INR 1.07 03/15/2021    APTT 31.8 08/17/2015    DDIMER 711 (H) 08/31/2015     Anemia Panel:  Lab Results   Component Value Date    LYTYJFVR09 642.9 08/31/2015    FOLATE 17.3 08/31/2015     Tumor Markers:  No results found for: , CEA, , LABCA2, PSA  Observations:  PHQ-9 Total Score: 3 (9/28/2022  1:35 PM)  Thoughts that you would be better off dead, or of hurting yourself in some way: 0 (9/28/2022  1:35 PM)        Assessment & Plan:   Lymphoplasmacytic lymphoma (Waldenstrom's macroglobulinemia). PLAN:  Akil Rousseau has been followed for lymphoplasmacytic lymphoma (Waldenstrom macroglobulinemia) and she is status post four cycles of chemotherapy with fludarabine and rituximab. Screening mammogram on 10/29/2021 showed increasing microcalcifications in the right breast some of which could be vascular in etiology. Diagnostic right breast mammogram on 11/11/2021 showed mildly pleomorphic cluster of microcalcifications at the 9 o'clock position of the right breast for which further evaluation with stereotactic biopsy is recommended. 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 ER by IHC - positive (90% strong), PgR by IHC - positive (40% weak). She underwent bilateral mastectomy and right sentinel lymph node biopsy by Dr. Paul Kim on 1/3/2022. Pathology showed ductal carcinoma in situ. Microcalcifications are identified.   Fibrocystic change is also noted and all the surgical margins are negative for malignancy. Size of DCIS was approximately 1.2 cm and it is grade 2. It has cribriform and comedo necrosis. Genetic counseling and testing showed that she is positive for heterozygous MSH6 mutation. On September 28, 2022, she presented to me for follow up. I have been seen by me for lymphoplasmacytic lymphoma (Waldenstrom macroglobulinemia) and she is status post four cycles of chemotherapy with fludarabine and rituximab. She has been under close observation since she completed the chemotherapy. She achieved very good response to chemotherapy and her monoclonal protein has been quite stable since then. She does not have any signs or symptoms suggestive of hyperviscosity state and her monoclonal protein was only 100 mg/dL on serum protein electrophoresis done on 9/21/2022. Her IgM level was 244 mg/dL only. She has mild thrombocytopenia. 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 She has normal hemoglobin. Since she is completely asymptomatic and her monoclonal protein has been stable, I recommend to continue with close observation. I will repeat her labs again in 4 months. 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 Since she is status post mastectomy, she doesn't need adjuvant radiation therapy. Since she had bilateral mastectomies for her DCIS, I do not offer adjuvant endocrine therapy or chemoprevention, as the risks/adverse events outweigh any potential benefit for risk reduction. I recommend for observation only. MSH6 mutation (Sampson syndrome) - discussed with her about risks of malignancy. Ana Maria Chavarria has been following her. Hypertension - on losartan/HCTZ. Diabetes - on insulin degludec and novolog. Hyperlipidemia - on zocor    Health maintenance - I recommend age-appropriate cancer screening, exercise, low-fat and low-sodium diet.     I answered all her questions and concerns for today. Recent imaging and labs were reviewed and discussed with the patient.

## 2022-09-28 ENCOUNTER — HOSPITAL ENCOUNTER (OUTPATIENT)
Dept: INFUSION THERAPY | Age: 77
Discharge: HOME | End: 2022-09-28

## 2022-09-28 ENCOUNTER — OFFICE VISIT (OUTPATIENT)
Dept: ONCOLOGY | Age: 77
End: 2022-09-28
Payer: MEDICARE

## 2022-09-28 VITALS
RESPIRATION RATE: 16 BRPM | SYSTOLIC BLOOD PRESSURE: 135 MMHG | WEIGHT: 146.2 LBS | OXYGEN SATURATION: 97 % | HEIGHT: 62 IN | HEART RATE: 71 BPM | BODY MASS INDEX: 26.91 KG/M2 | TEMPERATURE: 97.5 F | DIASTOLIC BLOOD PRESSURE: 62 MMHG

## 2022-09-28 DIAGNOSIS — D05.11 DUCTAL CARCINOMA IN SITU (DCIS) OF RIGHT BREAST: ICD-10-CM

## 2022-09-28 DIAGNOSIS — C83.00 MALIGNANT LYMPHOPLASMACYTIC LYMPHOMA (HCC): Primary | ICD-10-CM

## 2022-09-28 PROCEDURE — G8417 CALC BMI ABV UP PARAM F/U: HCPCS | Performed by: INTERNAL MEDICINE

## 2022-09-28 PROCEDURE — 1123F ACP DISCUSS/DSCN MKR DOCD: CPT | Performed by: INTERNAL MEDICINE

## 2022-09-28 PROCEDURE — 99213 OFFICE O/P EST LOW 20 MIN: CPT | Performed by: INTERNAL MEDICINE

## 2022-09-28 PROCEDURE — 3017F COLORECTAL CA SCREEN DOC REV: CPT | Performed by: INTERNAL MEDICINE

## 2022-09-28 PROCEDURE — G8427 DOCREV CUR MEDS BY ELIG CLIN: HCPCS | Performed by: INTERNAL MEDICINE

## 2022-09-28 PROCEDURE — G8399 PT W/DXA RESULTS DOCUMENT: HCPCS | Performed by: INTERNAL MEDICINE

## 2022-09-28 PROCEDURE — 1036F TOBACCO NON-USER: CPT | Performed by: INTERNAL MEDICINE

## 2022-09-28 PROCEDURE — 1090F PRES/ABSN URINE INCON ASSESS: CPT | Performed by: INTERNAL MEDICINE

## 2022-09-28 ASSESSMENT — PATIENT HEALTH QUESTIONNAIRE - PHQ9
8. MOVING OR SPEAKING SO SLOWLY THAT OTHER PEOPLE COULD HAVE NOTICED. OR THE OPPOSITE, BEING SO FIGETY OR RESTLESS THAT YOU HAVE BEEN MOVING AROUND A LOT MORE THAN USUAL: 0
SUM OF ALL RESPONSES TO PHQ QUESTIONS 1-9: 3
6. FEELING BAD ABOUT YOURSELF - OR THAT YOU ARE A FAILURE OR HAVE LET YOURSELF OR YOUR FAMILY DOWN: 0
SUM OF ALL RESPONSES TO PHQ QUESTIONS 1-9: 3
SUM OF ALL RESPONSES TO PHQ9 QUESTIONS 1 & 2: 0
SUM OF ALL RESPONSES TO PHQ QUESTIONS 1-9: 3
1. LITTLE INTEREST OR PLEASURE IN DOING THINGS: 0
3. TROUBLE FALLING OR STAYING ASLEEP: 1
2. FEELING DOWN, DEPRESSED OR HOPELESS: 0
4. FEELING TIRED OR HAVING LITTLE ENERGY: 1
10. IF YOU CHECKED OFF ANY PROBLEMS, HOW DIFFICULT HAVE THESE PROBLEMS MADE IT FOR YOU TO DO YOUR WORK, TAKE CARE OF THINGS AT HOME, OR GET ALONG WITH OTHER PEOPLE: 0
7. TROUBLE CONCENTRATING ON THINGS, SUCH AS READING THE NEWSPAPER OR WATCHING TELEVISION: 1
SUM OF ALL RESPONSES TO PHQ QUESTIONS 1-9: 3
9. THOUGHTS THAT YOU WOULD BE BETTER OFF DEAD, OR OF HURTING YOURSELF: 0
5. POOR APPETITE OR OVEREATING: 0

## 2022-11-23 ENCOUNTER — HOSPITAL ENCOUNTER (OUTPATIENT)
Dept: INFUSION THERAPY | Age: 77
Discharge: HOME | End: 2022-11-23
Payer: MEDICARE

## 2022-11-23 DIAGNOSIS — C83.00 MALIGNANT LYMPHOPLASMACYTIC LYMPHOMA (HCC): Primary | ICD-10-CM

## 2022-11-23 PROCEDURE — 96523 IRRIG DRUG DELIVERY DEVICE: CPT

## 2022-11-23 PROCEDURE — 2580000003 HC RX 258: Performed by: INTERNAL MEDICINE

## 2022-11-23 RX ORDER — WATER 1000 ML/1000ML
2.2 INJECTION, SOLUTION INTRAVENOUS ONCE
OUTPATIENT
Start: 2022-11-23 | End: 2022-11-23

## 2022-11-23 RX ORDER — SODIUM CHLORIDE 0.9 % (FLUSH) 0.9 %
10 SYRINGE (ML) INJECTION PRN
OUTPATIENT
Start: 2022-11-23

## 2022-11-23 RX ORDER — SODIUM CHLORIDE 0.9 % (FLUSH) 0.9 %
20 SYRINGE (ML) INJECTION PRN
Status: DISCONTINUED | OUTPATIENT
Start: 2022-11-23 | End: 2022-11-24 | Stop reason: HOSPADM

## 2022-11-23 RX ORDER — HEPARIN SODIUM (PORCINE) LOCK FLUSH IV SOLN 100 UNIT/ML 100 UNIT/ML
500 SOLUTION INTRAVENOUS PRN
OUTPATIENT
Start: 2022-11-23

## 2022-11-23 RX ORDER — SODIUM CHLORIDE 0.9 % (FLUSH) 0.9 %
20 SYRINGE (ML) INJECTION PRN
OUTPATIENT
Start: 2022-11-23

## 2022-11-23 RX ORDER — HEPARIN SODIUM (PORCINE) LOCK FLUSH IV SOLN 100 UNIT/ML 100 UNIT/ML
500 SOLUTION INTRAVENOUS PRN
Status: DISCONTINUED | OUTPATIENT
Start: 2022-11-23 | End: 2022-11-24 | Stop reason: HOSPADM

## 2022-11-23 RX ADMIN — HEPARIN SODIUM (PORCINE) LOCK FLUSH IV SOLN 100 UNIT/ML 500 UNITS: 100 SOLUTION at 15:06

## 2022-11-23 RX ADMIN — SODIUM CHLORIDE, PRESERVATIVE FREE 20 ML: 5 INJECTION INTRAVENOUS at 15:06

## 2022-11-23 NOTE — PROGRESS NOTES
Pt here for port flush. Port accessed with blood return noted. Port flushed and 10 cc blood wasted. Port  then flushed with 20 cc NS and 500 units of heparin. Port then Standard [place]. Pt tolerated with no complaints.  Left ambulatory discharge instructions given

## 2023-01-13 DIAGNOSIS — C83.00 MALIGNANT LYMPHOPLASMACYTIC LYMPHOMA (HCC): Primary | ICD-10-CM

## 2023-01-20 ENCOUNTER — HOSPITAL ENCOUNTER (OUTPATIENT)
Dept: INFUSION THERAPY | Age: 78
Discharge: HOME | End: 2023-01-20
Payer: MEDICARE

## 2023-01-20 DIAGNOSIS — D05.11 DUCTAL CARCINOMA IN SITU (DCIS) OF RIGHT BREAST: ICD-10-CM

## 2023-01-20 DIAGNOSIS — C83.00 MALIGNANT LYMPHOPLASMACYTIC LYMPHOMA (HCC): Primary | ICD-10-CM

## 2023-01-20 LAB
ALBUMIN SERPL-MCNC: 4.2 GM/DL (ref 3.4–5)
ALP BLD-CCNC: 46 IU/L (ref 40–128)
ALT SERPL-CCNC: 15 U/L (ref 10–40)
ANION GAP SERPL CALCULATED.3IONS-SCNC: 10 MMOL/L (ref 4–16)
AST SERPL-CCNC: 19 IU/L (ref 15–37)
BASOPHILS ABSOLUTE: 0 K/CU MM
BASOPHILS RELATIVE PERCENT: 0.2 % (ref 0–1)
BILIRUB SERPL-MCNC: 0.5 MG/DL (ref 0–1)
BUN BLDV-MCNC: 22 MG/DL (ref 6–23)
CALCIUM SERPL-MCNC: 9.6 MG/DL (ref 8.3–10.6)
CHLORIDE BLD-SCNC: 99 MMOL/L (ref 99–110)
CO2: 27 MMOL/L (ref 21–32)
CREAT SERPL-MCNC: 0.8 MG/DL (ref 0.6–1.1)
DIFFERENTIAL TYPE: ABNORMAL
EOSINOPHILS ABSOLUTE: 0.1 K/CU MM
EOSINOPHILS RELATIVE PERCENT: 1.8 % (ref 0–3)
GFR SERPL CREATININE-BSD FRML MDRD: >60 ML/MIN/1.73M2
GLUCOSE BLD-MCNC: 131 MG/DL (ref 70–99)
HCT VFR BLD CALC: 40.2 % (ref 37–47)
HEMOGLOBIN: 13.6 GM/DL (ref 12.5–16)
IGA: 257 MG/DL (ref 69–382)
IGG,SERUM: 300 MG/DL (ref 723–1685)
IGM,SERUM: 238 MG/DL (ref 62–277)
LACTATE DEHYDROGENASE: 199 IU/L (ref 120–246)
LYMPHOCYTES ABSOLUTE: 1.6 K/CU MM
LYMPHOCYTES RELATIVE PERCENT: 31 % (ref 24–44)
MCH RBC QN AUTO: 32.2 PG (ref 27–31)
MCHC RBC AUTO-ENTMCNC: 33.8 % (ref 32–36)
MCV RBC AUTO: 95.3 FL (ref 78–100)
MONOCYTES ABSOLUTE: 0.4 K/CU MM
MONOCYTES RELATIVE PERCENT: 8 % (ref 0–4)
PDW BLD-RTO: 14.4 % (ref 11.7–14.9)
PLATELET # BLD: 135 K/CU MM (ref 140–440)
PMV BLD AUTO: 9.2 FL (ref 7.5–11.1)
POTASSIUM SERPL-SCNC: 3.7 MMOL/L (ref 3.5–5.1)
RBC # BLD: 4.22 M/CU MM (ref 4.2–5.4)
SEGMENTED NEUTROPHILS ABSOLUTE COUNT: 3 K/CU MM
SEGMENTED NEUTROPHILS RELATIVE PERCENT: 59 % (ref 36–66)
SODIUM BLD-SCNC: 136 MMOL/L (ref 135–145)
TOTAL PROTEIN: 5.9 GM/DL (ref 6.4–8.2)
TOTAL PROTEIN: 5.9 GM/DL (ref 6.4–8.2)
WBC # BLD: 5.1 K/CU MM (ref 4–10.5)

## 2023-01-20 PROCEDURE — 86320 SERUM IMMUNOELECTROPHORESIS: CPT

## 2023-01-20 PROCEDURE — 6360000002 HC RX W HCPCS: Performed by: INTERNAL MEDICINE

## 2023-01-20 PROCEDURE — 80053 COMPREHEN METABOLIC PANEL: CPT

## 2023-01-20 PROCEDURE — 85025 COMPLETE CBC W/AUTO DIFF WBC: CPT

## 2023-01-20 PROCEDURE — 82232 ASSAY OF BETA-2 PROTEIN: CPT

## 2023-01-20 PROCEDURE — 83615 LACTATE (LD) (LDH) ENZYME: CPT

## 2023-01-20 PROCEDURE — 82784 ASSAY IGA/IGD/IGG/IGM EACH: CPT

## 2023-01-20 PROCEDURE — 2580000003 HC RX 258: Performed by: INTERNAL MEDICINE

## 2023-01-20 PROCEDURE — 84165 PROTEIN E-PHORESIS SERUM: CPT

## 2023-01-20 PROCEDURE — 83883 ASSAY NEPHELOMETRY NOT SPEC: CPT

## 2023-01-20 PROCEDURE — 85652 RBC SED RATE AUTOMATED: CPT

## 2023-01-20 PROCEDURE — 84155 ASSAY OF PROTEIN SERUM: CPT

## 2023-01-20 PROCEDURE — 36591 DRAW BLOOD OFF VENOUS DEVICE: CPT

## 2023-01-20 RX ORDER — SODIUM CHLORIDE 0.9 % (FLUSH) 0.9 %
20 SYRINGE (ML) INJECTION PRN
Status: DISCONTINUED | OUTPATIENT
Start: 2023-01-20 | End: 2023-01-21 | Stop reason: HOSPADM

## 2023-01-20 RX ORDER — WATER 1000 ML/1000ML
2.2 INJECTION, SOLUTION INTRAVENOUS ONCE
OUTPATIENT
Start: 2023-01-20 | End: 2023-01-20

## 2023-01-20 RX ORDER — HEPARIN SODIUM (PORCINE) LOCK FLUSH IV SOLN 100 UNIT/ML 100 UNIT/ML
500 SOLUTION INTRAVENOUS PRN
Status: DISCONTINUED | OUTPATIENT
Start: 2023-01-20 | End: 2023-01-21 | Stop reason: HOSPADM

## 2023-01-20 RX ORDER — SODIUM CHLORIDE 0.9 % (FLUSH) 0.9 %
20 SYRINGE (ML) INJECTION PRN
OUTPATIENT
Start: 2023-01-20

## 2023-01-20 RX ORDER — HEPARIN SODIUM (PORCINE) LOCK FLUSH IV SOLN 100 UNIT/ML 100 UNIT/ML
500 SOLUTION INTRAVENOUS PRN
OUTPATIENT
Start: 2023-01-20

## 2023-01-20 RX ORDER — SODIUM CHLORIDE 0.9 % (FLUSH) 0.9 %
10 SYRINGE (ML) INJECTION PRN
OUTPATIENT
Start: 2023-01-20

## 2023-01-20 RX ADMIN — HEPARIN 500 UNITS: 100 SYRINGE at 14:30

## 2023-01-20 RX ADMIN — SODIUM CHLORIDE, PRESERVATIVE FREE 20 ML: 5 INJECTION INTRAVENOUS at 14:30

## 2023-01-20 NOTE — PROGRESS NOTES
Patient arrived to treatment suite for port draw. Right chest mediport accessed and flushed with normal saline, good blood return noted. Labs drawn. Flushed and heparin locked, band-aid applied. Patient tolerated well. Left treatment suite ambulatory. Discharge instructions provided.  RTC 01/27 for OV with Dr. Louise Nagy.

## 2023-01-21 NOTE — PROGRESS NOTES
Patient Name: Guillermo Huerta  Patient : 1945  Patient MRN: [id number]     Primary Oncologist: Juliane Howell MD  Referring Provider: RORO Fajardo NP     Date of Service: 2023      Chief Complaint:   Chief Complaint   Patient presents with    Follow-up     Patient Active Problem List:     Restless leg syndrome     Hypertension     Diabetes mellitus with neuropathy ([address]     Malignant lymphoplasmacytic lymphoma ([address]     Osteopenia     Gastroesophageal reflux disease without esophagitis     Insomnia     Osteoporosis    HPI:   Ms. Jaxson Geiger is a 81-year-old very pleasant patient with medical history significant for hypertension, diabetes mellitus, uterine cancer status post hysterectomy in , and chronic kidney disease and discoid lupus erythematosus of eyelid, initially referred to me on 2015, for evaluation of monoclonal gammopathy to rule out plasma cell dyscrasia. Ms. Jaxson Geiger stated that she was seen by Dr. Viri Jordan for proteinuria. Dr. Viri Jordan recognized that the patient has elevated gamma globulin on complete metabolic panel and he sent for serum protein electrophoresis and serum free light chain assay. She was found to have 3.17 grams of monoclonal protein on serum protein electrophoresis. Her serum Kappa light chain was more than 700 and Kappa Lambda ratio was more than 70. Because of significant monoclonal protein, she was subsequently referred to me for further evaluation. Laboratory workups done on 2015, showed 2.4 grams of IgM Kappa monoclonal protein on serum protein electrophoresis and immunofixation. LDH was 147, serum Kappa light chain was 121 mg/dL and Kappa Lambda ratio was 327.03. Bone marrow biopsy showed clonal B-cell population detected (26 percent of analyzed cells), CD5 negative, CD10 negative with Kappa light chain restriction. Plasma cells are essentially absent. FISH study was negative.       Second opinion from the bone marrow Pathologist at the [facility] confirmed that Ms. 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 Additional workup showed IgM level of 5362 mg/dL, which is significantly elevated. She also has decreased IgG and IgA level. Ms. Sean Yepez had CT scan of the chest, abdomen, and pelvis on August 11, 2015, and it showed no acute abnormality in the chest, abdomen, or pelvis. She has cholelithiasis, left kidney cyst, and mild bladder wall thickening which is non-specific. Since Ms. Sean Yepez has hyperviscosity state, chemotherapy with fludarabine and Rituximab was started on August 24, 2015 and she has completed her fourth cycle on December 16, 2015. Screening mammogram on 10/29/2021 showed increasing microcalcifications in the right breast some of which could be vascular in etiology. Diagnostic right breast mammogram on 11/11/2021 showed mildly pleomorphic cluster of microcalcifications at the 9 o'clock position of the right breast for which further evaluation with stereotactic biopsy is recommended. 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 ER by IHC - positive (90% strong), PgR by IHC - positive (40% weak). She underwent bilateral mastectomy and right sentinel lymph node biopsy by Dr. Eric Agrawal on 1/3/2022. Pathology showed ductal carcinoma in situ. Microcalcifications are identified. Fibrocystic change is also noted and all the surgical margins are negative for malignancy. Size of DCIS was approximately 1.2 cm and it is grade 2. It has cribriform and comedo necrosis. Genetic counseling and testing showed that she is positive for heterozygous MSH6 mutation. On January 28, 2023, she presented to me for follow up. I have been seen by me for lymphoplasmacytic lymphoma (Waldenstrom macroglobulinemia) and she is status post four cycles of chemotherapy with fludarabine and rituximab.   She has been under close observation since she completed the chemotherapy. She achieved very good response to chemotherapy and her monoclonal protein has been quite stable since then. She does not have any signs or symptoms suggestive of hyperviscosity state and her monoclonal protein was only 100 mg/dL on serum protein electrophoresis done on 1/20/2023. Her IgM level was 238 mg/dL only. She has mild thrombocytopenia. 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 She has normal hemoglobin and neutrophil count. Since she is completely asymptomatic and her monoclonal protein has been stable, I recommend to continue with close observation. I will repeat her labs again in 4 months. 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 Since she is status post mastectomy, she doesn't need adjuvant radiation therapy. Since she had bilateral mastectomies for her DCIS, I do not offer adjuvant endocrine therapy or chemoprevention, as the risks/adverse events outweigh any potential benefit for risk reduction. I recommend for observation only. MSH6 mutation (Sampson syndrome) - discussed with her about risks of malignancy. Michell Ahuja has been following her. I will request CT abdomen/pelvis in 4 months, to r/o recurrent lymphoma, hepatosplenomegaly and Sampson syndrome related malignancy. Hypertension - on losartan/HCTZ. Diabetes - on insulin degludec and novolog. Hyperlipidemia - on zocor    Health maintenance - I recommend age-appropriate cancer screening, exercise, low-fat and low-sodium diet. She doesn't have any significant symptoms at today visit. PAST MEDICAL HISTORY:  Significant for:  1. Hypertension. 2.         Diabetes mellitus. 3.         Uterine cancer. 4.         Discoid lupus. PAST SURGICAL HISTORY:  Significant for:  1. Hysterectomy in 1988.     FAMILY HISTORY:  Significant for breast cancer in her mother and liver cancer in her father. No other family history of cancer disease. SOCIAL HISTORY:  She was a former smoker and she quit smoking in 1966. She denies alcohol drinking and illicit drug abuse. ALLERGIES:  She claims to have allergies to latex. No known drug allergies. Oncology History    No history exists. Review of Systems: \"Per interval history; otherwise 10 point ROS is negative. \"  Her energy level is pretty good and her sleep is fine. She doesn't have fever, chills, night sweats, cough, shortness of breath, chest pain, hemoptysis or palpitations. Her bowel and bladder functions are normal. She denies nausea, vomiting, abdominal pain, diarrhea, constipation, dysuria, loss of appetit or weight loss. She denies neuropathy and she doesn't have bleeding or clotting issues. She denies any pain in her body. No anxiety or depression. The rest of the systems are unremarkable.      Vital Signs:  /68 (Site: Left Upper Arm, Position: Sitting, Cuff Size: Medium Adult)   Pulse 68   Temp 97.5 °F (36.4 °C) (Temporal)   Resp 16   Ht 5' 2\" (1.575 m)   Wt 147 lb 3.2 oz (66.8 kg)   SpO2 98%   BMI 26.92 kg/m²     Physical Exam:  CONSTITUTIONAL: awake, alert, cooperative, no apparent distress   EYES: pupils equal, round and reactive to light, sclera clear and conjunctiva normal  ENT: Normocephalic, without obvious abnormality, atraumatic  NECK: supple, symmetrical, no jugular venous distension and no carotid bruits   HEMATOLOGIC/LYMPHATIC: no cervical, supraclavicular or axillary lymphadenopathy   LUNGS: VBS, no wheezes, clear to auscultation, no rhonchi, no increased work of breathing, no crackles,   CARDIOVASCULAR: regular rate and rhythm, normal S1 and S2, no murmur noted  ABDOMEN: normal bowel sounds x 4, soft, non-distended, non-tender, no masses palpated, no hepatosplenomegaly   MUSCULOSKELETAL: full range of motion noted, tone is normal  NEUROLOGIC: awake, alert, oriented to name, place and time. Motor skills grossly intact. SKIN: Normal skin color, texture, turgor and no jaundice.  appears intact   EXTREMITIES: no clubbing, no LE edema, no leg swelling, no cyanosis,  BREASTS: no palpable mass in bilateral mastectomy sites and axilla      Labs:  Hematology:  Lab Results   Component Value Date    WBC 5.1 01/20/2023    RBC 4.22 01/20/2023    HGB 13.6 01/20/2023    HCT 40.2 01/20/2023    MCV 95.3 01/20/2023    MCH 32.2 (H) 01/20/2023    MCHC 33.8 01/20/2023    RDW 14.4 01/20/2023     (L) 01/20/2023    MPV 9.2 01/20/2023    BANDSPCT 6 07/23/2015    SEGSPCT 59.0 01/20/2023    EOSRELPCT 1.8 01/20/2023    BASOPCT 0.2 01/20/2023    LYMPHOPCT 31.0 01/20/2023    MONOPCT 8.0 (H) 01/20/2023    BANDABS 0.29 07/23/2015    SEGSABS 3.0 01/20/2023    EOSABS 0.1 01/20/2023    BASOSABS 0.0 01/20/2023    LYMPHSABS 1.6 01/20/2023    MONOSABS 0.4 01/20/2023    DIFFTYPE AUTOMATED DIFFERENTIAL 01/20/2023    WBCMORP OCCASIONAL 07/23/2015     Lab Results   Component Value Date    ESR 4 09/21/2022     Chemistry:  Lab Results   Component Value Date     01/20/2023    K 3.7 01/20/2023    CL 99 01/20/2023    CO2 27 01/20/2023    BUN 22 01/20/2023    CREATININE 0.8 01/20/2023    GLUCOSE 131 (H) 01/20/2023    CALCIUM 9.6 01/20/2023    PROT 5.9 (L) 01/20/2023    PROT 5.9 (L) 01/20/2023    LABALBU 4.2 01/20/2023    BILITOT 0.5 01/20/2023    ALKPHOS 46 01/20/2023    AST 19 01/20/2023    ALT 15 01/20/2023    LABGLOM >60 01/20/2023    GFRAA >60 09/21/2022    AGRATIO 1.9 08/16/2016    GLOB 2.2 08/16/2016    MG 2.2 03/15/2021    POCGLU 171 (H) 03/18/2021     Lab Results   Component Value Date     01/20/2023     No components found for: LD  Lab Results   Component Value Date    TSHHS 1.120 08/31/2015     Immunology:  Lab Results   Component Value Date    PROT 5.9 (L) 01/20/2023    PROT 5.9 (L) 01/20/2023    SPEP  01/20/2023     INTERPRETATION - Monoclonal gammopathy, 100 mg/dL, identified as IgM kappa on concurrent MANJIT, which has remained stable since 9/21/22. Mechelle Raza MD    SPEP  01/20/2023     INTERPRETATION - Monoclonal gammopathy, IgM kappa. Mechelle Raza MD    ALBUMINELP 3.6 01/20/2023    LABALPH 0.2 01/20/2023    LABALPH 0.7 01/20/2023    LABBETA 0.9 01/20/2023    GAMGLOB 0.5 01/20/2023     Lab Results   Component Value Date    KAPPAUVOL 35.61 (H) 01/20/2023    LAMBDAUVOL 0.61 12/07/2015    KLFLCR 2.47 (H) 01/20/2023     Lab Results   Component Value Date    B2M 2.7 01/20/2023     Coagulation Panel:  Lab Results   Component Value Date    PROTIME 13.0 03/15/2021    INR 1.07 03/15/2021    APTT 31.8 08/17/2015    DDIMER 711 (H) 08/31/2015     Anemia Panel:  Lab Results   Component Value Date    CAABHIAG37 642.9 08/31/2015    FOLATE 17.3 08/31/2015     Tumor Markers:  No results found for: , CEA, , LABCA2, PSA  Observations:  No data recorded     Assessment:   Lymphoplasmacytic lymphoma (Waldenstrom's macroglobulinemia). Plan:  Raiza Becerril has been followed for lymphoplasmacytic lymphoma (Waldenstrom macroglobulinemia) and she is status post four cycles of chemotherapy with fludarabine and rituximab. Screening mammogram on 10/29/2021 showed increasing microcalcifications in the right breast some of which could be vascular in etiology. Diagnostic right breast mammogram on 11/11/2021 showed mildly pleomorphic cluster of microcalcifications at the 9 o'clock position of the right breast for which further evaluation with stereotactic biopsy is recommended. 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 ER by IHC - positive (90% strong), PgR by IHC - positive (40% weak). She underwent bilateral mastectomy and right sentinel lymph node biopsy by Dr. Veronica Trimble on 1/3/2022. Pathology showed ductal carcinoma in situ. Microcalcifications are identified.   Fibrocystic change is also noted and all the surgical margins are negative for malignancy. Size of DCIS was approximately 1.2 cm and it is grade 2. It has cribriform and comedo necrosis. Genetic counseling and testing showed that she is positive for heterozygous MSH6 mutation. On January 28, 2023, she presented to me for follow up. I have been seen by me for lymphoplasmacytic lymphoma (Waldenstrom macroglobulinemia) and she is status post four cycles of chemotherapy with fludarabine and rituximab. She has been under close observation since she completed the chemotherapy. She achieved very good response to chemotherapy and her monoclonal protein has been quite stable since then. She does not have any signs or symptoms suggestive of hyperviscosity state and her monoclonal protein was only 100 mg/dL on serum protein electrophoresis done on 1/20/2023. Her IgM level was 238 mg/dL only. She has mild thrombocytopenia. 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 She has normal hemoglobin and neutrophil count. Since she is completely asymptomatic and her monoclonal protein has been stable, I recommend to continue with close observation. I will repeat her labs again in 4 months. 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 Since she is status post mastectomy, she doesn't need adjuvant radiation therapy. Since she had bilateral mastectomies for her DCIS, I do not offer adjuvant endocrine therapy or chemoprevention, as the risks/adverse events outweigh any potential benefit for risk reduction. I recommend for observation only. MSH6 mutation (Sampson syndrome) - discussed with her about risks of malignancy. Lupe Martinez has been following her. I will request CT abdomen/pelvis in 4 months, to r/o recurrent lymphoma, hepatosplenomegaly and Sampson syndrome related malignancy. Hypertension - on losartan/HCTZ. Diabetes - on insulin degludec and novolog. Hyperlipidemia - on zocor    Health maintenance - I recommend age-appropriate cancer screening, exercise, low-fat and low-sodium diet. I answered all her questions and concerns for today. Recent imaging and labs were reviewed and discussed with the patient.

## 2023-01-23 LAB
ALBUMIN ELP: 3.6 GM/DL (ref 3.2–5.6)
ALPHA-1-GLOBULIN: 0.2 GM/DL (ref 0.1–0.4)
ALPHA-2-GLOBULIN: 0.7 GM/DL (ref 0.4–1.2)
BETA GLOBULIN: 0.9 GM/DL (ref 0.5–1.3)
BETA-2 MICROGLOBULIN: 2.7 MG/L
GAMMA GLOBULIN: 0.5 GM/DL (ref 0.5–1.6)
KAPPA QUANT FREE LIGHT CHAINS: 35.61 MG/L (ref 3.3–19.4)
KAPPA/LAMBDA FREE LIGHT CHAIN RATIO: 2.47 (ref 0.26–1.65)
LAMBDA FREE LIGHT CHAINS QNT: 14.4 MG/L (ref 5.71–26.3)
SPEP INTERPRETATION: ABNORMAL
SPEP INTERPRETATION: NORMAL
TOTAL PROTEIN: 5.9 GM/DL (ref 6.4–8.2)

## 2023-01-27 ENCOUNTER — OFFICE VISIT (OUTPATIENT)
Dept: ONCOLOGY | Age: 78
End: 2023-01-27
Payer: MEDICARE

## 2023-01-27 ENCOUNTER — HOSPITAL ENCOUNTER (OUTPATIENT)
Dept: INFUSION THERAPY | Age: 78
Discharge: HOME | End: 2023-01-27
Payer: MEDICARE

## 2023-01-27 VITALS
HEART RATE: 68 BPM | RESPIRATION RATE: 16 BRPM | HEIGHT: 62 IN | TEMPERATURE: 97.5 F | DIASTOLIC BLOOD PRESSURE: 68 MMHG | OXYGEN SATURATION: 98 % | BODY MASS INDEX: 27.09 KG/M2 | SYSTOLIC BLOOD PRESSURE: 127 MMHG | WEIGHT: 147.2 LBS

## 2023-01-27 DIAGNOSIS — D05.11 DUCTAL CARCINOMA IN SITU (DCIS) OF RIGHT BREAST: ICD-10-CM

## 2023-01-27 DIAGNOSIS — C83.00 MALIGNANT LYMPHOPLASMACYTIC LYMPHOMA (HCC): Primary | ICD-10-CM

## 2023-01-27 PROCEDURE — G8399 PT W/DXA RESULTS DOCUMENT: HCPCS | Performed by: INTERNAL MEDICINE

## 2023-01-27 PROCEDURE — 3074F SYST BP LT 130 MM HG: CPT | Performed by: INTERNAL MEDICINE

## 2023-01-27 PROCEDURE — 99211 OFF/OP EST MAY X REQ PHY/QHP: CPT

## 2023-01-27 PROCEDURE — G8417 CALC BMI ABV UP PARAM F/U: HCPCS | Performed by: INTERNAL MEDICINE

## 2023-01-27 PROCEDURE — 99214 OFFICE O/P EST MOD 30 MIN: CPT | Performed by: INTERNAL MEDICINE

## 2023-01-27 PROCEDURE — G8427 DOCREV CUR MEDS BY ELIG CLIN: HCPCS | Performed by: INTERNAL MEDICINE

## 2023-01-27 PROCEDURE — 1090F PRES/ABSN URINE INCON ASSESS: CPT | Performed by: INTERNAL MEDICINE

## 2023-01-27 PROCEDURE — 1123F ACP DISCUSS/DSCN MKR DOCD: CPT | Performed by: INTERNAL MEDICINE

## 2023-01-27 PROCEDURE — 1036F TOBACCO NON-USER: CPT | Performed by: INTERNAL MEDICINE

## 2023-01-27 PROCEDURE — G8484 FLU IMMUNIZE NO ADMIN: HCPCS | Performed by: INTERNAL MEDICINE

## 2023-01-27 PROCEDURE — 3078F DIAST BP <80 MM HG: CPT | Performed by: INTERNAL MEDICINE

## 2023-01-27 NOTE — PROGRESS NOTES
MA Rooming Questions  Patient: Martha Morris  MRN: [id number]    Date: 1/27/2023        1. Do you have any new issues? yes - having issues w/ Gallbladder         2. Do you need any refills on medications?    no    3. Have you had any imaging done since your last visit?   no    4. Have you been hospitalized or seen in the emergency room since your last visit here?   no    5. Did the patient have a depression screening completed today?  No    No data recorded     PHQ-9 Given to (if applicable):               PHQ-9 Score (if applicable):                     [] Positive     []  Negative              Does question #9 need addressed (if applicable)                     [] Yes    []  No               Jordan Crane CMA

## 2023-05-23 ENCOUNTER — HOSPITAL ENCOUNTER (OUTPATIENT)
Dept: INFUSION THERAPY | Age: 78
Discharge: HOME | End: 2023-05-23
Payer: MEDICARE

## 2023-05-23 DIAGNOSIS — D05.11 DUCTAL CARCINOMA IN SITU (DCIS) OF RIGHT BREAST: ICD-10-CM

## 2023-05-23 DIAGNOSIS — C83.00 MALIGNANT LYMPHOPLASMACYTIC LYMPHOMA (HCC): Primary | ICD-10-CM

## 2023-05-23 LAB
ALBUMIN SERPL-MCNC: 4.4 GM/DL (ref 3.4–5)
ALP BLD-CCNC: 40 IU/L (ref 40–129)
ALT SERPL-CCNC: 16 U/L (ref 10–40)
ANION GAP SERPL CALCULATED.3IONS-SCNC: 9 MMOL/L (ref 4–16)
AST SERPL-CCNC: 21 IU/L (ref 15–37)
BASOPHILS ABSOLUTE: 0 K/CU MM
BASOPHILS RELATIVE PERCENT: 0.4 % (ref 0–1)
BILIRUB SERPL-MCNC: 0.5 MG/DL (ref 0–1)
BUN SERPL-MCNC: 28 MG/DL (ref 6–23)
CALCIUM SERPL-MCNC: 9.3 MG/DL (ref 8.3–10.6)
CHLORIDE BLD-SCNC: 105 MMOL/L (ref 99–110)
CO2: 26 MMOL/L (ref 21–32)
CREAT SERPL-MCNC: 0.8 MG/DL (ref 0.6–1.1)
DIFFERENTIAL TYPE: ABNORMAL
EOSINOPHILS ABSOLUTE: 0.1 K/CU MM
EOSINOPHILS RELATIVE PERCENT: 2.2 % (ref 0–3)
ERYTHROCYTE SEDIMENTATION RATE: 2 MM/HR (ref 0–30)
GFR SERPL CREATININE-BSD FRML MDRD: >60 ML/MIN/1.73M2
GLUCOSE SERPL-MCNC: 59 MG/DL (ref 70–99)
HCT VFR BLD CALC: 42 % (ref 37–47)
HEMOGLOBIN: 14.3 GM/DL (ref 12.5–16)
IGA: 287 MG/DL (ref 69–382)
IGG,SERUM: <300 MG/DL (ref 723–1685)
IGM,SERUM: 227 MG/DL (ref 62–277)
LACTATE DEHYDROGENASE: 211 IU/L (ref 120–246)
LYMPHOCYTES ABSOLUTE: 1.3 K/CU MM
LYMPHOCYTES RELATIVE PERCENT: 26.2 % (ref 24–44)
MCH RBC QN AUTO: 32.8 PG (ref 27–31)
MCHC RBC AUTO-ENTMCNC: 34 % (ref 32–36)
MCV RBC AUTO: 96.3 FL (ref 78–100)
MONOCYTES ABSOLUTE: 0.4 K/CU MM
MONOCYTES RELATIVE PERCENT: 8.2 % (ref 0–4)
PDW BLD-RTO: 14.5 % (ref 11.7–14.9)
PLATELET # BLD: 127 K/CU MM (ref 140–440)
PMV BLD AUTO: 9.2 FL (ref 7.5–11.1)
POTASSIUM SERPL-SCNC: 3.9 MMOL/L (ref 3.5–5.1)
RBC # BLD: 4.36 M/CU MM (ref 4.2–5.4)
SEGMENTED NEUTROPHILS ABSOLUTE COUNT: 3.1 K/CU MM
SEGMENTED NEUTROPHILS RELATIVE PERCENT: 63 % (ref 36–66)
SODIUM BLD-SCNC: 140 MMOL/L (ref 135–145)
TOTAL PROTEIN: 6.2 GM/DL (ref 6.4–8.2)
WBC # BLD: 4.9 K/CU MM (ref 4–10.5)

## 2023-05-23 PROCEDURE — 84155 ASSAY OF PROTEIN SERUM: CPT

## 2023-05-23 PROCEDURE — 85652 RBC SED RATE AUTOMATED: CPT

## 2023-05-23 PROCEDURE — 83883 ASSAY NEPHELOMETRY NOT SPEC: CPT

## 2023-05-23 PROCEDURE — 6360000002 HC RX W HCPCS: Performed by: INTERNAL MEDICINE

## 2023-05-23 PROCEDURE — 85025 COMPLETE CBC W/AUTO DIFF WBC: CPT

## 2023-05-23 PROCEDURE — 36591 DRAW BLOOD OFF VENOUS DEVICE: CPT

## 2023-05-23 PROCEDURE — 2580000003 HC RX 258: Performed by: INTERNAL MEDICINE

## 2023-05-23 PROCEDURE — 80053 COMPREHEN METABOLIC PANEL: CPT

## 2023-05-23 PROCEDURE — 86320 SERUM IMMUNOELECTROPHORESIS: CPT

## 2023-05-23 PROCEDURE — 84165 PROTEIN E-PHORESIS SERUM: CPT

## 2023-05-23 PROCEDURE — 82232 ASSAY OF BETA-2 PROTEIN: CPT

## 2023-05-23 PROCEDURE — 82784 ASSAY IGA/IGD/IGG/IGM EACH: CPT

## 2023-05-23 PROCEDURE — 83615 LACTATE (LD) (LDH) ENZYME: CPT

## 2023-05-23 RX ORDER — SODIUM CHLORIDE 0.9 % (FLUSH) 0.9 %
10 SYRINGE (ML) INJECTION PRN
Status: DISCONTINUED | OUTPATIENT
Start: 2023-05-23 | End: 2023-05-24 | Stop reason: HOSPADM

## 2023-05-23 RX ORDER — HEPARIN SODIUM (PORCINE) LOCK FLUSH IV SOLN 100 UNIT/ML 100 UNIT/ML
500 SOLUTION INTRAVENOUS PRN
Status: DISCONTINUED | OUTPATIENT
Start: 2023-05-23 | End: 2023-05-24 | Stop reason: HOSPADM

## 2023-05-23 RX ORDER — SODIUM CHLORIDE 0.9 % (FLUSH) 0.9 %
20 SYRINGE (ML) INJECTION PRN
Status: DISCONTINUED | OUTPATIENT
Start: 2023-05-23 | End: 2023-05-24 | Stop reason: HOSPADM

## 2023-05-23 RX ADMIN — SODIUM CHLORIDE, PRESERVATIVE FREE 10 ML: 5 INJECTION INTRAVENOUS at 10:41

## 2023-05-23 RX ADMIN — HEPARIN 500 UNITS: 100 SYRINGE at 10:43

## 2023-05-23 RX ADMIN — SODIUM CHLORIDE, PRESERVATIVE FREE 20 ML: 5 INJECTION INTRAVENOUS at 10:42

## 2023-05-23 NOTE — PROGRESS NOTES
Pt. Here for blood work following office visit. Right upper chest mediport accessed without difficulty, blood return noted after repositioning pt. Forward. Lab work drawn as ordered. Mediport flushed per protocol, port de-accessed and 2x2 and bandaide applied to site.

## 2023-05-24 LAB
B2 MICROGLOB SERPL-MCNC: 2.7 MG/L
KAPPA LC FREE SER-MCNC: 33.53 MG/L (ref 3.3–19.4)
KAPPA LC FREE/LAMBDA FREE SER NEPH: 2.99 {RATIO} (ref 0.26–1.65)
LAMBDA LC FREE SERPL-MCNC: 11.21 MG/L (ref 5.71–26.3)

## 2023-05-25 ENCOUNTER — TELEPHONE (OUTPATIENT)
Dept: ONCOLOGY | Age: 78
End: 2023-05-25

## 2023-05-26 LAB
ALBUMIN SERPL ELPH-MCNC: 3.9 GM/DL (ref 3.2–5.6)
ALPHA-1-GLOBULIN: 0.2 GM/DL (ref 0.1–0.4)
ALPHA-2-GLOBULIN: 0.7 GM/DL (ref 0.4–1.2)
BETA GLOBULIN: 1 GM/DL (ref 0.5–1.3)
GAMMA GLOBULIN: 0.5 GM/DL (ref 0.5–1.6)
TOTAL PROTEIN: 6.2 GM/DL (ref 6.4–8.2)

## 2023-05-26 NOTE — TELEPHONE ENCOUNTER
Contacted pt @ [phone] to address Mediport concern, pt states last time port was accessed blood return was not immediate, pt was repositioned and port functioned normally w/ successful blood return, reassured pt occasionally this can happen and is remedied by repositioning and declotting protocols, since blood return was reestablished Mediport is expected to function properly, if issue persists port dye study could possibly be indicated in the future    Pt has CT AP scheduled for 06/07/23 w/ 0930 arrival and would like to utilize Mediport, placed on schedule for 0845 @ [facility] for port access, pt to return s/p imaging to de-access and flush, pt verbalized understanding, denied further questions/concerns, encouraged to call office w/ future needs

## 2023-05-29 LAB
ALBUMIN SERPL ELPH-MCNC: 3.9 GM/DL (ref 3.2–5.6)
ALPHA-1-GLOBULIN: 0.2 GM/DL (ref 0.1–0.4)
ALPHA-2-GLOBULIN: 0.7 GM/DL (ref 0.4–1.2)
BETA GLOBULIN: 1 GM/DL (ref 0.5–1.3)
GAMMA GLOBULIN: 0.5 GM/DL (ref 0.5–1.6)
SPEP INTERPRETATION: ABNORMAL
SPEP INTERPRETATION: NORMAL
TOTAL PROTEIN: 6.2 GM/DL (ref 6.4–8.2)

## 2023-06-07 ENCOUNTER — HOSPITAL ENCOUNTER (OUTPATIENT)
Dept: CT IMAGING | Age: 78
Discharge: HOME | End: 2023-06-07
Payer: MEDICARE

## 2023-06-07 ENCOUNTER — HOSPITAL ENCOUNTER (OUTPATIENT)
Dept: INFUSION THERAPY | Age: 78
Discharge: HOME | End: 2023-06-07
Payer: MEDICARE

## 2023-06-07 DIAGNOSIS — C83.00 MALIGNANT LYMPHOPLASMACYTIC LYMPHOMA (HCC): Primary | ICD-10-CM

## 2023-06-07 DIAGNOSIS — C83.00 MALIGNANT LYMPHOPLASMACYTIC LYMPHOMA (HCC): ICD-10-CM

## 2023-06-07 PROCEDURE — 2580000003 HC RX 258: Performed by: INTERNAL MEDICINE

## 2023-06-07 PROCEDURE — 6360000002 HC RX W HCPCS: Performed by: INTERNAL MEDICINE

## 2023-06-07 PROCEDURE — 96523 IRRIG DRUG DELIVERY DEVICE: CPT

## 2023-06-07 PROCEDURE — 6360000004 HC RX CONTRAST MEDICATION: Performed by: INTERNAL MEDICINE

## 2023-06-07 PROCEDURE — 74177 CT ABD & PELVIS W/CONTRAST: CPT

## 2023-06-07 PROCEDURE — 2500000003 HC RX 250 WO HCPCS: Performed by: INTERNAL MEDICINE

## 2023-06-07 RX ORDER — SODIUM CHLORIDE 0.9 % (FLUSH) 0.9 %
10 SYRINGE (ML) INJECTION PRN
Status: DISCONTINUED | OUTPATIENT
Start: 2023-06-07 | End: 2023-06-08 | Stop reason: HOSPADM

## 2023-06-07 RX ORDER — HEPARIN SODIUM (PORCINE) LOCK FLUSH IV SOLN 100 UNIT/ML 100 UNIT/ML
500 SOLUTION INTRAVENOUS PRN
Status: DISCONTINUED | OUTPATIENT
Start: 2023-06-07 | End: 2023-06-08 | Stop reason: HOSPADM

## 2023-06-07 RX ORDER — SODIUM CHLORIDE 0.9 % (FLUSH) 0.9 %
20 SYRINGE (ML) INJECTION PRN
Status: DISCONTINUED | OUTPATIENT
Start: 2023-06-07 | End: 2023-06-08 | Stop reason: HOSPADM

## 2023-06-07 RX ADMIN — IOPAMIDOL 75 ML: 755 INJECTION, SOLUTION INTRAVENOUS at 10:49

## 2023-06-07 RX ADMIN — SODIUM CHLORIDE, PRESERVATIVE FREE 20 ML: 5 INJECTION INTRAVENOUS at 09:06

## 2023-06-07 RX ADMIN — HEPARIN 500 UNITS: 100 SYRINGE at 11:12

## 2023-06-07 RX ADMIN — SODIUM CHLORIDE, PRESERVATIVE FREE 10 ML: 5 INJECTION INTRAVENOUS at 09:05

## 2023-06-07 RX ADMIN — BARIUM SULFATE 900 ML: 20 SUSPENSION ORAL at 11:05

## 2023-06-07 NOTE — PROGRESS NOTES
Pt. Here for port access prior to CT scan. Right upper chest mediport accessed with power port needle, good blood return noted. Pt. To return after scan. 11:15 Pt. Returned after scan to have port de-accessed. Mediport flushed per protocol and de-accessed, 2x2 and bandaide applied to site.

## 2023-06-15 ENCOUNTER — HOSPITAL ENCOUNTER (OUTPATIENT)
Dept: INFUSION THERAPY | Age: 78
Discharge: HOME | End: 2023-06-15
Payer: MEDICARE

## 2023-06-15 PROCEDURE — 99211 OFF/OP EST MAY X REQ PHY/QHP: CPT

## 2023-06-23 LAB
BUN / CREAT RATIO: 25 (ref 12–28)
BUN BLDV-MCNC: 22 MG/DL (ref 8–27)
CALCIUM SERPL-MCNC: 9.9 MG/DL (ref 8.7–10.3)
CHLORIDE BLD-SCNC: 100 MMOL/L (ref 96–106)
CHOLESTEROL, TOTAL: 139 MG/DL (ref 100–199)
CO2: 24 MMOL/L (ref 20–29)
CREAT SERPL-MCNC: 0.89 MG/DL (ref 0.57–1)
ESTIMATED GLOMERULAR FILTRATION RATE CREATININE EQUATION: 67 ML/MIN/1.73
GLUCOSE BLD-MCNC: 211 MG/DL (ref 70–99)
HBA1C MFR BLD: 6.3 % (ref 4.8–5.6)
HDLC SERPL-MCNC: 41 MG/DL
LDL CHOLESTEROL CALCULATED: 68 MG/DL (ref 0–99)
POTASSIUM SERPL-SCNC: 4.5 MMOL/L (ref 3.5–5.2)
SODIUM BLD-SCNC: 139 MMOL/L (ref 134–144)
TRIGL SERPL-MCNC: 178 MG/DL (ref 0–149)
TSH SERPL DL<=0.05 MIU/L-ACNC: 0.81 UIU/ML (ref 0.45–4.5)
VITAMIN D 25-HYDROXY: 64.9 NG/ML (ref 30–100)
VLDLC SERPL CALC-MCNC: 30 MG/DL (ref 5–40)

## 2023-06-30 ENCOUNTER — HOSPITAL ENCOUNTER (OUTPATIENT)
Dept: MRI IMAGING | Age: 78
Discharge: HOME | End: 2023-06-30
Attending: INTERNAL MEDICINE
Payer: MEDICARE

## 2023-06-30 ENCOUNTER — HOSPITAL ENCOUNTER (OUTPATIENT)
Dept: INFUSION THERAPY | Age: 78
Discharge: HOME | End: 2023-06-30
Payer: MEDICARE

## 2023-06-30 DIAGNOSIS — K86.9 PANCREATIC LESION: ICD-10-CM

## 2023-06-30 DIAGNOSIS — C83.00 MALIGNANT LYMPHOPLASMACYTIC LYMPHOMA (HCC): Primary | ICD-10-CM

## 2023-06-30 DIAGNOSIS — C83.00 MALIGNANT LYMPHOPLASMACYTIC LYMPHOMA (HCC): ICD-10-CM

## 2023-06-30 PROCEDURE — A9577 INJ MULTIHANCE: HCPCS | Performed by: INTERNAL MEDICINE

## 2023-06-30 PROCEDURE — 2580000003 HC RX 258: Performed by: INTERNAL MEDICINE

## 2023-06-30 PROCEDURE — 96523 IRRIG DRUG DELIVERY DEVICE: CPT

## 2023-06-30 PROCEDURE — 74183 MRI ABD W/O CNTR FLWD CNTR: CPT

## 2023-06-30 PROCEDURE — 6360000004 HC RX CONTRAST MEDICATION: Performed by: INTERNAL MEDICINE

## 2023-06-30 PROCEDURE — 6360000002 HC RX W HCPCS: Performed by: INTERNAL MEDICINE

## 2023-06-30 RX ORDER — SODIUM CHLORIDE 0.9 % (FLUSH) 0.9 %
10 SYRINGE (ML) INJECTION PRN
Status: DISCONTINUED | OUTPATIENT
Start: 2023-06-30 | End: 2023-07-01 | Stop reason: HOSPADM

## 2023-06-30 RX ORDER — HEPARIN SODIUM 100 [USP'U]/ML
500 INJECTION, SOLUTION INTRAVENOUS PRN
Status: DISCONTINUED | OUTPATIENT
Start: 2023-06-30 | End: 2023-07-01 | Stop reason: HOSPADM

## 2023-06-30 RX ADMIN — HEPARIN 500 UNITS: 100 SYRINGE at 12:45

## 2023-06-30 RX ADMIN — GADOBENATE DIMEGLUMINE 12 ML: 529 INJECTION, SOLUTION INTRAVENOUS at 11:32

## 2023-06-30 RX ADMIN — SODIUM CHLORIDE, PRESERVATIVE FREE 10 ML: 5 INJECTION INTRAVENOUS at 12:45

## 2023-07-09 PROBLEM — K86.9 PANCREATIC LESION: Status: ACTIVE | Noted: 2023-07-09

## 2023-07-20 ENCOUNTER — OFFICE VISIT (OUTPATIENT)
Dept: ONCOLOGY | Age: 78
End: 2023-07-20
Payer: MEDICARE

## 2023-07-20 ENCOUNTER — HOSPITAL ENCOUNTER (OUTPATIENT)
Dept: INFUSION THERAPY | Age: 78
Discharge: HOME | End: 2023-07-20
Payer: MEDICARE

## 2023-07-20 VITALS
DIASTOLIC BLOOD PRESSURE: 60 MMHG | HEIGHT: 62 IN | TEMPERATURE: 97.7 F | SYSTOLIC BLOOD PRESSURE: 130 MMHG | WEIGHT: 149.2 LBS | HEART RATE: 67 BPM | BODY MASS INDEX: 27.46 KG/M2 | OXYGEN SATURATION: 98 %

## 2023-07-20 DIAGNOSIS — K86.9 PANCREATIC LESION: ICD-10-CM

## 2023-07-20 DIAGNOSIS — C83.00 MALIGNANT LYMPHOPLASMACYTIC LYMPHOMA (HCC): ICD-10-CM

## 2023-07-20 DIAGNOSIS — D05.11 DUCTAL CARCINOMA IN SITU (DCIS) OF RIGHT BREAST: Primary | ICD-10-CM

## 2023-07-20 PROCEDURE — 99213 OFFICE O/P EST LOW 20 MIN: CPT | Performed by: INTERNAL MEDICINE

## 2023-07-20 PROCEDURE — 99211 OFF/OP EST MAY X REQ PHY/QHP: CPT

## 2023-07-20 PROCEDURE — 1123F ACP DISCUSS/DSCN MKR DOCD: CPT | Performed by: INTERNAL MEDICINE

## 2023-07-20 PROCEDURE — 3078F DIAST BP <80 MM HG: CPT | Performed by: INTERNAL MEDICINE

## 2023-07-20 PROCEDURE — 3075F SYST BP GE 130 - 139MM HG: CPT | Performed by: INTERNAL MEDICINE

## 2023-07-20 ASSESSMENT — PATIENT HEALTH QUESTIONNAIRE - PHQ9
SUM OF ALL RESPONSES TO PHQ QUESTIONS 1-9: 0
SUM OF ALL RESPONSES TO PHQ9 QUESTIONS 1 & 2: 0
2. FEELING DOWN, DEPRESSED OR HOPELESS: 0
1. LITTLE INTEREST OR PLEASURE IN DOING THINGS: 0
SUM OF ALL RESPONSES TO PHQ QUESTIONS 1-9: 0

## 2023-07-20 NOTE — PROGRESS NOTES
MA Rooming Questions  Patient: Saba Issa  MRN: [id number]    Date: 7/20/2023        1. Do you have any new issues?   no         2. Do you need any refills on medications?    no    3. Have you had any imaging done since your last visit? yes - mri 6/30 labs 6/22    4. Have you been hospitalized or seen in the emergency room since your last visit here?   no    5. Did the patient have a depression screening completed today?  Yes    PHQ-9 Total Score: 0 (7/20/2023  2:13 PM)       PHQ-9 Given to (if applicable):               PHQ-9 Score (if applicable):                     [] Positive     []  Negative              Does question #9 need addressed (if applicable)                     [] Yes    []  No               Ban Angel CMA

## 2023-08-12 LAB
CHOLESTEROL, TOTAL: 165 MG/DL (ref 100–199)
HDLC SERPL-MCNC: 41 MG/DL
LDL CHOLESTEROL CALCULATED: 95 MG/DL (ref 0–99)
TRIGL SERPL-MCNC: 168 MG/DL (ref 0–149)
VLDLC SERPL CALC-MCNC: 29 MG/DL (ref 5–40)

## 2023-09-28 ENCOUNTER — HOSPITAL ENCOUNTER (OUTPATIENT)
Dept: INFUSION THERAPY | Age: 78
Discharge: HOME | End: 2023-09-28
Payer: MEDICARE

## 2023-09-28 DIAGNOSIS — C83.00 MALIGNANT LYMPHOPLASMACYTIC LYMPHOMA (HCC): Primary | ICD-10-CM

## 2023-09-28 PROCEDURE — 2580000003 HC RX 258: Performed by: INTERNAL MEDICINE

## 2023-09-28 PROCEDURE — 96523 IRRIG DRUG DELIVERY DEVICE: CPT

## 2023-09-28 PROCEDURE — 6360000002 HC RX W HCPCS: Performed by: INTERNAL MEDICINE

## 2023-09-28 RX ORDER — WATER 1000 ML/1000ML
2.2 INJECTION, SOLUTION INTRAVENOUS ONCE
OUTPATIENT
Start: 2023-09-28 | End: 2023-09-28

## 2023-09-28 RX ORDER — SODIUM CHLORIDE 0.9 % (FLUSH) 0.9 %
20 SYRINGE (ML) INJECTION PRN
Status: DISCONTINUED | OUTPATIENT
Start: 2023-09-28 | End: 2023-09-29 | Stop reason: HOSPADM

## 2023-09-28 RX ORDER — SODIUM CHLORIDE 0.9 % (FLUSH) 0.9 %
10 SYRINGE (ML) INJECTION PRN
Status: CANCELLED | OUTPATIENT
Start: 2023-09-28

## 2023-09-28 RX ORDER — SODIUM CHLORIDE 0.9 % (FLUSH) 0.9 %
10 SYRINGE (ML) INJECTION PRN
OUTPATIENT
Start: 2023-09-28

## 2023-09-28 RX ORDER — WATER 1000 ML/1000ML
2.2 INJECTION, SOLUTION INTRAVENOUS ONCE
Status: CANCELLED | OUTPATIENT
Start: 2023-09-28 | End: 2023-09-28

## 2023-09-28 RX ORDER — SODIUM CHLORIDE 0.9 % (FLUSH) 0.9 %
20 SYRINGE (ML) INJECTION PRN
OUTPATIENT
Start: 2023-09-28

## 2023-09-28 RX ORDER — HEPARIN 100 UNIT/ML
500 SYRINGE INTRAVENOUS PRN
OUTPATIENT
Start: 2023-09-28

## 2023-09-28 RX ORDER — HEPARIN 100 UNIT/ML
500 SYRINGE INTRAVENOUS PRN
Status: DISCONTINUED | OUTPATIENT
Start: 2023-09-28 | End: 2023-09-29 | Stop reason: HOSPADM

## 2023-09-28 RX ADMIN — HEPARIN 500 UNITS: 100 SYRINGE at 14:50

## 2023-09-28 RX ADMIN — SODIUM CHLORIDE, PRESERVATIVE FREE 20 ML: 5 INJECTION INTRAVENOUS at 14:50

## 2023-09-28 NOTE — PROGRESS NOTES
Ambulated to infusion area. Here for port flush. Mediport accessed. Negative blood return. Flushed per protocol. Tolerated well. No concerns voiced. Discharged in stable condition.

## 2023-10-25 LAB
BUN / CREAT RATIO: 18 (ref 12–28)
BUN BLDV-MCNC: 15 MG/DL (ref 8–27)
CALCIUM SERPL-MCNC: 9.7 MG/DL (ref 8.7–10.3)
CHLORIDE BLD-SCNC: 97 MMOL/L (ref 96–106)
CO2: 25 MMOL/L (ref 20–29)
CREAT SERPL-MCNC: 0.84 MG/DL (ref 0.57–1)
CREATININE URINE: 48.4 MG/DL
ESTIMATED GLOMERULAR FILTRATION RATE CREATININE EQUATION: 72 ML/MIN/1.73
FERRITIN: 129 NG/ML (ref 15–150)
GLUCOSE BLD-MCNC: 176 MG/DL (ref 70–99)
HCT VFR BLD CALC: 40 % (ref 34–46.6)
HEMOGLOBIN: 13.8 G/DL (ref 11.1–15.9)
IRON SATURATION: 23 % (ref 15–55)
IRON: 71 UG/DL (ref 27–139)
MAGNESIUM: 2 MG/DL (ref 1.6–2.3)
MCH RBC QN AUTO: 33.4 PG (ref 26.6–33)
MCHC RBC AUTO-ENTMCNC: 34.5 G/DL (ref 31.5–35.7)
MCV RBC AUTO: 97 FL (ref 79–97)
MICROALBUMIN UR-MCNC: 17.5 UG/ML
MICROALBUMIN/CREAT UR-RTO: 36 MG/G CREAT (ref 0–29)
PDW BLD-RTO: 13.2 % (ref 11.7–15.4)
PLATELET # BLD: 138 X10E3/UL (ref 150–450)
POTASSIUM SERPL-SCNC: 4.8 MMOL/L (ref 3.5–5.2)
RBC # BLD: 4.13 X10E6/UL (ref 3.77–5.28)
SODIUM BLD-SCNC: 135 MMOL/L (ref 134–144)
TOTAL IRON BINDING CAPACITY: 312 UG/DL (ref 250–450)
UNSATURATED IRON BINDING CAPACITY: 241 UG/DL (ref 118–369)
WBC # BLD: 4.7 X10E3/UL (ref 3.4–10.8)

## 2023-11-13 ENCOUNTER — HOSPITAL ENCOUNTER (OUTPATIENT)
Dept: INFUSION THERAPY | Age: 78
Discharge: HOME | End: 2023-11-13
Payer: MEDICARE

## 2023-11-13 DIAGNOSIS — D05.11 DUCTAL CARCINOMA IN SITU (DCIS) OF RIGHT BREAST: Primary | ICD-10-CM

## 2023-11-13 DIAGNOSIS — K86.9 PANCREATIC LESION: ICD-10-CM

## 2023-11-13 DIAGNOSIS — C83.00 MALIGNANT LYMPHOPLASMACYTIC LYMPHOMA (HCC): ICD-10-CM

## 2023-11-13 LAB
ALBUMIN SERPL-MCNC: 4.2 GM/DL (ref 3.4–5)
ALP BLD-CCNC: 47 IU/L (ref 40–129)
ALT SERPL-CCNC: 15 U/L (ref 10–40)
ANION GAP SERPL CALCULATED.3IONS-SCNC: 15 MMOL/L (ref 4–16)
AST SERPL-CCNC: 18 IU/L (ref 15–37)
BASOPHILS ABSOLUTE: 0 K/CU MM
BASOPHILS RELATIVE PERCENT: 0.5 % (ref 0–1)
BILIRUB SERPL-MCNC: 0.5 MG/DL (ref 0–1)
BUN SERPL-MCNC: 19 MG/DL (ref 6–23)
CALCIUM SERPL-MCNC: 9.3 MG/DL (ref 8.3–10.6)
CHLORIDE BLD-SCNC: 94 MMOL/L (ref 99–110)
CO2: 24 MMOL/L (ref 21–32)
CREAT SERPL-MCNC: 0.8 MG/DL (ref 0.6–1.1)
DIFFERENTIAL TYPE: ABNORMAL
EOSINOPHILS ABSOLUTE: 0.1 K/CU MM
EOSINOPHILS RELATIVE PERCENT: 2 % (ref 0–3)
ERYTHROCYTE SEDIMENTATION RATE: <1 MM/HR (ref 0–30)
GFR SERPL CREATININE-BSD FRML MDRD: >60 ML/MIN/1.73M2
GLUCOSE SERPL-MCNC: 168 MG/DL (ref 70–99)
HCT VFR BLD CALC: 36 % (ref 37–47)
HEMOGLOBIN: 12.6 GM/DL (ref 12.5–16)
IGA: 237 MG/DL (ref 69–382)
IGG,SERUM: 311 MG/DL (ref 723–1685)
IGM,SERUM: 207 MG/DL (ref 62–277)
LACTATE DEHYDROGENASE: 189 IU/L (ref 120–246)
LYMPHOCYTES ABSOLUTE: 1.2 K/CU MM
LYMPHOCYTES RELATIVE PERCENT: 30.4 % (ref 24–44)
MCH RBC QN AUTO: 33.6 PG (ref 27–31)
MCHC RBC AUTO-ENTMCNC: 35 % (ref 32–36)
MCV RBC AUTO: 96 FL (ref 78–100)
MONOCYTES ABSOLUTE: 0.4 K/CU MM
MONOCYTES RELATIVE PERCENT: 9.2 % (ref 0–4)
PDW BLD-RTO: 14.1 % (ref 11.7–14.9)
PLATELET # BLD: 144 K/CU MM (ref 140–440)
PMV BLD AUTO: 8.6 FL (ref 7.5–11.1)
POTASSIUM SERPL-SCNC: 3.7 MMOL/L (ref 3.5–5.1)
RBC # BLD: 3.75 M/CU MM (ref 4.2–5.4)
SEGMENTED NEUTROPHILS ABSOLUTE COUNT: 2.3 K/CU MM
SEGMENTED NEUTROPHILS RELATIVE PERCENT: 57.9 % (ref 36–66)
SODIUM BLD-SCNC: 133 MMOL/L (ref 135–145)
TOTAL PROTEIN: 6 GM/DL (ref 6.4–8.2)
WBC # BLD: 4 K/CU MM (ref 4–10.5)

## 2023-11-13 PROCEDURE — 86320 SERUM IMMUNOELECTROPHORESIS: CPT

## 2023-11-13 PROCEDURE — 83615 LACTATE (LD) (LDH) ENZYME: CPT

## 2023-11-13 PROCEDURE — 85025 COMPLETE CBC W/AUTO DIFF WBC: CPT

## 2023-11-13 PROCEDURE — 80053 COMPREHEN METABOLIC PANEL: CPT

## 2023-11-13 PROCEDURE — 85652 RBC SED RATE AUTOMATED: CPT

## 2023-11-13 PROCEDURE — 36591 DRAW BLOOD OFF VENOUS DEVICE: CPT

## 2023-11-13 PROCEDURE — 84155 ASSAY OF PROTEIN SERUM: CPT

## 2023-11-13 PROCEDURE — 83883 ASSAY NEPHELOMETRY NOT SPEC: CPT

## 2023-11-13 PROCEDURE — 6360000002 HC RX W HCPCS: Performed by: INTERNAL MEDICINE

## 2023-11-13 PROCEDURE — 84165 PROTEIN E-PHORESIS SERUM: CPT

## 2023-11-13 PROCEDURE — 82232 ASSAY OF BETA-2 PROTEIN: CPT

## 2023-11-13 PROCEDURE — 82784 ASSAY IGA/IGD/IGG/IGM EACH: CPT

## 2023-11-13 RX ORDER — WATER 10 ML/10ML
2.2 INJECTION INTRAMUSCULAR; INTRAVENOUS; SUBCUTANEOUS ONCE
OUTPATIENT
Start: 2023-11-13 | End: 2023-11-13

## 2023-11-13 RX ORDER — SODIUM CHLORIDE 0.9 % (FLUSH) 0.9 %
20 SYRINGE (ML) INJECTION PRN
OUTPATIENT
Start: 2023-11-13

## 2023-11-13 RX ORDER — SODIUM CHLORIDE 0.9 % (FLUSH) 0.9 %
10 SYRINGE (ML) INJECTION PRN
OUTPATIENT
Start: 2023-11-13

## 2023-11-13 RX ORDER — HEPARIN 100 UNIT/ML
500 SYRINGE INTRAVENOUS PRN
Status: DISCONTINUED | OUTPATIENT
Start: 2023-11-13 | End: 2023-11-14 | Stop reason: HOSPADM

## 2023-11-13 RX ORDER — SODIUM CHLORIDE 0.9 % (FLUSH) 0.9 %
10 SYRINGE (ML) INJECTION PRN
Status: DISCONTINUED | OUTPATIENT
Start: 2023-11-13 | End: 2023-11-14 | Stop reason: HOSPADM

## 2023-11-13 RX ORDER — HEPARIN 100 UNIT/ML
500 SYRINGE INTRAVENOUS PRN
OUTPATIENT
Start: 2023-11-13

## 2023-11-13 NOTE — PROGRESS NOTES
Patient arrived to treatment suite as add-on for port draw. Right chest mediport accessed and blood drawn from site and sent to lab for processing. Port flushed with normal saline and heparinized. De-accessed and band-aid applied. Patient tolerated well.

## 2023-11-15 LAB
B2 MICROGLOB SERPL-MCNC: 2.4 MG/L
KAPPA LC FREE SER-MCNC: 33.92 MG/L (ref 3.3–19.4)
KAPPA LC FREE/LAMBDA FREE SER NEPH: 2.66 {RATIO} (ref 0.26–1.65)
LAMBDA LC FREE SERPL-MCNC: 12.76 MG/L (ref 5.71–26.3)

## 2023-11-17 LAB
ALBUMIN SERPL ELPH-MCNC: 3.7 GM/DL (ref 3.2–5.6)
ALPHA-1-GLOBULIN: 0.2 GM/DL (ref 0.1–0.4)
ALPHA-2-GLOBULIN: 0.7 GM/DL (ref 0.4–1.2)
BETA GLOBULIN: 1 GM/DL (ref 0.5–1.3)
GAMMA GLOBULIN: 0.4 GM/DL (ref 0.5–1.6)
SPEP INTERPRETATION: ABNORMAL
SPEP INTERPRETATION: NORMAL
TOTAL PROTEIN: 6 GM/DL (ref 6.4–8.2)

## 2023-11-20 ENCOUNTER — HOSPITAL ENCOUNTER (OUTPATIENT)
Dept: INFUSION THERAPY | Age: 78
Discharge: HOME | End: 2023-11-20
Payer: MEDICARE

## 2023-11-20 ENCOUNTER — OFFICE VISIT (OUTPATIENT)
Dept: ONCOLOGY | Age: 78
End: 2023-11-20
Payer: MEDICARE

## 2023-11-20 VITALS
TEMPERATURE: 97.5 F | WEIGHT: 155.8 LBS | HEIGHT: 62 IN | BODY MASS INDEX: 28.67 KG/M2 | HEART RATE: 90 BPM | DIASTOLIC BLOOD PRESSURE: 88 MMHG | SYSTOLIC BLOOD PRESSURE: 152 MMHG | OXYGEN SATURATION: 95 %

## 2023-11-20 DIAGNOSIS — C83.00 MALIGNANT LYMPHOPLASMACYTIC LYMPHOMA (HCC): ICD-10-CM

## 2023-11-20 DIAGNOSIS — D05.11 DUCTAL CARCINOMA IN SITU (DCIS) OF RIGHT BREAST: Primary | ICD-10-CM

## 2023-11-20 PROCEDURE — 3079F DIAST BP 80-89 MM HG: CPT | Performed by: INTERNAL MEDICINE

## 2023-11-20 PROCEDURE — 1123F ACP DISCUSS/DSCN MKR DOCD: CPT | Performed by: INTERNAL MEDICINE

## 2023-11-20 PROCEDURE — 3077F SYST BP >= 140 MM HG: CPT | Performed by: INTERNAL MEDICINE

## 2023-11-20 PROCEDURE — 99211 OFF/OP EST MAY X REQ PHY/QHP: CPT

## 2023-11-20 PROCEDURE — 99213 OFFICE O/P EST LOW 20 MIN: CPT | Performed by: INTERNAL MEDICINE

## 2023-11-20 NOTE — PROGRESS NOTES
MA Rooming Questions  Patient: Cathi Peralta  MRN: [id number]    Date: 11/20/2023        1. Do you have any new issues?   no         2. Do you need any refills on medications?    no    3. Have you had any imaging done since your last visit?   no    4. Have you been hospitalized or seen in the emergency room since your last visit here?   no    5. Did the patient have a depression screening completed today?  No    No data recorded     PHQ-9 Given to (if applicable):               PHQ-9 Score (if applicable):                     [] Positive     []  Negative              Does question #9 need addressed (if applicable)                     [] Yes    []  No               Joseph Hernandez MA
status post hysterectomy with surgical clips in the right adnexa. 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 These can be followed up with MRI/MRCP in 6-12 months. Biliary duct dilation without obvious filling defect within the limitations of the exam due to motion artifact. Findings likely reflect changes related to prior cholecystectomy and can be correlated with LFTs. Hepatomegaly with hepatic steatosis. On November 20, 2023, she presented to me for follow up. I have been seen by me for lymphoplasmacytic lymphoma (Waldenstrom macroglobulinemia) and she is status post four cycles of chemotherapy with fludarabine and rituximab. She has been under close observation since she completed the chemotherapy. She achieved very good response to chemotherapy and her monoclonal protein has been quite stable since then. She does not have any signs or symptoms suggestive of hyperviscosity state and her monoclonal protein was only 200 mg/dL on serum protein electrophoresis done on 11/13/2023. Her IgM level was 207 mg/dL only. She has normal platlet count this time. I believe her mild thrombocytopenia before was due to ITP. She has normal hemoglobin and neutrophil count. Since she is completely asymptomatic and her monoclonal protein has been stable, I recommend to continue with close observation. I will repeat her labs again in 4 months. 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 Since she is status post mastectomy, she doesn't need adjuvant radiation therapy. Since she had bilateral mastectomies for her DCIS, I do not offer adjuvant endocrine therapy or chemoprevention, as the risks/adverse events outweigh any potential benefit for risk reduction. I recommend for observation only. MSH6 mutation (Sampson syndrome) - discussed with her about risks of malignancy.  Nasreen Trevizo has been

## 2023-12-13 ENCOUNTER — HOSPITAL ENCOUNTER (OUTPATIENT)
Dept: PHYSICAL THERAPY | Age: 78
Discharge: HOME | End: 2023-12-13
Payer: MEDICARE

## 2023-12-13 PROCEDURE — 97161 PT EVAL LOW COMPLEX 20 MIN: CPT

## 2023-12-13 PROCEDURE — 97110 THERAPEUTIC EXERCISES: CPT

## 2023-12-13 ASSESSMENT — PAIN DESCRIPTION - PAIN TYPE: TYPE: CHRONIC PAIN

## 2023-12-13 ASSESSMENT — PAIN DESCRIPTION - DESCRIPTORS: DESCRIPTORS: ACHING;SHOOTING;SORE

## 2023-12-13 ASSESSMENT — PAIN DESCRIPTION - LOCATION: LOCATION: BACK

## 2023-12-13 ASSESSMENT — PAIN SCALES - GENERAL: PAINLEVEL_OUTOF10: 4

## 2023-12-13 NOTE — FLOWSHEET NOTE
Outpatient Physical Therapy  [place]           [x] Phone: [phone]   Fax: [phone]  [place]           [] Phone: [phone]   Fax: [phone]        Physical Therapy Daily Treatment Note  Date:  2023    Patient Name:  Zoila Gloria    :  1945  MRN: [id number]  Restrictions/Precautions: NONE  Diagnosis:   No admission diagnoses are documented for this encounter. Diagnosis: low back pain/abn gait  Date of Injury/Surgery: --  Treatment Diagnosis:  Decreased activity tolerance limtied by LBP  Insurance/Certification information: [facility] Medicare  Referring Physician:  MD Tino Huang-NP   PCP: RORO Araiza NP  Next Doctor Visit:  --  Plan of care signed (Y/N):  N, sent 23  Outcome Measure: Oswestry:   Visit# / total visits:     Pain level: 1/10   Goals:     Patient goals: improve walking and pain in her back  Short term goals  Time Frame for Short term goals: 4 weeks  Pt demo I w/ HEP and symptom management  Pt demo ability to walk >800 ft in 6 minutes without rest breaks or increased pain  Pt demo >4/5 BLE strength in all directions without increased pain  Pt demo x10 STS with good TA activation and PPT and no UE assist  Pt demo Oswestry score <30% disability to improve tolerance to ADL's    Summary of Evaluation:  Assessment: Patient is a 70 y/o female is experiencing new onset of low back pain that has worsened over the last year. Patient describes as an ache that is worse with walking, initiation of movement, and standing for long periods of time. She does mya a cane for longer distances. Significant PMH including lumbar laminectomy on 3/16/21 involving L3-4 by Dr. Jose Fabian, diabetic neuropathy, malignant lymphoplasmacytic lymphoma , and Uterine cancer in . Pt would benefit from PT intervention to address interventions listed above to improve return to PLOF.         Subjective:  See eval         Any changes in Ambulatory Summary

## 2023-12-13 NOTE — PLAN OF CARE
Outpatient Physical Therapy           [place]           [] Phone: [phone]   Fax: [phone]  [facility]           [] Phone: [phone]   Fax: [phone]     To: MD Alyssa Garcia APRN-NP   From: Adela Romo PT, PT     Patient: Lorenza Berry       : 1945  Diagnosis: No admission diagnoses are documented for this encounter. Diagnosis: low back pain/abn gait  Treatment Diagnosis: Decreased activity tolerance limtied by LBP  Date: 2023    Physical Therapy Certification/Re-Certification Form  Dear Dr. Nery Bains  The following patient has been evaluated for physical therapy services and for therapy to continue, insurance requires physician review of the treatment plan initially and every 90 days. Please review the attached evaluation and/or summary of the patient's plan of care, and verify that you agree therapy should continue by signing the attached document and sending it back to our office. Assessment:    Assessment: Patient is a 68 y/o female is experiencing new onset of low back pain that has worsened over the last year. Patient describes as an ache that is worse with walking, initiation of movement, and standing for long periods of time. She does mya a cane for longer distances. Significant PMH including lumbar laminectomy on 3/16/21 involving L3-4 by Dr. Dieter Duñeas, diabetic neuropathy, malignant lymphoplasmacytic lymphoma , and Uterine cancer in . Pt would benefit from PT intervention to address interventions listed above to improve return to PLOF.     Plan of Care/Treatment to date:  [x] Therapeutic Exercise  [] Modalities:  [x] Therapeutic Activity     [] Ultrasound  [] Electrical Stimulation  [] Gait Training      [] Cervical Traction [] Lumbar Traction  [x] Neuromuscular Re-education    [] Cold/hotpack [] Iontophoresis   [x] Instruction in HEP      [] Vasopneumatic    [] Dry Needling  [x] Manual Therapy               [] Aquatic Therapy       Other:

## 2023-12-13 NOTE — PROGRESS NOTES
Physical Therapy: Initial Evaluation    Patient: Joe Dueñas (36 y.o. female)   Examination Date:   Plan of Care Certification Period: 2023 to        :  1945 ;    Confirmed: Yes MRN: [id number]  CSN: [id number]   Insurance: Payor: Madison Atkinson / Plan: [place] / Product Type: *No Product type* /   Insurance ID: [id number] - (Medicare Managed) Secondary Insurance (if applicable):    Referring Physician: MD Sudarshan Weinberg   PCP: RORO Saldana NP Visits to Date/Visits Approved:   /      No Show/Cancelled Appts:   /       Medical Diagnosis: No admission diagnoses are documented for this encounter.  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    Dr Hao Gomez; Jerelene Churchman macroglobulinemia    RLS (restless legs syndrome)     Uterine cancer ([address])     AHMET, Radium implant; no recurrence     Surgical History:   Past Surgical History:   Procedure Laterality Date    CHOLECYSTECTOMY      HAND SURGERY  2016    HYSTERECTOMY (CERVIX STATUS UNKNOWN)      LUMBAR LAMINECTOMY N/A 2021    MINIMALLY INVASIVE LEFT L3-4 ANTERIOR TO PSOAS DISECTOMY AND ARTHODESIS TITANIUM INTERBODY DEVICE , MORSELIZD SYNTHETIC ALLOGRAFT POSTERIOR L3-4 INTSTRUMENTATION AND FUSION , INTROPERATIVE FLUROSCOPY AND NEURO MONITORING performed by Judit Willoughby MD at [facility] LOC BREAST BIOPSY RIGHT Right 2021    [facility] LOC BREAST BIOPSY RIGHT 2021 Andra Montemayor MD [address]         Medications:   Current

## 2023-12-15 ENCOUNTER — HOSPITAL ENCOUNTER (OUTPATIENT)
Dept: PHYSICAL THERAPY | Age: 78
Discharge: HOME | End: 2023-12-15
Payer: MEDICARE

## 2023-12-15 PROCEDURE — 97110 THERAPEUTIC EXERCISES: CPT

## 2023-12-15 PROCEDURE — 97530 THERAPEUTIC ACTIVITIES: CPT

## 2023-12-15 NOTE — FLOWSHEET NOTE
Outpatient Physical Therapy  [place]           [x] Phone: [phone]   Fax: [phone]  [place]           [] Phone: [phone]   Fax: [phone]        Physical Therapy Daily Treatment Note  Date:  12/15/2023    Patient Name:  Denice Severs    :  1945  MRN: [id number]  Restrictions/Precautions: NONE  Diagnosis:   No admission diagnoses are documented for this encounter. Diagnosis: low back pain/abn gait  Date of Injury/Surgery: --  Treatment Diagnosis:  Decreased activity tolerance limtied by LBP  Insurance/Certification information: [facility] Medicare  Referring Physician:  MD Jessica Villar   PCP: RORO Regalado NP  Next Doctor Visit:  --  Plan of care signed (Y/N):  N, sent 23  Outcome Measure: Oswestry:   Visit# / total visits:     Pain level: 1 /10   Goals:     Patient goals: improve walking and pain in her back  Short term goals  Time Frame for Short term goals: 4 weeks  Pt demo I w/ HEP and symptom management  Pt demo ability to walk >800 ft in 6 minutes without rest breaks or increased pain  Pt demo >4/5 BLE strength in all directions without increased pain  Pt demo x10 STS with good TA activation and PPT and no UE assist  Pt demo Oswestry score <30% disability to improve tolerance to ADL's    Summary of Evaluation:  Assessment: Patient is a 70 y/o female is experiencing new onset of low back pain that has worsened over the last year. Patient describes as an ache that is worse with walking, initiation of movement, and standing for long periods of time. She does use a cane for longer distances. Significant PMH including lumbar laminectomy on 3/16/21 involving L3-4 by Dr. Aster Chicas, diabetic neuropathy, malignant lymphoplasmacytic lymphoma , and Uterine cancer in . Pt would benefit from PT intervention to address interventions listed above to improve return to PLOF. Subjective:  Pt stated a 1 /10 today.  Pt stated that when she

## 2023-12-27 ENCOUNTER — HOSPITAL ENCOUNTER (OUTPATIENT)
Dept: PHYSICAL THERAPY | Age: 78
Discharge: HOME | End: 2023-12-27
Payer: MEDICARE

## 2023-12-27 PROCEDURE — 97112 NEUROMUSCULAR REEDUCATION: CPT

## 2023-12-27 PROCEDURE — 97110 THERAPEUTIC EXERCISES: CPT

## 2023-12-27 NOTE — FLOWSHEET NOTE
Outpatient Physical Therapy  [place]           [x] Phone: [phone]   Fax: [phone]  [place]           [] Phone: [phone]   Fax: [phone]        Physical Therapy Daily Treatment Note  Date:  2023    Patient Name:  Saba Issa    :  1945  MRN: [id number]  Restrictions/Precautions: NONE  Diagnosis:   No admission diagnoses are documented for this encounter. Diagnosis: low back pain/abn gait  Date of Injury/Surgery: --  Treatment Diagnosis:  Decreased activity tolerance limtied by LBP  Insurance/Certification information: [facility] Medicare  Referring Physician:  MD Linda Kohli-PETRA   PCP: RORO [place] NP  Next Doctor Visit:  --  Plan of care signed (Y/N):  N, sent 23  Outcome Measure: Oswestry:   Visit# / total visits:     Pain level: 2/10  Goals:     Patient goals: improve walking and pain in her back  Short term goals  Time Frame for Short term goals: 4 weeks  Pt demo I w/ HEP and symptom management  Pt demo ability to walk >800 ft in 6 minutes without rest breaks or increased pain  Pt demo >4/5 BLE strength in all directions without increased pain  Pt demo x10 STS with good TA activation and PPT and no UE assist  Pt demo Oswestry score <30% disability to improve tolerance to ADL's    Summary of Evaluation:  Assessment: Patient is a 68 y/o female is experiencing new onset of low back pain that has worsened over the last year. Patient describes as an ache that is worse with walking, initiation of movement, and standing for long periods of time. She does use a cane for longer distances. Significant PMH including lumbar laminectomy on 3/16/21 involving L3-4 by Dr. Annie Nieto, diabetic neuropathy, malignant lymphoplasmacytic lymphoma , and Uterine cancer in . Pt would benefit from PT intervention to address interventions listed above to improve return to PLOF. Subjective:  Pt reports no cramps or pain today.  A couple of days

## 2023-12-29 ENCOUNTER — HOSPITAL ENCOUNTER (OUTPATIENT)
Dept: PHYSICAL THERAPY | Age: 78
Discharge: HOME | End: 2023-12-29
Payer: MEDICARE

## 2023-12-29 PROCEDURE — 97110 THERAPEUTIC EXERCISES: CPT

## 2023-12-29 PROCEDURE — 97530 THERAPEUTIC ACTIVITIES: CPT

## 2023-12-29 NOTE — FLOWSHEET NOTE
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 Any changes in Ambulatory Summary Sheet? None    Objective:   Needed max cues for PPT initally, but improved after a few reps  Needed cues with \"dead bug\"  Exercises: (No more than 4 columns)   Exercise/Equipment Date 12/20/23 #3 12/22/23 #4 12/27/23 #5 12/29/2023 #6            WARM UP        Nu Step   Lv 2 x5' Lv3 5' Lv3 5' L-3 x 6'           TABLE       *Pelvic Tilt Supine 3\" 1x10 Supine x10 3\" Supine x12 3\" Supine 15x 5\"    *TA hold Supine 3\" 1x10 Supine x10 3\" Supine x12 3\" 10x 5\"    *SL Clamshells RTB Supine RTB 1x10 Supine RTB x10 + TA Supine RTB x12 + TA H/L RTB 10 x 2    Adductor squeeze 5\" hold 1x10, no cramping today 5\" 2x10 5\" 2x10 10 x 2 5\"    Bridge 3\" hold partial lift x5 reps - stopped due to R hamstring starting to cramp Hold cramping  held    Modified Dead Bug   NEXT 10x    STANDING       STS 20\" 1x10 20\" x10 20\" x12 20\" 10x2   Pallof Press Single RTB 1x10 ea side RTB x10 ea side RTB x12 ea side RTB 10x ea side   Weighted rotation hold      3-way-hip No band today 1x10 R/L No band x10 abd/ext No band x12 abd/ext ea side/dir No band 10x ea LE ea dir   Mid Rows  1x10 RTB X10 RTB 2x10 RTB RTB 10x2   Marches   X20 alt + TA at counter    Bending Fwd + TA activation        NEXT 10x 5\" with BSB          PROPRIOCEPTION                                          MODALITIES                         Other Therapeutic Activities/Education:  Patient received education on their current pathology and how their condition effects them with their functional activities. Patient understood discussion and questions were answered. Patient understands their activity limitations and understands rational for treatment progression. Home Exercise Program:  *HO issued, reviewed and discussed with patient. All questions answered. Pt agreed to comply.     12/20/23 modified clamshell to supine w/RTB and to perform standing 3 way hip w/o band then progress

## 2024-01-02 ENCOUNTER — HOSPITAL ENCOUNTER (OUTPATIENT)
Dept: PHYSICAL THERAPY | Age: 79
Discharge: HOME | End: 2024-01-02
Payer: MEDICARE

## 2024-01-02 PROCEDURE — 97110 THERAPEUTIC EXERCISES: CPT

## 2024-01-02 PROCEDURE — 97112 NEUROMUSCULAR REEDUCATION: CPT

## 2024-01-02 NOTE — FLOWSHEET NOTE
Outpatient Physical Therapy  [place]           [x] Phone: [phone]   Fax: [phone]  [place]           [] Phone: [phone]   Fax: [phone]        Physical Therapy Daily Treatment Note  Date:  2024    Patient Name:  Megan Lomeli    :  1945  MRN: [id number]  Restrictions/Precautions: NONE  Diagnosis:   No admission diagnoses are documented for this encounter. Diagnosis: low back pain/abn gait  Date of Injury/Surgery: --  Treatment Diagnosis:  Decreased activity tolerance limtied by LBP  Insurance/Certification information: [facility] Medicare  Referring Physician:  Jed Pritchett MD Courtney Ashburn APRN-PETRA   PCP: Clementina Alexis APRN - NP  Next Doctor Visit:  --  Plan of care signed (Y/N):  yes  Outcome Measure: Oswestry:   Visit# / total visits:   7/10  Pain level: 0/10       Goals:     Patient goals: improve walking and pain in her back  Short term goals  Time Frame for Short term goals: 4 weeks  Pt demo I w/ HEP and symptom management  Pt demo ability to walk >800 ft in 6 minutes without rest breaks or increased pain  Pt demo >4/5 BLE strength in all directions without increased pain  Pt demo x10 STS with good TA activation and PPT and no UE assist  Pt demo Oswestry score <30% disability to improve tolerance to ADL's    Summary of Evaluation:  Assessment: Patient is a 79 y/o female is experiencing new onset of low back pain that has worsened over the last year. Patient describes as an ache that is worse with walking, initiation of movement, and standing for long periods of time. She does use a cane for longer distances. Significant PMH including lumbar laminectomy on 3/16/21 involving L3-4 by Dr. Victoria, diabetic neuropathy, malignant lymphoplasmacytic lymphoma , and Uterine cancer in . Pt would benefit from PT intervention to address interventions listed above to improve return to PLOF.      Subjective:  Pt rated her pain at 0/10 today. Pt stated that she hasn't done

## 2024-01-05 ENCOUNTER — HOSPITAL ENCOUNTER (OUTPATIENT)
Dept: INFUSION THERAPY | Age: 79
Discharge: HOME | End: 2024-01-05
Payer: MEDICARE

## 2024-01-05 ENCOUNTER — HOSPITAL ENCOUNTER (OUTPATIENT)
Dept: PHYSICAL THERAPY | Age: 79
Discharge: HOME | End: 2024-01-05
Payer: MEDICARE

## 2024-01-05 DIAGNOSIS — C83.00 MALIGNANT LYMPHOPLASMACYTIC LYMPHOMA (HCC): Primary | ICD-10-CM

## 2024-01-05 PROCEDURE — 97110 THERAPEUTIC EXERCISES: CPT

## 2024-01-05 PROCEDURE — 2580000003 HC RX 258: Performed by: INTERNAL MEDICINE

## 2024-01-05 PROCEDURE — 97530 THERAPEUTIC ACTIVITIES: CPT

## 2024-01-05 PROCEDURE — 6360000002 HC RX W HCPCS: Performed by: INTERNAL MEDICINE

## 2024-01-05 PROCEDURE — 96523 IRRIG DRUG DELIVERY DEVICE: CPT

## 2024-01-05 RX ORDER — SODIUM CHLORIDE 0.9 % (FLUSH) 0.9 %
10 SYRINGE (ML) INJECTION PRN
OUTPATIENT
Start: 2024-01-05

## 2024-01-05 RX ORDER — SODIUM CHLORIDE 0.9 % (FLUSH) 0.9 %
10 SYRINGE (ML) INJECTION PRN
Status: DISCONTINUED | OUTPATIENT
Start: 2024-01-05 | End: 2024-01-06 | Stop reason: HOSPADM

## 2024-01-05 RX ORDER — WATER 10 ML/10ML
2.2 INJECTION INTRAMUSCULAR; INTRAVENOUS; SUBCUTANEOUS ONCE
OUTPATIENT
Start: 2024-01-05 | End: 2024-01-05

## 2024-01-05 RX ORDER — HEPARIN 100 UNIT/ML
500 SYRINGE INTRAVENOUS PRN
OUTPATIENT
Start: 2024-01-05

## 2024-01-05 RX ORDER — SODIUM CHLORIDE 0.9 % (FLUSH) 0.9 %
20 SYRINGE (ML) INJECTION PRN
OUTPATIENT
Start: 2024-01-05

## 2024-01-05 RX ORDER — SODIUM CHLORIDE 0.9 % (FLUSH) 0.9 %
20 SYRINGE (ML) INJECTION PRN
Status: DISCONTINUED | OUTPATIENT
Start: 2024-01-05 | End: 2024-01-06 | Stop reason: HOSPADM

## 2024-01-05 RX ORDER — HEPARIN 100 UNIT/ML
500 SYRINGE INTRAVENOUS PRN
Status: DISCONTINUED | OUTPATIENT
Start: 2024-01-05 | End: 2024-01-06 | Stop reason: HOSPADM

## 2024-01-05 RX ADMIN — HEPARIN 500 UNITS: 100 SYRINGE at 15:08

## 2024-01-05 RX ADMIN — SODIUM CHLORIDE, PRESERVATIVE FREE 20 ML: 5 INJECTION INTRAVENOUS at 15:08

## 2024-01-05 NOTE — PROGRESS NOTES
Ambulated to infusion area. Here for port flush.     Mediport accessed, positive blood return, flushed per protocol. Tolerated well. No concerns voiced. Discharged in stable condition. AVS provided.

## 2024-01-05 NOTE — FLOWSHEET NOTE
return to PLOF.      Subjective: Pt rated pain at 2/10 today.   Pt stated that she felt really good after her last visit, but that she had to do a lot of walking for one of her 's doctor's appointments and that pain increased in R hip and hurt most of the day yesterday. Pt stated that her pain was about 7-8/10 yesterday.     Any changes in Ambulatory Summary Sheet?  None    Objective:  R hamstring cramp during \"dead bug\" that improved after manual stretching.     Exercises: (No more than 4 columns)   Exercise/Equipment 12/27/23 #5 12/29/2023 #6 1/2/2023 #7 1/5/2024 #8            WARM UP        Nu Step   Lv3 5' L-3 x 6'  L-3 x 6'  L-5 [x 5'           TABLE       *Pelvic Tilt Supine x12 3\" Supine 15x 5\"  Supine 10 x 2 5\"  Supine 10 x 2 5\"    *TA hold Supine x12 3\" 10x 5\"  10x5\"     *SL Clamshells RTB Supine RTB x12 + TA H/L RTB 10 x 2  H/L RTB 10 x 2  GTB 10 x 2    Adductor squeeze 5\" 2x10 10 x 2 5\"  10 x 2 5\"  10 x 2 5\"    Bridge held      Modified Dead Bug NEXT 10x  10x  15x    STANDING       STS 20\" x12 20\" 10x2 20\"  10x2 20\" 10x2   Pallof Press RTB x12 ea side RTB 10x ea side RTB 10x ea side --   Weighted rotation       3-way-hip No band x12 abd/ext ea side/dir No band 10x ea LE ea dir No band 10 x 2 ea LE ( hip abd/ hip extension) --   Mid Rows  2x10 RTB RTB 10x2 RTB 10x2 -   Marches X20 alt + TA at counter  X20 alt + TA at counter --   Bending Fwd + TA activation      NEXT 10x 5\" with BSB 10x 5\" with BSB --          PROPRIOCEPTION                                          MODALITIES                         Other Therapeutic Activities/Education:  Patient received education on their current pathology and how their condition effects them with their functional activities. Patient understood discussion and questions were answered. Patient understands their activity limitations and understands rational for treatment progression.       Home Exercise Program:  *HO issued, reviewed and discussed with patient. All

## 2024-01-08 ENCOUNTER — HOSPITAL ENCOUNTER (OUTPATIENT)
Dept: PHYSICAL THERAPY | Age: 79
Discharge: HOME | End: 2024-01-08
Payer: MEDICARE

## 2024-01-08 PROCEDURE — 97110 THERAPEUTIC EXERCISES: CPT

## 2024-01-08 PROCEDURE — 97530 THERAPEUTIC ACTIVITIES: CPT

## 2024-01-08 NOTE — PROGRESS NOTES
requesting.    Electronically signed by:  Rosie Ulloa PT, DPT, 1/8/2024, 8:37 AM    If you have any questions or concerns, please don't hesitate to call.  Thank you for your referral.    Physician Signature:______________________ Date:______ Time: ________  By signing above, therapist’s plan is approved by physician

## 2024-01-08 NOTE — FLOWSHEET NOTE
Outpatient Physical Therapy  [place]           [x] Phone: [phone]   Fax: [phone]  [place]           [] Phone: [phone]   Fax: [phone]        Physical Therapy Daily Treatment Note  Date:  2024    Patient Name:  Megan Lomeli    :  1945  MRN: [id number]  Restrictions/Precautions: NONE  Diagnosis:   No admission diagnoses are documented for this encounter. Diagnosis: low back pain/abn gait  Date of Injury/Surgery: --  Treatment Diagnosis:  Decreased activity tolerance limtied by LBP   Pt approved for 8 visits from 24-24     [id number]  only  Insurance/Certification information: [employer]  Referring Physician:  Jed Pritchett MD Courtney Ashburn APRN-NP   PCP: Clementina Alexis APRN - NP  Next Doctor Visit:  --  Plan of care signed (Y/N):  yes  Outcome Measure: Oswestry:   Visit# / total visits:    by 24  Pain level: 0/10       Goals:     Patient goals: improve walking and pain in her back  Short term goals  Time Frame for Short term goals: 4 weeks  Pt demo I w/ HEP and symptom management reports compliance   Pt demo ability to walk >800 ft in 6 minutes without rest breaks or increased pain MET except increased pain still  Pt demo >4/5 BLE strength in all directions without increased pain MET  Pt demo x10 STS with good TA activation and PPT and no UE assist Improved  Pt demo Oswestry score <30% disability to improve tolerance to ADL's MET    Summary of Evaluation:  Assessment: Patient is a 77 y/o female is experiencing new onset of low back pain that has worsened over the last year. Patient describes as an ache that is worse with walking, initiation of movement, and standing for long periods of time. She does use a cane for longer distances. Significant PMH including lumbar laminectomy on 3/16/21 involving L3-4 by Dr. Victoria, diabetic neuropathy, malignant lymphoplasmacytic lymphoma , and Uterine cancer in . Pt would

## 2024-01-16 ENCOUNTER — HOSPITAL ENCOUNTER (OUTPATIENT)
Dept: PHYSICAL THERAPY | Age: 79
Discharge: HOME | End: 2024-01-16
Payer: MEDICARE

## 2024-01-16 PROCEDURE — 97530 THERAPEUTIC ACTIVITIES: CPT

## 2024-01-16 PROCEDURE — 97110 THERAPEUTIC EXERCISES: CPT

## 2024-01-16 NOTE — FLOWSHEET NOTE
Outpatient Physical Therapy  [place]           [x] Phone: [phone]   Fax: [phone]  [place]           [] Phone: [phone]   Fax: [phone]        Physical Therapy Daily Treatment Note  Date:  2024    Patient Name:  Megan Lomeli    :  1945  MRN: [id number]  Restrictions/Precautions: NONE  Diagnosis:   No admission diagnoses are documented for this encounter. Diagnosis: low back pain/abn gait  Date of Injury/Surgery: --  Treatment Diagnosis:  Decreased activity tolerance limtied by LBP   Pt approved for 8 visits from 24-24     [id number]  only  Insurance/Certification information: [employer]  Referring Physician:  Jed Pritchett MD Courtney Ashburn APRN-NP   PCP: Clementina Alexis APRN - NP  Next Doctor Visit:  --  Plan of care signed (Y/N):  yes  Outcome Measure: Oswestry:   Visit# / total visits:   3/8 by 24  Pain level: 0/10       Goals:     Patient goals: improve walking and pain in her back  Short term goals  Time Frame for Short term goals: 4 weeks  Pt demo I w/ HEP and symptom management reports compliance   Pt demo ability to walk >800 ft in 6 minutes without rest breaks or increased pain MET except increased pain still  Pt demo >4/5 BLE strength in all directions without increased pain MET  Pt demo x10 STS with good TA activation and PPT and no UE assist Improved  Pt demo Oswestry score <30% disability to improve tolerance to ADL's MET    Summary of Evaluation:  Assessment: Patient is a 77 y/o female is experiencing new onset of low back pain that has worsened over the last year. Patient describes as an ache that is worse with walking, initiation of movement, and standing for long periods of time. She does use a cane for longer distances. Significant PMH including lumbar laminectomy on 3/16/21 involving L3-4 by Dr. Victoria, diabetic neuropathy, malignant lymphoplasmacytic lymphoma , and Uterine cancer in . Pt would

## 2024-01-23 ENCOUNTER — HOSPITAL ENCOUNTER (OUTPATIENT)
Dept: PHYSICAL THERAPY | Age: 79
Discharge: HOME | End: 2024-01-23
Payer: MEDICARE

## 2024-01-23 PROCEDURE — 97110 THERAPEUTIC EXERCISES: CPT

## 2024-01-23 PROCEDURE — 97530 THERAPEUTIC ACTIVITIES: CPT

## 2024-01-23 NOTE — FLOWSHEET NOTE
Outpatient Physical Therapy  [place]           [x] Phone: [phone]   Fax: [phone]  [place]           [] Phone: [phone]   Fax: [phone]        Physical Therapy Daily Treatment Note  Date:  2024    Patient Name:  Megan Lomeli    :  1945  MRN: [id number]  Restrictions/Precautions: NONE  Diagnosis:   No admission diagnoses are documented for this encounter. Diagnosis: low back pain/abn gait  Date of Injury/Surgery: --  Treatment Diagnosis:  Decreased activity tolerance limtied by LBP   Pt approved for 8 visits from 24-24     [id number]  only  Insurance/Certification information: [employer]  Referring Physician:  Jed Pritchett MD Courtney Ashburn APRN-NP   PCP: Clementina Alexis APRN - NP  Next Doctor Visit:  --  Plan of care signed (Y/N):  yes  Outcome Measure: Oswestry:   Visit# / total visits:    by 24  Pain level: 0/10       Goals:     Patient goals: improve walking and pain in her back  Short term goals  Time Frame for Short term goals: 4 weeks  Pt demo I w/ HEP and symptom management reports compliance   Pt demo ability to walk >800 ft in 6 minutes without rest breaks or increased pain MET except increased pain still  Pt demo >4/5 BLE strength in all directions without increased pain MET  Pt demo x10 STS with good TA activation and PPT and no UE assist Improved  Pt demo Oswestry score <30% disability to improve tolerance to ADL's MET    Summary of Evaluation:  Assessment: Patient is a 77 y/o female is experiencing new onset of low back pain that has worsened over the last year. Patient describes as an ache that is worse with walking, initiation of movement, and standing for long periods of time. She does use a cane for longer distances. Significant PMH including lumbar laminectomy on 3/16/21 involving L3-4 by Dr. Victoria, diabetic neuropathy, malignant lymphoplasmacytic lymphoma , and Uterine cancer in . Pt would

## 2024-01-30 ENCOUNTER — HOSPITAL ENCOUNTER (OUTPATIENT)
Dept: PHYSICAL THERAPY | Age: 79
Discharge: HOME | End: 2024-01-30
Payer: MEDICARE

## 2024-01-30 PROCEDURE — 97110 THERAPEUTIC EXERCISES: CPT

## 2024-01-30 PROCEDURE — 97530 THERAPEUTIC ACTIVITIES: CPT

## 2024-01-30 NOTE — FLOWSHEET NOTE
today's session without adverse reaction. Doing well with exercises and has been compliant at home. Will complete discharge next session to continue her exercises independently.  Pt rated pain at 0/10 after treatment.      Assessment: Patient is a 77 y/o female is experiencing new onset of low back pain that has worsened over the last year. Patient describes as an ache that is worse with walking, initiation of movement, and standing for long periods of time. She does mya a cane for longer distances. Significant PMH including lumbar laminectomy on 3/16/21 involving L3-4 by Dr. Victoria, diabetic neuropathy, malignant lymphoplasmacytic lymphoma 2015, and Uterine cancer in 1987. Pt would benefit from PT intervention to address interventions listed above to improve return to PLOF.      Plan for Next Session:        Time In / Time Out: 9017-5490      Timed Code/Total Treatment Minutes: 40/40,  2 TE 1 TA      Next Progress Note due:  10th visit      Plan of Care Interventions:  [x] Therapeutic Exercise  [] Modalities:  [x] Therapeutic Activity     [] Ultrasound  [] Estim  [x] Gait Training      [] Cervical Traction [] Lumbar Traction  [x] Neuromuscular Re-education    [x] Cold/hotpack [] Iontophoresis   [x] Instruction in HEP      [] Vasopneumatic   [] Dry Needling    [] Manual Therapy               [] Aquatic Therapy              Electronically signed by:  Rosie Ulloa PT, DPT 1/30/2024, 6:45 AM

## 2024-02-09 ENCOUNTER — HOSPITAL ENCOUNTER (OUTPATIENT)
Dept: PHYSICAL THERAPY | Age: 79
Discharge: HOME | End: 2024-02-09
Payer: MEDICARE

## 2024-02-09 PROCEDURE — 97110 THERAPEUTIC EXERCISES: CPT

## 2024-02-09 PROCEDURE — 97530 THERAPEUTIC ACTIVITIES: CPT

## 2024-02-09 NOTE — FLOWSHEET NOTE
Uterine cancer in 1987. Pt would benefit from PT intervention to address interventions listed above to improve return to PLOF.    Subjective:  Megan reports she has no back pain today. Does not feel she has been walking different. Has increased stiffness and some mild pain when she gets up in the morning. She has not been using her cane much anymore.      Any changes in Ambulatory Summary Sheet?  None    Objective:     Continues to have some pain with mopping and sweeping, did discuss modifying her position and moving more with her trunk/legs instead of twisting/bending at her spine    Exercises: (No more than 4 columns)   Exercise/Equipment 1/23/24 #11 1/30/24 #12 2/9/24 #13           WARM UP       Nu Step   L5 x5' Lv5 5'          TABLE      *Pelvic Tilt Supine 5\" 2x10 Supine 2x10 5\"    *TA hold      *SL Clamshells RTB Supine GTB 2x10     Adductor squeeze 5\" 2x10 5\" 2x10    Bridge  2x10    Modified Dead Bug 15x X15 ea side     STANDING      STS 19.5\" 2x10 19.5\" 2x10    Pallof Press RTB 10x ea side  RTB x10 ea side     Weighted rotation      3-way-hip No wt, hip abd and ext 2x10 R/L 2x10 ea side     Mid Rows  RTB 2x10 RTB 2x10    Marches 20x alt X20 alt     Bending Fwd + TA activation      5\" 1x10           PROPRIOCEPTION                                    MODALITIES                      Other Therapeutic Activities/Education:  Patient received education on their current pathology and how their condition effects them with their functional activities. Patient understood discussion and questions were answered. Patient understands their activity limitations and understands rational for treatment progression.       Home Exercise Program:  *HO issued, reviewed and discussed with patient. All questions answered. Pt agreed to comply.    12/20/23 modified clamshell to supine w/RTB and to perform standing 3 way hip w/o band then progress back into using band as tolerated. Hold bridges for now due to cramping.    Manual

## 2024-02-09 NOTE — DISCHARGE SUMMARY
Outpatient Physical Therapy           [place]           [] Phone: [phone]   Fax: [phone]  [place]           [] Phone: [phone]   Fax: [phone]      To: Jed Pritchett MD     From: Rosie Ulloa PT, PT     Patient: Megan Lomeli                    : 1945  Diagnosis:  No admission diagnoses are documented for this encounter.        Treatment Diagnosis:  Decreased activity tolerance limtied by LBP     Date: 2024  []  Progress Note                [x]  Discharge Note    Evaluation Date:   23  Total Visits to date:   13 Cancels/No-shows to date:  1    Subjective:  Megan reports she has no back pain today. Does not feel she has been walking different. Has increased stiffness and some mild pain when she gets up in the morning. She has not been using her cane much anymore.     Plan of Care/Treatment to date:  [x] Therapeutic Exercise    [] Modalities:  [x] Therapeutic Activity     [] Ultrasound  [] Electrical Stimulation  [x] Gait Training      [] Cervical Traction   [] Lumbar Traction  [x] Neuromuscular Re-education  [x] Cold/hotpack [] Iontophoresis  [x] Instruction in HEP      Other:  [x] Manual Therapy       []  Vasopneumatic  [] Aquatic Therapy       []   Dry Needle Therapy                      Objective/Significant Findings At Last Visit/Comments:      Left Strength  Right Strength      Strength LLE  L Hip Flexion: 5/5  L Hip ABduction: 5/5  L Hip ADduction: 5/5  L Knee Flexion: 5/5  L Knee Extension: 5/5  L Ankle Dorsiflexion: 5/5 Strength RLE  R Hip Flexion: 5/5  R Hip ABduction: 5/5  R Hip ADduction: 5/5  R Knee Flexion: 5/5  R Knee Extension: 5/5  R Ankle Dorsiflexion: 5/5          Lumbar Assessment   AROM Lumbar Spine   Lumbar spine general AROM: Flexion: able to reach shins, \"pulling in her back\"  Extension: restricted 25%, no increased pain  Rotation: able to clear ASIS, no increased pain       Trunk Strength      Fair-Fair+ posterior pelvic tilt + TA activation, no

## 2024-03-20 ENCOUNTER — HOSPITAL ENCOUNTER (OUTPATIENT)
Dept: INFUSION THERAPY | Age: 79
Discharge: HOME | End: 2024-03-20
Payer: MEDICARE

## 2024-03-20 DIAGNOSIS — D05.11 DUCTAL CARCINOMA IN SITU (DCIS) OF RIGHT BREAST: ICD-10-CM

## 2024-03-20 DIAGNOSIS — C83.00 MALIGNANT LYMPHOPLASMACYTIC LYMPHOMA (HCC): Primary | ICD-10-CM

## 2024-03-20 LAB
ALBUMIN SERPL-MCNC: 4.1 GM/DL (ref 3.4–5)
ALP BLD-CCNC: 57 IU/L (ref 40–128)
ALT SERPL-CCNC: 17 U/L (ref 10–40)
ANION GAP SERPL CALCULATED.3IONS-SCNC: 13 MMOL/L (ref 7–16)
AST SERPL-CCNC: 20 IU/L (ref 15–37)
BASOPHILS ABSOLUTE: 0 K/CU MM
BASOPHILS RELATIVE PERCENT: 0.4 % (ref 0–1)
BILIRUB SERPL-MCNC: 0.4 MG/DL (ref 0–1)
BUN SERPL-MCNC: 22 MG/DL (ref 6–23)
CALCIUM SERPL-MCNC: 9.3 MG/DL (ref 8.3–10.6)
CHLORIDE BLD-SCNC: 99 MMOL/L (ref 99–110)
CO2: 24 MMOL/L (ref 21–32)
CREAT SERPL-MCNC: 0.9 MG/DL (ref 0.6–1.1)
DIFFERENTIAL TYPE: ABNORMAL
EOSINOPHILS ABSOLUTE: 0.1 K/CU MM
EOSINOPHILS RELATIVE PERCENT: 1.4 % (ref 0–3)
ERYTHROCYTE SEDIMENTATION RATE: 3 MM/HR (ref 0–30)
GFR SERPL CREATININE-BSD FRML MDRD: >60 ML/MIN/1.73M2
GLUCOSE SERPL-MCNC: 165 MG/DL (ref 70–99)
HCT VFR BLD CALC: 37.7 % (ref 37–47)
HEMOGLOBIN: 13.2 GM/DL (ref 12.5–16)
IGA: 261 MG/DL (ref 69–382)
IGG,SERUM: 345 MG/DL (ref 723–1685)
IGM,SERUM: 211 MG/DL (ref 62–277)
LACTATE DEHYDROGENASE: 190 IU/L (ref 120–246)
LYMPHOCYTES ABSOLUTE: 1.3 K/CU MM
LYMPHOCYTES RELATIVE PERCENT: 26.9 % (ref 24–44)
MCH RBC QN AUTO: 33.1 PG (ref 27–31)
MCHC RBC AUTO-ENTMCNC: 35 % (ref 32–36)
MCV RBC AUTO: 94.5 FL (ref 78–100)
MONOCYTES ABSOLUTE: 0.4 K/CU MM
MONOCYTES RELATIVE PERCENT: 8.4 % (ref 0–4)
PDW BLD-RTO: 13.5 % (ref 11.7–14.9)
PLATELET # BLD: 156 K/CU MM (ref 140–440)
PMV BLD AUTO: 8.5 FL (ref 7.5–11.1)
POTASSIUM SERPL-SCNC: 3.7 MMOL/L (ref 3.5–5.1)
RBC # BLD: 3.99 M/CU MM (ref 4.2–5.4)
SEGMENTED NEUTROPHILS ABSOLUTE COUNT: 3.1 K/CU MM
SEGMENTED NEUTROPHILS RELATIVE PERCENT: 62.9 % (ref 36–66)
SODIUM BLD-SCNC: 136 MMOL/L (ref 135–145)
TOTAL PROTEIN: 6 GM/DL (ref 6.4–8.2)
WBC # BLD: 4.9 K/CU MM (ref 4–10.5)

## 2024-03-20 PROCEDURE — 86320 SERUM IMMUNOELECTROPHORESIS: CPT

## 2024-03-20 PROCEDURE — 83615 LACTATE (LD) (LDH) ENZYME: CPT

## 2024-03-20 PROCEDURE — 83883 ASSAY NEPHELOMETRY NOT SPEC: CPT

## 2024-03-20 PROCEDURE — 84165 PROTEIN E-PHORESIS SERUM: CPT

## 2024-03-20 PROCEDURE — 36591 DRAW BLOOD OFF VENOUS DEVICE: CPT

## 2024-03-20 PROCEDURE — 85025 COMPLETE CBC W/AUTO DIFF WBC: CPT

## 2024-03-20 PROCEDURE — 80053 COMPREHEN METABOLIC PANEL: CPT

## 2024-03-20 PROCEDURE — 82784 ASSAY IGA/IGD/IGG/IGM EACH: CPT

## 2024-03-20 PROCEDURE — 85652 RBC SED RATE AUTOMATED: CPT

## 2024-03-20 PROCEDURE — 84155 ASSAY OF PROTEIN SERUM: CPT

## 2024-03-20 PROCEDURE — 82232 ASSAY OF BETA-2 PROTEIN: CPT

## 2024-03-20 PROCEDURE — 6360000002 HC RX W HCPCS

## 2024-03-20 RX ORDER — SODIUM CHLORIDE 0.9 % (FLUSH) 0.9 %
20 SYRINGE (ML) INJECTION PRN
OUTPATIENT
Start: 2024-03-20

## 2024-03-20 RX ORDER — HEPARIN 100 UNIT/ML
500 SYRINGE INTRAVENOUS PRN
OUTPATIENT
Start: 2024-03-20

## 2024-03-20 RX ORDER — HEPARIN 100 UNIT/ML
500 SYRINGE INTRAVENOUS PRN
Status: DISCONTINUED | OUTPATIENT
Start: 2024-03-20 | End: 2024-03-21 | Stop reason: HOSPADM

## 2024-03-20 RX ORDER — SODIUM CHLORIDE 0.9 % (FLUSH) 0.9 %
10 SYRINGE (ML) INJECTION PRN
OUTPATIENT
Start: 2024-03-20

## 2024-03-20 RX ORDER — WATER 10 ML/10ML
2.2 INJECTION INTRAMUSCULAR; INTRAVENOUS; SUBCUTANEOUS ONCE
OUTPATIENT
Start: 2024-03-20 | End: 2024-03-20

## 2024-03-20 RX ORDER — HEPARIN 100 UNIT/ML
SYRINGE INTRAVENOUS
Status: COMPLETED
Start: 2024-03-20 | End: 2024-03-20

## 2024-03-20 RX ADMIN — HEPARIN 500 UNITS: 100 SYRINGE at 15:32

## 2024-03-20 NOTE — PROGRESS NOTES
Pt here for port draw, after multiple attempts to get blood return, blood return finally brisk.   Blood work drawn and sent to lab. Pt declined paperwork or lab results, left ambulatory

## 2024-03-22 LAB
ALBUMIN SERPL ELPH-MCNC: 3.5 GM/DL (ref 3.2–5.6)
ALPHA-1-GLOBULIN: 0.2 GM/DL (ref 0.1–0.4)
ALPHA-2-GLOBULIN: 0.7 GM/DL (ref 0.4–1.2)
B2 MICROGLOB SERPL-MCNC: 2.5 MG/L
BETA GLOBULIN: 1 GM/DL (ref 0.5–1.3)
GAMMA GLOBULIN: 0.6 GM/DL (ref 0.5–1.6)
KAPPA LC FREE SER-MCNC: 23.06 MG/L (ref 3.3–19.4)
KAPPA LC FREE/LAMBDA FREE SER NEPH: 2.18 {RATIO} (ref 0.26–1.65)
LAMBDA LC FREE SERPL-MCNC: 10.57 MG/L (ref 5.71–26.3)
SPEP INTERPRETATION: ABNORMAL
SPEP INTERPRETATION: NORMAL
TOTAL PROTEIN: 6 GM/DL (ref 6.4–8.2)

## 2024-03-27 ENCOUNTER — OFFICE VISIT (OUTPATIENT)
Dept: ONCOLOGY | Age: 79
End: 2024-03-27
Payer: MEDICARE

## 2024-03-27 ENCOUNTER — HOSPITAL ENCOUNTER (OUTPATIENT)
Dept: INFUSION THERAPY | Age: 79
Discharge: HOME | End: 2024-03-27
Payer: MEDICARE

## 2024-03-27 VITALS
HEIGHT: 62 IN | OXYGEN SATURATION: 98 % | SYSTOLIC BLOOD PRESSURE: 175 MMHG | DIASTOLIC BLOOD PRESSURE: 74 MMHG | BODY MASS INDEX: 28.78 KG/M2 | RESPIRATION RATE: 16 BRPM | TEMPERATURE: 97.3 F | HEART RATE: 68 BPM | WEIGHT: 156.4 LBS

## 2024-03-27 DIAGNOSIS — C83.00 MALIGNANT LYMPHOPLASMACYTIC LYMPHOMA (HCC): Primary | ICD-10-CM

## 2024-03-27 PROCEDURE — G8427 DOCREV CUR MEDS BY ELIG CLIN: HCPCS | Performed by: INTERNAL MEDICINE

## 2024-03-27 PROCEDURE — 99213 OFFICE O/P EST LOW 20 MIN: CPT | Performed by: INTERNAL MEDICINE

## 2024-03-27 PROCEDURE — 3077F SYST BP >= 140 MM HG: CPT | Performed by: INTERNAL MEDICINE

## 2024-03-27 PROCEDURE — 99211 OFF/OP EST MAY X REQ PHY/QHP: CPT

## 2024-03-27 PROCEDURE — G8399 PT W/DXA RESULTS DOCUMENT: HCPCS | Performed by: INTERNAL MEDICINE

## 2024-03-27 PROCEDURE — G8417 CALC BMI ABV UP PARAM F/U: HCPCS | Performed by: INTERNAL MEDICINE

## 2024-03-27 PROCEDURE — 1036F TOBACCO NON-USER: CPT | Performed by: INTERNAL MEDICINE

## 2024-03-27 PROCEDURE — 1090F PRES/ABSN URINE INCON ASSESS: CPT | Performed by: INTERNAL MEDICINE

## 2024-03-27 PROCEDURE — 1123F ACP DISCUSS/DSCN MKR DOCD: CPT | Performed by: INTERNAL MEDICINE

## 2024-03-27 PROCEDURE — 3078F DIAST BP <80 MM HG: CPT | Performed by: INTERNAL MEDICINE

## 2024-03-27 PROCEDURE — G8484 FLU IMMUNIZE NO ADMIN: HCPCS | Performed by: INTERNAL MEDICINE

## 2024-03-27 NOTE — PROGRESS NOTES
MA Rooming Questions  Patient: Megan Lomeli  MRN: [id number]    Date: 3/27/2024        1. Do you have any new issues?   no         2. Do you need any refills on medications?    no    3. Have you had any imaging done since your last visit?   yes - MRI @ [facility]    4. Have you been hospitalized or seen in the emergency room since your last visit here?   no    5. Did the patient have a depression screening completed today? No    No data recorded     PHQ-9 Given to (if applicable):               PHQ-9 Score (if applicable):                     [] Positive     []  Negative              Does question #9 need addressed (if applicable)                     [] Yes    []  No               Melanie Felix MA

## 2024-03-27 NOTE — PROGRESS NOTES
Patient Name: Megan Lomeli  Patient : 1945  Patient MRN: [id number]     Primary Oncologist: Jed Pritchett MD  Referring Provider: Clementina Alexis APRN - NP     Date of Service: 3/27/2024      Chief Complaint:   Chief Complaint   Patient presents with    Follow-up     Patient Active Problem List:     Restless leg syndrome     Hypertension     Diabetes mellitus with neuropathy (HCC)     Malignant lymphoplasmacytic lymphoma (HCC)     Osteopenia     Gastroesophageal reflux disease without esophagitis     Insomnia     Osteoporosis    HPI:   Ms. Lomeli is a 78-year-old very pleasant patient with medical history significant for hypertension, diabetes mellitus, uterine cancer status post hysterectomy in , and chronic kidney disease and discoid lupus erythematosus of eyelid, initially referred to me on 2015, for evaluation of monoclonal gammopathy to rule out plasma cell dyscrasia.      Ms. Lomeli stated that she was seen by Dr. Valdivia for proteinuria.  Dr. Valdivia recognized that the patient has elevated gamma globulin on complete metabolic panel and he sent for serum protein electrophoresis and serum free light chain assay.  She was found to have 3.17 grams of monoclonal protein on serum protein electrophoresis.  Her serum Kappa light chain was more than 700 and Kappa Lambda ratio was more than 70.  Because of significant monoclonal protein, she was subsequently referred to me for further evaluation.      Laboratory workups done on 2015, showed 2.4 grams of IgM Kappa monoclonal protein on serum protein electrophoresis and immunofixation.  LDH was 147, serum Kappa light chain was 121 mg/dL and Kappa Lambda ratio was 327.03.  Bone marrow biopsy showed clonal B-cell population detected (26 percent of analyzed cells), CD5 negative, CD10 negative with Kappa light chain restriction.  Plasma cells are essentially absent.  FISH study was negative.      Second opinion from the bone marrow Pathologist at

## 2024-04-29 ENCOUNTER — PROCEDURE VISIT (OUTPATIENT)
Dept: PHYSICAL MEDICINE AND REHAB | Age: 79
End: 2024-04-29
Payer: MEDICARE

## 2024-04-29 DIAGNOSIS — R20.2 PARESTHESIA AND PAIN OF BOTH UPPER EXTREMITIES: ICD-10-CM

## 2024-04-29 DIAGNOSIS — M54.12 C7 RADICULOPATHY: ICD-10-CM

## 2024-04-29 DIAGNOSIS — M79.601 PARESTHESIA AND PAIN OF BOTH UPPER EXTREMITIES: ICD-10-CM

## 2024-04-29 DIAGNOSIS — M79.602 PARESTHESIA AND PAIN OF BOTH UPPER EXTREMITIES: ICD-10-CM

## 2024-04-29 DIAGNOSIS — E11.42 DIABETIC SENSORIMOTOR POLYNEUROPATHY (HCC): ICD-10-CM

## 2024-04-29 DIAGNOSIS — G56.03 BILATERAL CARPAL TUNNEL SYNDROME: Primary | ICD-10-CM

## 2024-04-29 PROCEDURE — 95886 MUSC TEST DONE W/N TEST COMP: CPT | Performed by: PHYSICAL MEDICINE & REHABILITATION

## 2024-04-29 PROCEDURE — 95911 NRV CNDJ TEST 9-10 STUDIES: CPT | Performed by: PHYSICAL MEDICINE & REHABILITATION

## 2024-04-29 NOTE — PROGRESS NOTES
EMG REPORT     CHIEF COMPLAINT: Recent worsening of hand numbness.    HISTORY OF PRESENT ILLNESS: 78 y.o. right hand dominant female with numbness, tingling and hand pain for years.  She had left carpal tunnel release surgery several years ago with improvement in her symptoms.  About 6 weeks ago, she noted abrupt worsening of bilateral hand pain and tingling with numbness.  This is much beyond her normal numbness and tingling that she notes in her feet from her diabetic neuropathy.  Her entire palm and long finger seem most impacted and it is worse on the left than right.  She gets leg cramping but not hand cramping.  She does not sense any neck pain radiating into her upper limbs.  She rated the pain severity as 9/10.  She does not have any thyroid disorder in addition to her diabetes.  She has had lower back surgery in the last 3 years.      PHYSICAL EXAMINATION: Alert.  Only about 30 degrees of active cervical rotation to the left and 45 degrees to the right.  Spurling's maneuver was uncomfortable but did not reproduce limb symptoms.  Upper limb reflexes were absent.  Tinel's sign was positive at the left wrist.  Sensation seemed diminished in all fingertips.  Thumb opposition MMT was 3+/5 bilaterally.  Digit abduction and power  were 4+/5.  Proximal strength was normal.  There was no atrophy or tremor noted.  Perception of touch was diminished in all fingertips.      NERVE CONDUCTION STUDIES:     MOTOR         LATENCY NORMAL AMPLITUDE DISTANCE COND. LUDA.   RIGHT  MEDIAN 9.4 < 4.2 msec 1.7 8 cm 46   LEFT  MEDIAN 4.6 < 4.2 msec 5.1 8 cm 42   RIGHT  ULNAR 4.8 < 4.2 msec 3.6 8 cm 49   LEFT  ULNAR 3.9 < 4.2 msec 4.6/6.0 8 cm 44/42      SENSORY  ORTHODROMIC        LATENCY NORMAL AMPLITUDE DISTANCE   RIGHT MEDIAN Absent <2.3 msec  10 cm   LEFT  MEDIAN Absent < 2.3 msec  10 cm   RIGHT  ULNAR Absent < 2.3 msec  10 cm   LEFT  ULNAR Absent < 2.3 msec  10 cm       Left dorsal ulnar sensory: Absent.        NEEDLE

## 2024-06-19 ENCOUNTER — HOSPITAL ENCOUNTER (OUTPATIENT)
Dept: INFUSION THERAPY | Age: 79
Discharge: HOME | End: 2024-06-19
Payer: MEDICARE

## 2024-06-19 DIAGNOSIS — C83.00 MALIGNANT LYMPHOPLASMACYTIC LYMPHOMA (HCC): Primary | ICD-10-CM

## 2024-06-19 PROCEDURE — 2580000003 HC RX 258: Performed by: INTERNAL MEDICINE

## 2024-06-19 PROCEDURE — 96523 IRRIG DRUG DELIVERY DEVICE: CPT

## 2024-06-19 RX ORDER — SODIUM CHLORIDE 0.9 % (FLUSH) 0.9 %
20 SYRINGE (ML) INJECTION PRN
Status: DISCONTINUED | OUTPATIENT
Start: 2024-06-19 | End: 2024-06-20 | Stop reason: HOSPADM

## 2024-06-19 RX ORDER — SODIUM CHLORIDE 0.9 % (FLUSH) 0.9 %
10 SYRINGE (ML) INJECTION PRN
Status: DISCONTINUED | OUTPATIENT
Start: 2024-06-19 | End: 2024-06-20 | Stop reason: HOSPADM

## 2024-06-19 RX ORDER — WATER 10 ML/10ML
2.2 INJECTION INTRAMUSCULAR; INTRAVENOUS; SUBCUTANEOUS ONCE
OUTPATIENT
Start: 2024-06-19 | End: 2024-06-19

## 2024-06-19 RX ORDER — SODIUM CHLORIDE 0.9 % (FLUSH) 0.9 %
20 SYRINGE (ML) INJECTION PRN
OUTPATIENT
Start: 2024-06-19

## 2024-06-19 RX ORDER — SODIUM CHLORIDE 0.9 % (FLUSH) 0.9 %
10 SYRINGE (ML) INJECTION PRN
OUTPATIENT
Start: 2024-06-19

## 2024-06-19 RX ORDER — HEPARIN 100 UNIT/ML
500 SYRINGE INTRAVENOUS PRN
OUTPATIENT
Start: 2024-06-19

## 2024-06-19 RX ADMIN — SODIUM CHLORIDE, PRESERVATIVE FREE 20 ML: 5 INJECTION INTRAVENOUS at 15:18

## 2024-06-19 RX ADMIN — SODIUM CHLORIDE, PRESERVATIVE FREE 10 ML: 5 INJECTION INTRAVENOUS at 15:18

## 2024-06-19 NOTE — PROGRESS NOTES
Patient arrived to treatment suite for port flush.  right chest mediport accessed and flushed with normal saline, good blood return noted.  Flushed and saline locked, band-aid applied.  Patient tolerated well.  Left treatment suite ambulatory.  Discharge instructions declined.

## 2024-07-24 ENCOUNTER — HOSPITAL ENCOUNTER (OUTPATIENT)
Dept: INFUSION THERAPY | Age: 79
Discharge: HOME | End: 2024-07-24
Payer: MEDICARE

## 2024-07-24 DIAGNOSIS — C83.00 MALIGNANT LYMPHOPLASMACYTIC LYMPHOMA (HCC): Primary | ICD-10-CM

## 2024-07-24 LAB
ALBUMIN SERPL-MCNC: 4.1 GM/DL (ref 3.4–5)
ALP BLD-CCNC: 56 IU/L (ref 40–129)
ALT SERPL-CCNC: 18 U/L (ref 10–40)
ANION GAP SERPL CALCULATED.3IONS-SCNC: 12 MMOL/L (ref 7–16)
AST SERPL-CCNC: 19 IU/L (ref 15–37)
BASOPHILS ABSOLUTE: 0 K/CU MM
BASOPHILS RELATIVE PERCENT: 0.2 % (ref 0–1)
BILIRUB SERPL-MCNC: 0.4 MG/DL (ref 0–1)
BUN SERPL-MCNC: 19 MG/DL (ref 6–23)
CALCIUM SERPL-MCNC: 9.3 MG/DL (ref 8.3–10.6)
CHLORIDE BLD-SCNC: 99 MMOL/L (ref 99–110)
CO2: 25 MMOL/L (ref 21–32)
CREAT SERPL-MCNC: 0.8 MG/DL (ref 0.6–1.1)
DIFFERENTIAL TYPE: ABNORMAL
EOSINOPHILS ABSOLUTE: 0.1 K/CU MM
EOSINOPHILS RELATIVE PERCENT: 1.4 % (ref 0–3)
GFR, ESTIMATED: 76 ML/MIN/1.73M2
GLUCOSE SERPL-MCNC: 142 MG/DL (ref 70–99)
HCT VFR BLD CALC: 37.1 % (ref 37–47)
HEMOGLOBIN: 12.9 GM/DL (ref 12.5–16)
IGA: 270 MG/DL (ref 69–382)
IGG,SERUM: 355 MG/DL (ref 723–1685)
IGM,SERUM: 195 MG/DL (ref 62–277)
LACTATE DEHYDROGENASE: 180 IU/L (ref 120–246)
LYMPHOCYTES ABSOLUTE: 1.3 K/CU MM
LYMPHOCYTES RELATIVE PERCENT: 25 % (ref 24–44)
MCH RBC QN AUTO: 32.7 PG (ref 27–31)
MCHC RBC AUTO-ENTMCNC: 34.8 % (ref 32–36)
MCV RBC AUTO: 94.2 FL (ref 78–100)
MONOCYTES ABSOLUTE: 0.3 K/CU MM
MONOCYTES RELATIVE PERCENT: 5.9 % (ref 0–4)
NEUTROPHILS ABSOLUTE: 3.5 K/CU MM
NEUTROPHILS RELATIVE PERCENT: 67.5 % (ref 36–66)
PDW BLD-RTO: 13.7 % (ref 11.7–14.9)
PLATELET # BLD: 131 K/CU MM (ref 140–440)
PMV BLD AUTO: 8.7 FL (ref 7.5–11.1)
POTASSIUM SERPL-SCNC: 3.8 MMOL/L (ref 3.5–5.1)
RBC # BLD: 3.94 M/CU MM (ref 4.2–5.4)
SED RATE, AUTOMATED: 3 MM/HR (ref 0–30)
SODIUM BLD-SCNC: 136 MMOL/L (ref 135–145)
TOTAL PROTEIN: 5.9 GM/DL (ref 6.4–8.2)
WBC # BLD: 5.1 K/CU MM (ref 4–10.5)

## 2024-07-24 PROCEDURE — 80053 COMPREHEN METABOLIC PANEL: CPT

## 2024-07-24 PROCEDURE — 36591 DRAW BLOOD OFF VENOUS DEVICE: CPT

## 2024-07-24 PROCEDURE — 85025 COMPLETE CBC W/AUTO DIFF WBC: CPT

## 2024-07-24 PROCEDURE — 86320 SERUM IMMUNOELECTROPHORESIS: CPT

## 2024-07-24 PROCEDURE — 82784 ASSAY IGA/IGD/IGG/IGM EACH: CPT

## 2024-07-24 PROCEDURE — 2580000003 HC RX 258: Performed by: INTERNAL MEDICINE

## 2024-07-24 PROCEDURE — 83883 ASSAY NEPHELOMETRY NOT SPEC: CPT

## 2024-07-24 PROCEDURE — 82232 ASSAY OF BETA-2 PROTEIN: CPT

## 2024-07-24 PROCEDURE — 84155 ASSAY OF PROTEIN SERUM: CPT

## 2024-07-24 PROCEDURE — 84165 PROTEIN E-PHORESIS SERUM: CPT

## 2024-07-24 PROCEDURE — 85652 RBC SED RATE AUTOMATED: CPT

## 2024-07-24 PROCEDURE — 83615 LACTATE (LD) (LDH) ENZYME: CPT

## 2024-07-24 RX ORDER — SODIUM CHLORIDE 0.9 % (FLUSH) 0.9 %
20 SYRINGE (ML) INJECTION PRN
Status: DISCONTINUED | OUTPATIENT
Start: 2024-07-24 | End: 2024-07-25 | Stop reason: HOSPADM

## 2024-07-24 RX ORDER — HEPARIN 100 UNIT/ML
500 SYRINGE INTRAVENOUS PRN
OUTPATIENT
Start: 2024-07-24

## 2024-07-24 RX ORDER — WATER 10 ML/10ML
2.2 INJECTION INTRAMUSCULAR; INTRAVENOUS; SUBCUTANEOUS ONCE
OUTPATIENT
Start: 2024-07-24 | End: 2024-07-24

## 2024-07-24 RX ORDER — SODIUM CHLORIDE 0.9 % (FLUSH) 0.9 %
10 SYRINGE (ML) INJECTION PRN
OUTPATIENT
Start: 2024-07-24

## 2024-07-24 RX ORDER — SODIUM CHLORIDE 0.9 % (FLUSH) 0.9 %
20 SYRINGE (ML) INJECTION PRN
OUTPATIENT
Start: 2024-07-24

## 2024-07-24 RX ADMIN — SODIUM CHLORIDE, PRESERVATIVE FREE 20 ML: 5 INJECTION INTRAVENOUS at 14:34

## 2024-07-24 NOTE — PROGRESS NOTES
Ambulated to infusion area. [facility] labs drawn. Mediport flushed per protocol then De- accessed. No complaints voiced. No AVS..states having future appts. Discharged in stable condition.

## 2024-07-25 LAB
ALBUMIN SERPL ELPH-MCNC: 3.6 GM/DL (ref 3.2–5.6)
ALPHA-1-GLOBULIN: 0.2 GM/DL (ref 0.1–0.4)
ALPHA-2-GLOBULIN: 0.6 GM/DL (ref 0.4–1.2)
BETA GLOBULIN: 0.9 GM/DL (ref 0.5–1.3)
GAMMA GLOBULIN: 0.6 GM/DL (ref 0.5–1.6)
SPEP INTERPRETATION: ABNORMAL
SPEP INTERPRETATION: NORMAL
TOTAL PROTEIN: 5.9 GM/DL (ref 6.4–8.2)

## 2024-07-26 LAB — B2 MICROGLOB SERPL-MCNC: 2.5 MG/L

## 2024-07-28 LAB
KAPPA LC FREE SER-MCNC: 26.21 MG/L (ref 3.3–19.4)
KAPPA LC FREE/LAMBDA FREE SER NEPH: 1.95 {RATIO} (ref 0.26–1.65)
LAMBDA LC FREE SERPL-MCNC: 13.46 MG/L (ref 5.71–26.3)

## 2024-07-31 ENCOUNTER — HOSPITAL ENCOUNTER (OUTPATIENT)
Dept: INFUSION THERAPY | Age: 79
Discharge: HOME | End: 2024-07-31
Payer: MEDICARE

## 2024-07-31 ENCOUNTER — OFFICE VISIT (OUTPATIENT)
Dept: ONCOLOGY | Age: 79
End: 2024-07-31
Payer: MEDICARE

## 2024-07-31 VITALS
TEMPERATURE: 97.8 F | OXYGEN SATURATION: 95 % | WEIGHT: 156.6 LBS | DIASTOLIC BLOOD PRESSURE: 67 MMHG | BODY MASS INDEX: 28.82 KG/M2 | HEIGHT: 62 IN | HEART RATE: 69 BPM | SYSTOLIC BLOOD PRESSURE: 155 MMHG

## 2024-07-31 DIAGNOSIS — C83.00 MALIGNANT LYMPHOPLASMACYTIC LYMPHOMA (HCC): Primary | ICD-10-CM

## 2024-07-31 DIAGNOSIS — D05.11 DUCTAL CARCINOMA IN SITU (DCIS) OF RIGHT BREAST: ICD-10-CM

## 2024-07-31 DIAGNOSIS — K86.9 PANCREATIC LESION: ICD-10-CM

## 2024-07-31 PROCEDURE — G8417 CALC BMI ABV UP PARAM F/U: HCPCS | Performed by: INTERNAL MEDICINE

## 2024-07-31 PROCEDURE — 1036F TOBACCO NON-USER: CPT | Performed by: INTERNAL MEDICINE

## 2024-07-31 PROCEDURE — G8427 DOCREV CUR MEDS BY ELIG CLIN: HCPCS | Performed by: INTERNAL MEDICINE

## 2024-07-31 PROCEDURE — 99211 OFF/OP EST MAY X REQ PHY/QHP: CPT

## 2024-07-31 PROCEDURE — 1090F PRES/ABSN URINE INCON ASSESS: CPT | Performed by: INTERNAL MEDICINE

## 2024-07-31 PROCEDURE — 3077F SYST BP >= 140 MM HG: CPT | Performed by: INTERNAL MEDICINE

## 2024-07-31 PROCEDURE — G8399 PT W/DXA RESULTS DOCUMENT: HCPCS | Performed by: INTERNAL MEDICINE

## 2024-07-31 PROCEDURE — 99214 OFFICE O/P EST MOD 30 MIN: CPT | Performed by: INTERNAL MEDICINE

## 2024-07-31 PROCEDURE — 1123F ACP DISCUSS/DSCN MKR DOCD: CPT | Performed by: INTERNAL MEDICINE

## 2024-07-31 PROCEDURE — 3078F DIAST BP <80 MM HG: CPT | Performed by: INTERNAL MEDICINE

## 2024-07-31 RX ORDER — INSULIN ASPART INJECTION 100 [IU]/ML
25 INJECTION, SOLUTION SUBCUTANEOUS 3 TIMES DAILY
COMMUNITY
Start: 2024-07-16

## 2024-07-31 ASSESSMENT — PATIENT HEALTH QUESTIONNAIRE - PHQ9
2. FEELING DOWN, DEPRESSED OR HOPELESS: NOT AT ALL
SUM OF ALL RESPONSES TO PHQ QUESTIONS 1-9: 0
SUM OF ALL RESPONSES TO PHQ9 QUESTIONS 1 & 2: 0
SUM OF ALL RESPONSES TO PHQ QUESTIONS 1-9: 0
1. LITTLE INTEREST OR PLEASURE IN DOING THINGS: NOT AT ALL

## 2024-07-31 NOTE — PROGRESS NOTES
Patient Name: Megan Lomeli  Patient : 1945  Patient MRN: [id number]     Primary Oncologist: Jed Pritchett MD  Referring Provider: Clementina Alexis APRN - NP     Date of Service: 2024      Chief Complaint:   Chief Complaint   Patient presents with    Follow-up     Patient Active Problem List:     Restless leg syndrome     Hypertension     Diabetes mellitus with neuropathy (HCC)     Malignant lymphoplasmacytic lymphoma (HCC)     Osteopenia     Gastroesophageal reflux disease without esophagitis     Insomnia     Osteoporosis    HPI:   Ms. Lomeli is a 78-year-old very pleasant patient with medical history significant for hypertension, diabetes mellitus, uterine cancer status post hysterectomy in , and chronic kidney disease and discoid lupus erythematosus of eyelid, initially referred to me on 2015, for evaluation of monoclonal gammopathy to rule out plasma cell dyscrasia.      Ms. Lomeli stated that she was seen by Dr. Valdivia for proteinuria.  Dr. Valdivia recognized that the patient has elevated gamma globulin on complete metabolic panel and he sent for serum protein electrophoresis and serum free light chain assay.  She was found to have 3.17 grams of monoclonal protein on serum protein electrophoresis.  Her serum Kappa light chain was more than 700 and Kappa Lambda ratio was more than 70.  Because of significant monoclonal protein, she was subsequently referred to me for further evaluation.      Laboratory workups done on 2015, showed 2.4 grams of IgM Kappa monoclonal protein on serum protein electrophoresis and immunofixation.  LDH was 147, serum Kappa light chain was 121 mg/dL and Kappa Lambda ratio was 327.03.  Bone marrow biopsy showed clonal B-cell population detected (26 percent of analyzed cells), CD5 negative, CD10 negative with Kappa light chain restriction.  Plasma cells are essentially absent.  FISH study was negative.      Second opinion from the bone marrow Pathologist at

## 2024-07-31 NOTE — PROGRESS NOTES
MA Rooming Questions  Patient: Megan Lomeli  MRN: [id number]    Date: 7/31/2024        1. Do you have any new issues?   Yes - has back surgery schedule on 8/12     2. Do you need any refills on medications?    no    3. Have you had any imaging done since your last visit?   no    4. Have you been hospitalized or seen in the emergency room since your last visit here?   no    5. Did the patient have a depression screening completed today? Yes    PHQ-9 Total Score: 0 (7/31/2024 10:21 AM)       PHQ-9 Given to (if applicable):               PHQ-9 Score (if applicable):                     [] Positive     []  Negative              Does question #9 need addressed (if applicable)                     [] Yes    []  No               Lupe Escalera CMA

## 2024-10-28 ENCOUNTER — HOSPITAL ENCOUNTER (OUTPATIENT)
Dept: INFUSION THERAPY | Age: 79
Discharge: HOME | End: 2024-10-28
Payer: MEDICARE

## 2024-10-28 DIAGNOSIS — C83.00 MALIGNANT LYMPHOPLASMACYTIC LYMPHOMA (HCC): Primary | ICD-10-CM

## 2024-10-28 PROCEDURE — 2580000003 HC RX 258: Performed by: INTERNAL MEDICINE

## 2024-10-28 PROCEDURE — 96523 IRRIG DRUG DELIVERY DEVICE: CPT

## 2024-10-28 RX ORDER — SODIUM CHLORIDE 0.9 % (FLUSH) 0.9 %
20 SYRINGE (ML) INJECTION PRN
Status: DISCONTINUED | OUTPATIENT
Start: 2024-10-28 | End: 2024-10-29 | Stop reason: HOSPADM

## 2024-10-28 RX ORDER — SODIUM CHLORIDE 0.9 % (FLUSH) 0.9 %
10 SYRINGE (ML) INJECTION PRN
Status: DISCONTINUED | OUTPATIENT
Start: 2024-10-28 | End: 2024-10-29 | Stop reason: HOSPADM

## 2024-10-28 RX ADMIN — SODIUM CHLORIDE, PRESERVATIVE FREE 20 ML: 5 INJECTION INTRAVENOUS at 15:41

## 2024-10-28 RX ADMIN — SODIUM CHLORIDE, PRESERVATIVE FREE 10 ML: 5 INJECTION INTRAVENOUS at 15:41

## 2025-01-24 ENCOUNTER — HOSPITAL ENCOUNTER (OUTPATIENT)
Dept: INFUSION THERAPY | Age: 80
Discharge: HOME | End: 2025-01-24
Payer: MEDICARE

## 2025-01-24 DIAGNOSIS — R53.83 FATIGUE, UNSPECIFIED TYPE: ICD-10-CM

## 2025-01-24 DIAGNOSIS — K86.9 PANCREATIC LESION: ICD-10-CM

## 2025-01-24 DIAGNOSIS — D05.11 DUCTAL CARCINOMA IN SITU (DCIS) OF RIGHT BREAST: ICD-10-CM

## 2025-01-24 DIAGNOSIS — C83.00 MALIGNANT LYMPHOPLASMACYTIC LYMPHOMA (HCC): Primary | ICD-10-CM

## 2025-01-24 LAB
ALBUMIN SERPL-MCNC: 4 G/DL (ref 3.4–5)
ALBUMIN/GLOB SERPL: 2.3 {RATIO} (ref 1.1–2.2)
ALP SERPL-CCNC: 51 U/L (ref 40–129)
ALT SERPL-CCNC: 18 U/L (ref 10–40)
ANION GAP SERPL CALCULATED.3IONS-SCNC: 10 MMOL/L (ref 9–17)
AST SERPL-CCNC: 20 U/L (ref 15–37)
BASOPHILS # BLD: 0.02 K/UL
BASOPHILS NFR BLD: 0 % (ref 0–1)
BILIRUB SERPL-MCNC: 0.4 MG/DL (ref 0–1)
BUN SERPL-MCNC: 23 MG/DL (ref 7–20)
CALCIUM SERPL-MCNC: 9.3 MG/DL (ref 8.3–10.6)
CHLORIDE SERPL-SCNC: 98 MMOL/L (ref 99–110)
CO2 SERPL-SCNC: 25 MMOL/L (ref 21–32)
CREAT SERPL-MCNC: 1 MG/DL (ref 0.6–1.2)
EOSINOPHIL # BLD: 0.08 K/UL
EOSINOPHILS RELATIVE PERCENT: 2 % (ref 0–3)
ERYTHROCYTE [DISTWIDTH] IN BLOOD BY AUTOMATED COUNT: 14.2 % (ref 11.7–14.9)
ERYTHROCYTE [SEDIMENTATION RATE] IN BLOOD BY WESTERGREN METHOD: 1 MM/HR (ref 0–30)
FERRITIN SERPL-MCNC: 100 NG/ML (ref 15–150)
FOLATE SERPL-MCNC: 25.9 NG/ML (ref 4.8–24.2)
GFR, ESTIMATED: 56 ML/MIN/1.73M2
GLUCOSE SERPL-MCNC: 190 MG/DL (ref 74–99)
HCT VFR BLD AUTO: 34.7 % (ref 37–47)
HGB BLD-MCNC: 11.9 G/DL (ref 12.5–16)
IGA SERPL-MCNC: 284 MG/DL (ref 70–400)
IGG SERPL-MCNC: 447 MG/DL (ref 700–1600)
IGM SERPL-MCNC: 210 MG/DL (ref 40–230)
IRON SATN MFR SERPL: 21 % (ref 15–50)
IRON SERPL-MCNC: 59 UG/DL (ref 37–145)
LDH SERPL-CCNC: 193 U/L (ref 100–190)
LYMPHOCYTES NFR BLD: 1.25 K/UL
LYMPHOCYTES RELATIVE PERCENT: 28 % (ref 24–44)
MCH RBC QN AUTO: 32.9 PG (ref 27–31)
MCHC RBC AUTO-ENTMCNC: 34.3 G/DL (ref 32–36)
MCV RBC AUTO: 95.9 FL (ref 78–100)
MONOCYTES NFR BLD: 0.32 K/UL
MONOCYTES NFR BLD: 7 % (ref 0–4)
NEUTROPHILS NFR BLD: 63 % (ref 36–66)
NEUTS SEG NFR BLD: 2.8 K/UL
PLATELET # BLD AUTO: 147 K/UL (ref 140–440)
PMV BLD AUTO: 8.4 FL (ref 7.5–11.1)
POTASSIUM SERPL-SCNC: 3.9 MMOL/L (ref 3.5–5.1)
PROT SERPL-MCNC: 5.7 G/DL (ref 6.4–8.2)
RBC # BLD AUTO: 3.62 M/UL (ref 4.2–5.4)
SODIUM SERPL-SCNC: 133 MMOL/L (ref 136–145)
TIBC SERPL-MCNC: 279 UG/DL (ref 260–445)
TSH SERPL DL<=0.05 MIU/L-ACNC: 1.26 UIU/ML (ref 0.27–4.2)
UNSATURATED IRON BINDING CAPACITY: 220 UG/DL (ref 110–370)
VIT B12 SERPL-MCNC: 590 PG/ML (ref 211–911)
WBC OTHER # BLD: 4.5 K/UL (ref 4–10.5)

## 2025-01-24 PROCEDURE — 82607 VITAMIN B-12: CPT

## 2025-01-24 PROCEDURE — 83615 LACTATE (LD) (LDH) ENZYME: CPT

## 2025-01-24 PROCEDURE — 83540 ASSAY OF IRON: CPT

## 2025-01-24 PROCEDURE — 83521 IG LIGHT CHAINS FREE EACH: CPT

## 2025-01-24 PROCEDURE — 85025 COMPLETE CBC W/AUTO DIFF WBC: CPT

## 2025-01-24 PROCEDURE — 84165 PROTEIN E-PHORESIS SERUM: CPT

## 2025-01-24 PROCEDURE — 86334 IMMUNOFIX E-PHORESIS SERUM: CPT

## 2025-01-24 PROCEDURE — 84155 ASSAY OF PROTEIN SERUM: CPT

## 2025-01-24 PROCEDURE — 36591 DRAW BLOOD OFF VENOUS DEVICE: CPT

## 2025-01-24 PROCEDURE — 83550 IRON BINDING TEST: CPT

## 2025-01-24 PROCEDURE — 82746 ASSAY OF FOLIC ACID SERUM: CPT

## 2025-01-24 PROCEDURE — 85652 RBC SED RATE AUTOMATED: CPT

## 2025-01-24 PROCEDURE — 82728 ASSAY OF FERRITIN: CPT

## 2025-01-24 PROCEDURE — 80053 COMPREHEN METABOLIC PANEL: CPT

## 2025-01-24 PROCEDURE — 84443 ASSAY THYROID STIM HORMONE: CPT

## 2025-01-24 PROCEDURE — 2500000003 HC RX 250 WO HCPCS: Performed by: INTERNAL MEDICINE

## 2025-01-24 PROCEDURE — 82784 ASSAY IGA/IGD/IGG/IGM EACH: CPT

## 2025-01-24 RX ORDER — HEPARIN 100 UNIT/ML
500 SYRINGE INTRAVENOUS PRN
Status: CANCELLED | OUTPATIENT
Start: 2025-01-24

## 2025-01-24 RX ORDER — SODIUM CHLORIDE 0.9 % (FLUSH) 0.9 %
20 SYRINGE (ML) INJECTION PRN
Status: DISCONTINUED | OUTPATIENT
Start: 2025-01-24 | End: 2025-01-25 | Stop reason: HOSPADM

## 2025-01-24 RX ORDER — HEPARIN 100 UNIT/ML
500 SYRINGE INTRAVENOUS PRN
OUTPATIENT
Start: 2025-01-24

## 2025-01-24 RX ORDER — SODIUM CHLORIDE 0.9 % (FLUSH) 0.9 %
10 SYRINGE (ML) INJECTION PRN
Status: CANCELLED | OUTPATIENT
Start: 2025-01-24

## 2025-01-24 RX ORDER — SODIUM CHLORIDE 0.9 % (FLUSH) 0.9 %
10 SYRINGE (ML) INJECTION PRN
OUTPATIENT
Start: 2025-01-24

## 2025-01-24 RX ORDER — SODIUM CHLORIDE 0.9 % (FLUSH) 0.9 %
20 SYRINGE (ML) INJECTION PRN
OUTPATIENT
Start: 2025-01-24

## 2025-01-24 RX ORDER — WATER 10 ML/10ML
2.2 INJECTION INTRAMUSCULAR; INTRAVENOUS; SUBCUTANEOUS ONCE
OUTPATIENT
Start: 2025-01-24 | End: 2025-01-24

## 2025-01-24 RX ORDER — WATER 10 ML/10ML
2.2 INJECTION INTRAMUSCULAR; INTRAVENOUS; SUBCUTANEOUS ONCE
Status: CANCELLED | OUTPATIENT
Start: 2025-01-24 | End: 2025-01-24

## 2025-01-24 RX ADMIN — SODIUM CHLORIDE, PRESERVATIVE FREE 20 ML: 5 INJECTION INTRAVENOUS at 14:20

## 2025-01-24 NOTE — PROGRESS NOTES
Patient arrived to treatment suite for port draw. Patient reports feeling very tired, more than usual. Discussed with Dr. Pritchett, will draw additional labs: Iron studies, ferritin, B12, folate, and TSH. Patient updated and verbalized understanding. Right chest mediport accessed and flushed with normal saline, good blood return noted.  Flushed and saline locked, band-aid applied.  Patient tolerated well.  Left treatment suite ambulatory. RTC 01/31 for OV with Dr. Pritchett.

## 2025-01-27 LAB — BETA-2 MICROGLOBULIN: 2.5 MG/L

## 2025-01-28 LAB
COMMENT: ABNORMAL
KAPPA FREE LIGHT CHAIN: 30.2 MG/L (ref 2.37–20.73)
KAPPA/LAMBDA RATIO: 1.96 (ref 0.22–1.74)
LAMBDA FREE LIGHT CHAIN: 15.4 MG/L (ref 4.23–27.69)

## 2025-01-29 LAB
MISCELLANEOUS LAB TEST RESULT: ABNORMAL
TEST NAME: ABNORMAL

## 2025-01-30 LAB
MISCELLANEOUS LAB TEST RESULT: NORMAL
TEST NAME: NORMAL

## 2025-01-30 NOTE — PROGRESS NOTES
Patient Name: Megan Lomeli  Patient : 1945  Patient MRN: [id number]     Primary Oncologist: Jed Pritchett MD  Referring Provider: Clementina Alexis APRN - NP     Date of Service: 2025      Chief Complaint:   Chief Complaint   Patient presents with    Follow-up     Patient Active Problem List:     Restless leg syndrome     Hypertension     Diabetes mellitus with neuropathy (HCC)     Malignant lymphoplasmacytic lymphoma (HCC)     Osteopenia     Gastroesophageal reflux disease without esophagitis     Insomnia     Osteoporosis    HPI:   Ms. Lomeli is a 79-year-old very pleasant patient with medical history significant for hypertension, diabetes mellitus, uterine cancer status post hysterectomy in , and chronic kidney disease and discoid lupus erythematosus of eyelid, initially referred to me on 2015, for evaluation of monoclonal gammopathy to rule out plasma cell dyscrasia.      Ms. Lomeli stated that she was seen by Dr. Valdivia for proteinuria.  Dr. Valdivia recognized that the patient has elevated gamma globulin on complete metabolic panel and he sent for serum protein electrophoresis and serum free light chain assay.  She was found to have 3.17 grams of monoclonal protein on serum protein electrophoresis.  Her serum Kappa light chain was more than 700 and Kappa Lambda ratio was more than 70.  Because of significant monoclonal protein, she was subsequently referred to me for further evaluation.      Laboratory workups done on 2015, showed 2.4 grams of IgM Kappa monoclonal protein on serum protein electrophoresis and immunofixation.  LDH was 147, serum Kappa light chain was 121 mg/dL and Kappa Lambda ratio was 327.03.  Bone marrow biopsy showed clonal B-cell population detected (26 percent of analyzed cells), CD5 negative, CD10 negative with Kappa light chain restriction.  Plasma cells are essentially absent.  FISH study was negative.      Second opinion from the bone marrow Pathologist at

## 2025-01-31 ENCOUNTER — OFFICE VISIT (OUTPATIENT)
Dept: ONCOLOGY | Age: 80
End: 2025-01-31
Payer: MEDICARE

## 2025-01-31 ENCOUNTER — HOSPITAL ENCOUNTER (OUTPATIENT)
Dept: INFUSION THERAPY | Age: 80
Discharge: HOME | End: 2025-01-31
Payer: MEDICARE

## 2025-01-31 VITALS
WEIGHT: 156.4 LBS | OXYGEN SATURATION: 99 % | TEMPERATURE: 97.3 F | BODY MASS INDEX: 28.78 KG/M2 | HEART RATE: 65 BPM | RESPIRATION RATE: 16 BRPM | DIASTOLIC BLOOD PRESSURE: 62 MMHG | HEIGHT: 62 IN | SYSTOLIC BLOOD PRESSURE: 146 MMHG

## 2025-01-31 DIAGNOSIS — C83.00 MALIGNANT LYMPHOPLASMACYTIC LYMPHOMA (HCC): Primary | ICD-10-CM

## 2025-01-31 DIAGNOSIS — D05.11 DUCTAL CARCINOMA IN SITU (DCIS) OF RIGHT BREAST: ICD-10-CM

## 2025-01-31 PROCEDURE — 3078F DIAST BP <80 MM HG: CPT | Performed by: INTERNAL MEDICINE

## 2025-01-31 PROCEDURE — G8417 CALC BMI ABV UP PARAM F/U: HCPCS | Performed by: INTERNAL MEDICINE

## 2025-01-31 PROCEDURE — 1090F PRES/ABSN URINE INCON ASSESS: CPT | Performed by: INTERNAL MEDICINE

## 2025-01-31 PROCEDURE — 1159F MED LIST DOCD IN RCRD: CPT | Performed by: INTERNAL MEDICINE

## 2025-01-31 PROCEDURE — 99212 OFFICE O/P EST SF 10 MIN: CPT

## 2025-01-31 PROCEDURE — 1036F TOBACCO NON-USER: CPT | Performed by: INTERNAL MEDICINE

## 2025-01-31 PROCEDURE — 99214 OFFICE O/P EST MOD 30 MIN: CPT | Performed by: INTERNAL MEDICINE

## 2025-01-31 PROCEDURE — 3077F SYST BP >= 140 MM HG: CPT | Performed by: INTERNAL MEDICINE

## 2025-01-31 PROCEDURE — 1126F AMNT PAIN NOTED NONE PRSNT: CPT | Performed by: INTERNAL MEDICINE

## 2025-01-31 PROCEDURE — 99211 OFF/OP EST MAY X REQ PHY/QHP: CPT

## 2025-01-31 PROCEDURE — G8399 PT W/DXA RESULTS DOCUMENT: HCPCS | Performed by: INTERNAL MEDICINE

## 2025-01-31 PROCEDURE — 1123F ACP DISCUSS/DSCN MKR DOCD: CPT | Performed by: INTERNAL MEDICINE

## 2025-01-31 PROCEDURE — G8427 DOCREV CUR MEDS BY ELIG CLIN: HCPCS | Performed by: INTERNAL MEDICINE

## 2025-01-31 RX ORDER — CETIRIZINE HYDROCHLORIDE 10 MG/1
10 TABLET ORAL DAILY
COMMUNITY

## 2025-01-31 RX ORDER — CLOPIDOGREL BISULFATE 75 MG/1
1 TABLET ORAL DAILY
COMMUNITY
Start: 2025-01-29

## 2025-01-31 ASSESSMENT — PATIENT HEALTH QUESTIONNAIRE - PHQ9
SUM OF ALL RESPONSES TO PHQ QUESTIONS 1-9: 0
2. FEELING DOWN, DEPRESSED OR HOPELESS: NOT AT ALL
1. LITTLE INTEREST OR PLEASURE IN DOING THINGS: NOT AT ALL
SUM OF ALL RESPONSES TO PHQ QUESTIONS 1-9: 0
SUM OF ALL RESPONSES TO PHQ QUESTIONS 1-9: 0
SUM OF ALL RESPONSES TO PHQ9 QUESTIONS 1 & 2: 0
SUM OF ALL RESPONSES TO PHQ QUESTIONS 1-9: 0

## 2025-01-31 NOTE — PROGRESS NOTES
MA Rooming Questions  Patient: Megan Lomeli  MRN: [id number]    Date: 1/31/2025        1. Do you have any new issues?   no         2. Do you need any refills on medications?    no    3. Have you had any imaging done since your last visit?   yes - at [facility] prior to a leg procedure    4. Have you been hospitalized or seen in the emergency room since your last visit here?   yes - [facility] for leg procedure    5. Did the patient have a depression screening completed today? Yes    No data recorded     PHQ-9 Given to (if applicable):               PHQ-9 Score (if applicable):                     [] Positive     []  Negative              Does question #9 need addressed (if applicable)                     [] Yes    []  No               Chacha Bhandari MA

## 2025-02-28 ENCOUNTER — TRANSCRIBE ORDERS (OUTPATIENT)
Dept: ADMINISTRATIVE | Age: 80
End: 2025-02-28

## 2025-02-28 DIAGNOSIS — N39.0 URINARY TRACT INFECTION WITHOUT HEMATURIA, SITE UNSPECIFIED: Primary | ICD-10-CM

## 2025-03-05 ENCOUNTER — HOSPITAL ENCOUNTER (OUTPATIENT)
Dept: ULTRASOUND IMAGING | Age: 80
Discharge: HOME | End: 2025-03-05
Attending: SPECIALIST
Payer: MEDICARE

## 2025-03-05 DIAGNOSIS — N39.0 URINARY TRACT INFECTION WITHOUT HEMATURIA, SITE UNSPECIFIED: ICD-10-CM

## 2025-03-05 PROCEDURE — 76770 US EXAM ABDO BACK WALL COMP: CPT

## 2025-04-24 ENCOUNTER — HOSPITAL ENCOUNTER (OUTPATIENT)
Dept: INFUSION THERAPY | Age: 80
Discharge: HOME | End: 2025-04-24
Payer: MEDICARE

## 2025-04-24 DIAGNOSIS — C83.00 MALIGNANT LYMPHOPLASMACYTIC LYMPHOMA (HCC): Primary | ICD-10-CM

## 2025-04-24 PROCEDURE — 2500000003 HC RX 250 WO HCPCS: Performed by: INTERNAL MEDICINE

## 2025-04-24 PROCEDURE — 96523 IRRIG DRUG DELIVERY DEVICE: CPT

## 2025-04-24 RX ORDER — SODIUM CHLORIDE 0.9 % (FLUSH) 0.9 %
20 SYRINGE (ML) INJECTION PRN
OUTPATIENT
Start: 2025-04-24

## 2025-04-24 RX ORDER — WATER 10 ML/10ML
2.2 INJECTION INTRAMUSCULAR; INTRAVENOUS; SUBCUTANEOUS ONCE
OUTPATIENT
Start: 2025-04-24 | End: 2025-04-24

## 2025-04-24 RX ORDER — SODIUM CHLORIDE 0.9 % (FLUSH) 0.9 %
10 SYRINGE (ML) INJECTION PRN
OUTPATIENT
Start: 2025-04-24

## 2025-04-24 RX ORDER — SODIUM CHLORIDE 0.9 % (FLUSH) 0.9 %
10 SYRINGE (ML) INJECTION PRN
Status: DISCONTINUED | OUTPATIENT
Start: 2025-04-24 | End: 2025-04-25 | Stop reason: HOSPADM

## 2025-04-24 RX ORDER — HEPARIN 100 UNIT/ML
500 SYRINGE INTRAVENOUS PRN
OUTPATIENT
Start: 2025-04-24

## 2025-04-24 RX ORDER — SODIUM CHLORIDE 0.9 % (FLUSH) 0.9 %
20 SYRINGE (ML) INJECTION PRN
Status: DISCONTINUED | OUTPATIENT
Start: 2025-04-24 | End: 2025-04-25 | Stop reason: HOSPADM

## 2025-04-24 RX ADMIN — SODIUM CHLORIDE, PRESERVATIVE FREE 10 ML: 5 INJECTION INTRAVENOUS at 13:46

## 2025-04-24 RX ADMIN — SODIUM CHLORIDE, PRESERVATIVE FREE 20 ML: 5 INJECTION INTRAVENOUS at 13:47

## 2025-04-24 NOTE — PROGRESS NOTES
Patient arrived to treatment suite for port flush.  Right chest mediport accessed and flushed with normal saline, good blood return noted.  Flushed and saline locked, band-aid applied.  Patient tolerated well.  Left treatment suite ambulatory.  Discharge instructions declined

## 2025-05-16 RX ORDER — FUROSEMIDE 20 MG/1
TABLET ORAL
COMMUNITY
Start: 2025-02-20

## 2025-05-16 RX ORDER — SULFAMETHOXAZOLE AND TRIMETHOPRIM 200; 40 MG/5ML; MG/5ML
160 SUSPENSION ORAL
Status: ON HOLD | COMMUNITY
End: 2025-05-22

## 2025-05-16 NOTE — PROGRESS NOTES
Surgery @ [facility] on 5/22/25 at at 1030, arrival at 0830.    NOTHING TO EAT OR DRINK AFTER MIDNIGHT DAY OF SURGERY    1. Enter thru the hospital main entrance on day of surgery, check in at the Information Desk. If you arrive prior to 6:00am, enter thru the ER entrance.    2. Follow the directions as prescribed by the doctor for your procedure and medications.         Morning of surgery take: Gabapentin with a sip of water.      Tresiba morning of take 48 units of 80% of typical dose.    Plavix- Patient is going to call cardiologist about when she needs to stop.         Stop vitamins, supplements and NSAIDS:  NOW (Tylenol is ok)     3. Check with your Doctor regarding stopping blood thinners and follow their instructions.    4. Do not smoke, vape or use chewing tobacco morning of surgery. Do not drink any alcoholic beverages 24 hours prior to surgery.       This includes NA Beer. No street drugs 7 days prior to surgery.    5. If you have dentures, contacts of glasses they will be removed before going to the OR; please bring a case.    6. Please bring picture ID, insurance card, paperwork from the doctor’s office (H & P, Consent, & card for implantable devices).    7. Take a shower with an antibacterial soap the night before surgery and the morning of surgery. Do not put anything on your skin      After your morning shower.    8. You will need a responsible adult to drive you home and check on you after surgery.    pre op instructions

## 2025-05-20 ENCOUNTER — ANESTHESIA EVENT (OUTPATIENT)
Dept: OPERATING ROOM | Age: 80
End: 2025-05-20
Payer: MEDICARE

## 2025-05-20 NOTE — PROGRESS NOTES
5/20/25 - Updated patient on surgery time change at [facility] for 5/22/25. Surgery is now at 0930, arrival 0730. Megan verbalized understanding.

## 2025-05-22 ENCOUNTER — HOSPITAL ENCOUNTER (OUTPATIENT)
Age: 80
Setting detail: OUTPATIENT SURGERY
Discharge: HOME | End: 2025-05-22
Attending: ORTHOPAEDIC SURGERY | Admitting: ORTHOPAEDIC SURGERY
Payer: MEDICARE

## 2025-05-22 ENCOUNTER — ANESTHESIA (OUTPATIENT)
Dept: OPERATING ROOM | Age: 80
End: 2025-05-22
Payer: MEDICARE

## 2025-05-22 VITALS
SYSTOLIC BLOOD PRESSURE: 129 MMHG | DIASTOLIC BLOOD PRESSURE: 60 MMHG | OXYGEN SATURATION: 98 % | RESPIRATION RATE: 16 BRPM | HEIGHT: 62 IN | BODY MASS INDEX: 28.34 KG/M2 | HEART RATE: 67 BPM | WEIGHT: 154 LBS | TEMPERATURE: 97.8 F

## 2025-05-22 DIAGNOSIS — R22.32: ICD-10-CM

## 2025-05-22 LAB — GLUCOSE BLD-MCNC: 135 MG/DL (ref 74–99)

## 2025-05-22 PROCEDURE — 3600000012 HC SURGERY LEVEL 2 ADDTL 15MIN: Performed by: ORTHOPAEDIC SURGERY

## 2025-05-22 PROCEDURE — 7100000010 HC PHASE II RECOVERY - FIRST 15 MIN: Performed by: ORTHOPAEDIC SURGERY

## 2025-05-22 PROCEDURE — 2580000003 HC RX 258: Performed by: ORTHOPAEDIC SURGERY

## 2025-05-22 PROCEDURE — 6360000002 HC RX W HCPCS

## 2025-05-22 PROCEDURE — 2500000003 HC RX 250 WO HCPCS: Performed by: ORTHOPAEDIC SURGERY

## 2025-05-22 PROCEDURE — 6360000002 HC RX W HCPCS: Performed by: ORTHOPAEDIC SURGERY

## 2025-05-22 PROCEDURE — 88305 TISSUE EXAM BY PATHOLOGIST: CPT

## 2025-05-22 PROCEDURE — 2709999900 HC NON-CHARGEABLE SUPPLY: Performed by: ORTHOPAEDIC SURGERY

## 2025-05-22 PROCEDURE — 3600000002 HC SURGERY LEVEL 2 BASE: Performed by: ORTHOPAEDIC SURGERY

## 2025-05-22 PROCEDURE — 82962 GLUCOSE BLOOD TEST: CPT

## 2025-05-22 PROCEDURE — 3700000001 HC ADD 15 MINUTES (ANESTHESIA): Performed by: ORTHOPAEDIC SURGERY

## 2025-05-22 PROCEDURE — 7100000011 HC PHASE II RECOVERY - ADDTL 15 MIN: Performed by: ORTHOPAEDIC SURGERY

## 2025-05-22 PROCEDURE — 3700000000 HC ANESTHESIA ATTENDED CARE: Performed by: ORTHOPAEDIC SURGERY

## 2025-05-22 RX ORDER — FENTANYL CITRATE 50 UG/ML
INJECTION, SOLUTION INTRAMUSCULAR; INTRAVENOUS
Status: DISCONTINUED | OUTPATIENT
Start: 2025-05-22 | End: 2025-05-22 | Stop reason: SDUPTHER

## 2025-05-22 RX ORDER — SODIUM CHLORIDE, SODIUM LACTATE, POTASSIUM CHLORIDE, CALCIUM CHLORIDE 600; 310; 30; 20 MG/100ML; MG/100ML; MG/100ML; MG/100ML
1000 INJECTION, SOLUTION INTRAVENOUS CONTINUOUS
Status: DISCONTINUED | OUTPATIENT
Start: 2025-05-22 | End: 2025-05-22 | Stop reason: HOSPADM

## 2025-05-22 RX ORDER — ROSUVASTATIN CALCIUM 20 MG/1
20 TABLET, COATED ORAL DAILY
COMMUNITY
Start: 2025-04-14

## 2025-05-22 RX ORDER — METOPROLOL SUCCINATE 25 MG/1
25 TABLET, EXTENDED RELEASE ORAL DAILY
COMMUNITY
Start: 2025-05-10

## 2025-05-22 RX ORDER — PROPOFOL 10 MG/ML
INJECTION, EMULSION INTRAVENOUS
Status: DISCONTINUED | OUTPATIENT
Start: 2025-05-22 | End: 2025-05-22 | Stop reason: SDUPTHER

## 2025-05-22 RX ORDER — LIDOCAINE HYDROCHLORIDE 10 MG/ML
INJECTION, SOLUTION EPIDURAL; INFILTRATION; INTRACAUDAL; PERINEURAL
Status: DISCONTINUED | OUTPATIENT
Start: 2025-05-22 | End: 2025-05-22 | Stop reason: ALTCHOICE

## 2025-05-22 RX ORDER — PHENYLEPHRINE HCL IN 0.9% NACL 1 MG/10 ML
SYRINGE (ML) INTRAVENOUS
Status: DISCONTINUED | OUTPATIENT
Start: 2025-05-22 | End: 2025-05-22 | Stop reason: SDUPTHER

## 2025-05-22 RX ORDER — OMEPRAZOLE 20 MG/1
1 CAPSULE, DELAYED RELEASE ORAL DAILY
COMMUNITY

## 2025-05-22 RX ORDER — BUPIVACAINE HYDROCHLORIDE 5 MG/ML
INJECTION, SOLUTION PERINEURAL
Status: DISCONTINUED | OUTPATIENT
Start: 2025-05-22 | End: 2025-05-22 | Stop reason: ALTCHOICE

## 2025-05-22 RX ORDER — SULFAMETHOXAZOLE AND TRIMETHOPRIM 400; 80 MG/1; MG/1
1 TABLET ORAL
Status: ON HOLD | COMMUNITY
Start: 2025-03-21 | End: 2025-05-22 | Stop reason: HOSPADM

## 2025-05-22 RX ORDER — HEPARIN 100 UNIT/ML
500 SYRINGE INTRAVENOUS PRN
Status: DISCONTINUED | OUTPATIENT
Start: 2025-05-22 | End: 2025-05-22 | Stop reason: HOSPADM

## 2025-05-22 RX ADMIN — FENTANYL CITRATE 50 MCG: 50 INJECTION INTRAMUSCULAR; INTRAVENOUS at 11:04

## 2025-05-22 RX ADMIN — Medication 0.1 MCG: at 11:27

## 2025-05-22 RX ADMIN — Medication 0.1 MCG: at 11:31

## 2025-05-22 RX ADMIN — PROPOFOL 40 MG: 10 INJECTION, EMULSION INTRAVENOUS at 11:05

## 2025-05-22 RX ADMIN — PROPOFOL 50 MCG/KG/MIN: 10 INJECTION, EMULSION INTRAVENOUS at 11:06

## 2025-05-22 RX ADMIN — SODIUM CHLORIDE, SODIUM LACTATE, POTASSIUM CHLORIDE, AND CALCIUM CHLORIDE 1000 ML: .6; .31; .03; .02 INJECTION, SOLUTION INTRAVENOUS at 08:25

## 2025-05-22 RX ADMIN — Medication 0.1 MCG: at 11:19

## 2025-05-22 RX ADMIN — CEFAZOLIN 2000 MG: 2 INJECTION, POWDER, FOR SOLUTION INTRAMUSCULAR; INTRAVENOUS at 11:09

## 2025-05-22 RX ADMIN — Medication 500 UNITS: at 12:43

## 2025-05-22 ASSESSMENT — PAIN SCALES - GENERAL
PAINLEVEL_OUTOF10: 0

## 2025-05-22 NOTE — PROGRESS NOTES
Pt. Awake, alert, and oriented x 4. On room air, vs stable. Pt. Denies pain or nausea. Clean, dry, dressing to left thumb. No drainage noted. IV infusing without difficulty. Tony crackers and ice water given per pt. Request. Call light in reach.  at bedside. Will continue to monitor.

## 2025-05-22 NOTE — DISCHARGE INSTRUCTIONS
DR RIVERA'S INSTRUCTIONS     Keep dressing clean, dry and intact until seen in follow-up or by therapy.    Elevate operative hand above heart level for 48-72 hours and then as needed for swelling.      May wiggle/move/bend/straighten fingers & thumb (unless splinted) as tolerated     No lifting > 5 lbs. With operative hand.     May shower with umbrella bag over dressing and arm.      Return to clinic or go to Emergency department  for increasing/uncontrolled pain, fevers > 101.5 degrees fahrenheit, chills, pus, redness, nausea, or vomiting.     [facility] in [place]  [phone] (Same Day Surgery)    Do not drive, work around machines or use equipment.  Do not drink any alcoholic beverages.  Do not smoke while alone.  Avoid making important decisions.  Plan to spend a quiet, relaxed evening @ home.  Resume normal activities as you begin to feel better.  Eat lightly for your first meal, then gradually increase your diet to what is normal for you.  In case of nausea, avoid food and drink only clear liquids.  Resume food as nausea ceases.  Notify your surgeon if you experience fever, chills, large amount of bleeding, difficulty breathing, persistent nausea and vomiting or any other disturbing problem.  Call for a follow-up appointment with your surgeon.

## 2025-05-22 NOTE — H&P
Megan Lomeli Date/Time of Admission: 2025  7:15 AM    CSN: [id number];MRN: [id number]  Attending Provider: Maciel Govea MD   Room/Bed: OR/NONE : 1945 Age: 79 y.o.     H&P Interval Note    Procedure(s) to be performed Excision left thumb mass    Indication(s) for procedure Unknown mass    I have reviewed the History and Physical and/or relevant Consult on this patient. The patient was examined, and I concur with the findings of the History and Physical performed.    The following changes are present: None    The risks, benefits, and alternative treatments discussed with the patient and/or family.    Risk of exposure to and contraction of Covid-19 while patient is in the hospital was reviewed with the patient. Also, risk of Covid-19 infection slowing recovery from the procedure was reviewed.  Patient understood and wishes to proceed.    Electronically signed by: Maciel Govea MD 2025 7:43 AM

## 2025-05-22 NOTE — ANESTHESIA PRE PROCEDURE
daily  Patient taking differently: Inject 60 Units into the skin every morning 5/29/18   Ilana Whittaker MD   alendronate (FOSAMAX) 70 MG tablet Take 1 tablet by mouth every 7 days  Patient taking differently: Take 1 tablet by mouth every 7 days Takes on Fridays 5/29/18   Ilana Whittaker MD   rOPINIRole (REQUIP) 2 MG tablet Take 1 tablet by mouth 2 times daily  Patient taking differently: Take 1 tablet by mouth See Admin Instructions Takes at dinner and HS 5/29/18   Ilana Whittaker MD   Glucose Blood (BLOOD GLUCOSE TEST STRIPS) STRP To check blood sugar 4x daily with current Glucometer:   DX: diabetes type .00 5/29/18   Ilana Whittaker MD   pimecrolimus (ELIDEL) 1 % cream Apply topically 2 times daily Apply topically 2 times daily.  Patient not taking: Reported on 1/31/2025 11/30/17   Ilana Whittaker MD   Blood Glucose Monitoring Suppl DOMINIQUE One device 5/26/17   Ilana Whittaker MD   Lancets MISC Use 1 lancet 3 times daily 5/26/17   Ilana Whittaker MD   cycloSPORINE (RESTASIS) 0.05 % ophthalmic emulsion Place 1 drop into both eyes 2 times daily    Provider, MD Debby   calcium carbonate (OSCAL) 500 MG TABS tablet Take 1 tablet by mouth daily 5/12/16   Juan Carlos Segura PARanjanC       Current medications:    No current facility-administered medications for this encounter.     Current Outpatient Medications   Medication Sig Dispense Refill    furosemide (LASIX) 20 MG tablet TAKE 1 TABLET (20 MG TOTAL) BY MOUTH DAILY AS NEEDED      INSULIN ASPART SC Inject 15 Units into the skin 3 times daily (with meals)      sulfamethoxazole-trimethoprim (BACTRIM;SEPTRA) 200-40 MG/5ML suspension Take 20 mLs by mouth three times a week      clopidogrel (PLAVIX) 75 MG tablet Take 1 tablet by mouth daily      cetirizine (ZYRTEC) 10 MG tablet Take 1 tablet by mouth daily      gabapentin (NEURONTIN) 400 MG capsule       Calcium Carbonate-Vitamin D (CALCIUM-VITAMIN D) 600-125 MG-UNIT TABS Take by mouth      
   • TUBAL LIGATION         Social History:    Social History     Tobacco Use   • Smoking status: Former     Current packs/day: 1.00     Average packs/day: 1 pack/day for 59.5 years (59.5 ttl pk-yrs)     Types: Cigarettes     Start date: 11/9/1965   • Smokeless tobacco: Never   Substance Use Topics   • Alcohol use: No     Alcohol/week: 0.0 standard drinks of alcohol                                Counseling given: Not Answered      Vital Signs (Current):   Vitals:    05/16/25 1203 05/22/25 0744   BP:  (!) 147/70   Pulse:  63   Resp:  16   Temp:  36.4 °C (97.6 °F)   TempSrc:  Temporal   SpO2:  96%   Weight: 69.9 kg (154 lb)    Height: 1.575 m (5' 2\")                                               BP Readings from Last 3 Encounters:   05/22/25 (!) 147/70   01/31/25 (!) 146/62   07/31/24 (!) 155/67       NPO Status: Time of last liquid consumption: 1800                        Time of last solid consumption: 1800                        Date of last liquid consumption: 05/21/25                        Date of last solid food consumption: 05/21/25    BMI:   Wt Readings from Last 3 Encounters:   05/16/25 69.9 kg (154 lb)   01/31/25 70.9 kg (156 lb 6.4 oz)   07/31/24 71 kg (156 lb 9.6 oz)     Body mass index is 28.17 kg/m².    CBC:   Lab Results   Component Value Date/Time    WBC 4.5 01/24/2025 02:14 PM    RBC 3.62 01/24/2025 02:14 PM    HGB 11.9 01/24/2025 02:14 PM    HCT 34.7 01/24/2025 02:14 PM    MCV 95.9 01/24/2025 02:14 PM    RDW 14.2 01/24/2025 02:14 PM     01/24/2025 02:14 PM       CMP:   Lab Results   Component Value Date/Time     01/24/2025 02:14 PM    K 3.9 01/24/2025 02:14 PM    CL 98 01/24/2025 02:14 PM    CO2 25 01/24/2025 02:14 PM    BUN 23 01/24/2025 02:14 PM    CREATININE 1.0 01/24/2025 02:14 PM    GFRAA >60 09/21/2022 02:00 PM    AGRATIO 1.9 08/16/2016 08:50 AM    LABGLOM 56 01/24/2025 02:14 PM    LABGLOM >60 03/20/2024 03:04 PM    GLUCOSE 190 01/24/2025 02:14 PM    CALCIUM 9.3 01/24/2025 02:14

## 2025-05-22 NOTE — OP NOTE
ORTHOPEDIC OPERATIVE NOTE     Name: Megan Lomeli  MRN: [id number]   : 1945  CSN:  [id number]   Surgeon: Maciel Govea MD  Facility: @[facility]@   Date of Surgery: 2025      SURGEON:    Maciel Govea MD    ASSISTANT:    None    PRE OP DIAGNOSIS:  Left thumb mass    POST OP DIAGNOSIS:  Same    PROCEDURE:   Left thumb mass incisional biopsy x 4      ANESTHESIA:   Local with IV Sedation    GROSS PATHOLOGY:  Elevated thumb nail with pigmented linear changes of nail matrix      PROCEDURE: The patient was seen and consented preoperatively with   the side and site of surgery appropriately marked. The patient was taken back to operative suite, placed supine on the operative table, and placed on monitor for the duration of the case.  The patient was administered sedation and monitored throughout the entire surgery by Department of Anesthesia. While sedated, a digital block was performed and then the   left upper extremity was sterilely prepped and draped in the sterile   orthopedic fashion. A Tournicot was applied to the left thumb. A time-out was performed confirming the site of surgery and surgery to be performed.     A freier elevator was used to remove the entire nail plate. A 3 mm x 1 cm piece of keratinized tissue with pigment was then seen still mildly adherent to the matrix. This was excised and labeled specimen #1. Additional incisional biopsies were harvested using a 15 scalpel of both the proximal germinal matrix and distal sterile matrix and labeled specimens #2 and #3 respectively. No pigmentation of the these specimens was noted. A final pigmented specimen was taken from the subungual nail labeled specimen #4. The incisional biopsy sites were then repaired with 6-0 Chromic sutures.    The kathleen plate was soaked in Betadine and then scraped clean of all remnant soft tissue. The proximal plate was contoured with scissors. The nail was then re-inserted under the nail fold. Two 5-0 Nylon sutures were

## 2025-05-22 NOTE — ANESTHESIA POSTPROCEDURE EVALUATION
Department of Anesthesiology  Postprocedure Note    Patient: Megan Lomeli  MRN: [id number]  YOB: 1945  Date of evaluation: 5/22/2025    Procedure Summary       Date: 05/22/25 Room / Location: [address]    Anesthesia Start: 1101 Anesthesia Stop: 1150    Procedure: LEFT THUMB MASS EXCISIONAL BIOPSY (Left: Thumb) Diagnosis:       Mass of skin of finger, left      (Mass of skin of finger, left [R22.32])    Surgeons: Maciel Govea MD Responsible Provider: Devante Ku APRN - CRNA    Anesthesia Type: MAC ASA Status: 2            Anesthesia Type: No value filed.    Karely Phase I:      Karely Phase II: Karely Score: 10    Anesthesia Post Evaluation    Patient location during evaluation: bedside  Patient participation: complete - patient participated  Level of consciousness: awake  Pain score: 0  Airway patency: patent  Nausea & Vomiting: no nausea and no vomiting  Cardiovascular status: blood pressure returned to baseline and hemodynamically stable  Respiratory status: acceptable and room air  Hydration status: euvolemic  Pain management: adequate    No notable events documented.

## 2025-05-22 NOTE — PROGRESS NOTES
Pt. Awake, alert, and oriented x 4. On room air, vs stable. Pt. Denies pain or nausea. Tolerated shay crackers and water. Dressing to left hand is clean, dry, intact. No drainage noted. [facility] Reviewed discharge instructions with pt. And . Verbalized understanding. No further questions/concerns. Pt. Ready to get dressed and be discharged home.

## 2025-05-22 NOTE — BRIEF OP NOTE
Brief Postoperative Note      Patient: Megan Lomeli  YOB: 1945  MRN: [id number]    Date of Procedure: 5/22/2025    Pre-Op Diagnosis Codes:      * Mass of skin of finger, left [R22.32]    Post-Op Diagnosis: Same       Procedure(s):  LEFT THUMB MASS EXCISIONAL BIOPSY    Surgeon(s):  Maciel Govea MD    Assistant:  * No surgical staff found *    Anesthesia: Monitor Anesthesia Care    Estimated Blood Loss (mL): Minimal    Complications: None    Specimens:   ID Type Source Tests Collected by Time Destination   A : TERMINAL MATRIX LEFT THUMB Tissue Tissue SURGICAL PATHOLOGY Maciel Govea MD 5/22/2025 1126    B : STERILE MATRIX LEFT THUMB #1 Tissue Tissue SURGICAL PATHOLOGY Maciel Govea MD 5/22/2025 1126    C : STERILE MATRIX LEFT THUMB #2 Tissue Tissue SURGICAL PATHOLOGY Maciel Govea MD 5/22/2025 1127    D : ADHERENT STERILE MATRIX LEFT THUMB Tissue Tissue SURGICAL PATHOLOGY Maciel Govea MD 5/22/2025 1128        Implants:  * No implants in log *      Drains: * No LDAs found *    Findings:  Infection Present At Time Of Surgery (PATOS) (choose all levels that have infection present):  No infection present  Other Findings: Pigmented nail changes with elevation of nail plate      Electronically signed by Maciel Govea MD on 5/22/2025 at 11:50 AM

## 2025-05-27 LAB — SURGICAL PATHOLOGY REPORT: NORMAL

## 2025-07-15 DIAGNOSIS — C83.00 MALIGNANT LYMPHOPLASMACYTIC LYMPHOMA (HCC): Primary | ICD-10-CM

## 2025-07-21 ENCOUNTER — HOSPITAL ENCOUNTER (OUTPATIENT)
Dept: INFUSION THERAPY | Age: 80
Discharge: HOME | End: 2025-07-21
Payer: MEDICARE

## 2025-07-21 DIAGNOSIS — C83.00 MALIGNANT LYMPHOPLASMACYTIC LYMPHOMA (HCC): Primary | ICD-10-CM

## 2025-07-21 LAB
ALBUMIN SERPL-MCNC: 4.1 G/DL (ref 3.4–5)
ALBUMIN/GLOB SERPL: 2.3 {RATIO} (ref 1.1–2.2)
ALP SERPL-CCNC: 45 U/L (ref 40–129)
ALT SERPL-CCNC: 12 U/L (ref 10–40)
ANION GAP SERPL CALCULATED.3IONS-SCNC: 12 MMOL/L (ref 9–17)
AST SERPL-CCNC: 20 U/L (ref 15–37)
BASOPHILS # BLD: 0.01 K/UL
BASOPHILS NFR BLD: 0 % (ref 0–1)
BILIRUB SERPL-MCNC: 0.5 MG/DL (ref 0–1)
BUN SERPL-MCNC: 17 MG/DL (ref 7–20)
CALCIUM SERPL-MCNC: 9 MG/DL (ref 8.3–10.6)
CHLORIDE SERPL-SCNC: 94 MMOL/L (ref 99–110)
CO2 SERPL-SCNC: 24 MMOL/L (ref 21–32)
CREAT SERPL-MCNC: 0.8 MG/DL (ref 0.6–1.2)
EOSINOPHIL # BLD: 0.04 K/UL
EOSINOPHILS RELATIVE PERCENT: 1 % (ref 0–3)
ERYTHROCYTE [DISTWIDTH] IN BLOOD BY AUTOMATED COUNT: 14.8 % (ref 11.7–14.9)
ERYTHROCYTE [SEDIMENTATION RATE] IN BLOOD BY WESTERGREN METHOD: 3 MM/HR (ref 0–30)
GFR, ESTIMATED: 70 ML/MIN/1.73M2
GLUCOSE SERPL-MCNC: 187 MG/DL (ref 74–99)
HCT VFR BLD AUTO: 34.6 % (ref 37–47)
HGB BLD-MCNC: 12 G/DL (ref 12.5–16)
IGA SERPL-MCNC: 192 MG/DL (ref 70–400)
IGG SERPL-MCNC: 305 MG/DL (ref 700–1600)
IGM SERPL-MCNC: 124 MG/DL (ref 40–230)
LDH SERPL-CCNC: 193 U/L (ref 100–190)
LYMPHOCYTES NFR BLD: 1.08 K/UL
LYMPHOCYTES RELATIVE PERCENT: 21 % (ref 24–44)
MCH RBC QN AUTO: 33.6 PG (ref 27–31)
MCHC RBC AUTO-ENTMCNC: 34.7 G/DL (ref 32–36)
MCV RBC AUTO: 96.9 FL (ref 78–100)
MONOCYTES NFR BLD: 0.27 K/UL
MONOCYTES NFR BLD: 5 % (ref 0–5)
NEUTROPHILS NFR BLD: 73 % (ref 36–66)
NEUTS SEG NFR BLD: 3.78 K/UL
PLATELET # BLD AUTO: 133 K/UL (ref 140–440)
PMV BLD AUTO: 8.5 FL (ref 7.5–11.1)
POTASSIUM SERPL-SCNC: 3.7 MMOL/L (ref 3.5–5.1)
PROT SERPL-MCNC: 5.9 G/DL (ref 6.4–8.2)
RBC # BLD AUTO: 3.57 M/UL (ref 4.2–5.4)
SODIUM SERPL-SCNC: 129 MMOL/L (ref 136–145)
WBC OTHER # BLD: 5.2 K/UL (ref 4–10.5)

## 2025-07-21 PROCEDURE — 82232 ASSAY OF BETA-2 PROTEIN: CPT

## 2025-07-21 PROCEDURE — 2500000003 HC RX 250 WO HCPCS: Performed by: INTERNAL MEDICINE

## 2025-07-21 PROCEDURE — 83615 LACTATE (LD) (LDH) ENZYME: CPT

## 2025-07-21 PROCEDURE — 85652 RBC SED RATE AUTOMATED: CPT

## 2025-07-21 PROCEDURE — 85025 COMPLETE CBC W/AUTO DIFF WBC: CPT

## 2025-07-21 PROCEDURE — 80053 COMPREHEN METABOLIC PANEL: CPT

## 2025-07-21 PROCEDURE — 83521 IG LIGHT CHAINS FREE EACH: CPT

## 2025-07-21 PROCEDURE — 82784 ASSAY IGA/IGD/IGG/IGM EACH: CPT

## 2025-07-21 PROCEDURE — 36591 DRAW BLOOD OFF VENOUS DEVICE: CPT

## 2025-07-21 RX ORDER — SODIUM CHLORIDE 0.9 % (FLUSH) 0.9 %
20 SYRINGE (ML) INJECTION PRN
Status: DISCONTINUED | OUTPATIENT
Start: 2025-07-21 | End: 2025-07-22 | Stop reason: HOSPADM

## 2025-07-21 RX ORDER — SODIUM CHLORIDE 0.9 % (FLUSH) 0.9 %
10 SYRINGE (ML) INJECTION PRN
OUTPATIENT
Start: 2025-07-21

## 2025-07-21 RX ORDER — HEPARIN 100 UNIT/ML
500 SYRINGE INTRAVENOUS PRN
OUTPATIENT
Start: 2025-07-21

## 2025-07-21 RX ORDER — WATER 10 ML/10ML
2.2 INJECTION INTRAMUSCULAR; INTRAVENOUS; SUBCUTANEOUS ONCE
OUTPATIENT
Start: 2025-07-21 | End: 2025-07-21

## 2025-07-21 RX ORDER — SODIUM CHLORIDE 0.9 % (FLUSH) 0.9 %
20 SYRINGE (ML) INJECTION PRN
OUTPATIENT
Start: 2025-07-21

## 2025-07-21 RX ADMIN — SODIUM CHLORIDE, PRESERVATIVE FREE 30 ML: 5 INJECTION INTRAVENOUS at 14:00

## 2025-07-21 NOTE — PROGRESS NOTES
Pt to tx suite for port draw. Port accessed with blood return noted. Port flushed with 10 cc NS and 10 cc blood wasted. Labs drawn and sent. Port then flushed with 20 cc NS and de-accessed. Pt left ambulatory declined discharge instructions. Next OV 8/1 next port flush scheduled correctly.

## 2025-07-22 LAB — BETA-2 MICROGLOBULIN: 2.2 MG/L

## 2025-07-23 LAB
COMMENT: ABNORMAL
KAPPA FREE LIGHT CHAIN: 20.5 MG/L (ref 2.37–20.73)
KAPPA/LAMBDA RATIO: 2.43 (ref 0.22–1.74)
LAMBDA FREE LIGHT CHAIN: 8.45 MG/L (ref 4.23–27.69)

## 2025-08-13 ENCOUNTER — HOSPITAL ENCOUNTER (OUTPATIENT)
Dept: INFUSION THERAPY | Age: 80
Discharge: HOME | End: 2025-08-13
Payer: MEDICARE

## 2025-08-13 ENCOUNTER — OFFICE VISIT (OUTPATIENT)
Dept: ONCOLOGY | Age: 80
End: 2025-08-13
Payer: MEDICARE

## 2025-08-13 VITALS
WEIGHT: 155.6 LBS | HEIGHT: 62 IN | HEART RATE: 77 BPM | OXYGEN SATURATION: 97 % | BODY MASS INDEX: 28.63 KG/M2 | DIASTOLIC BLOOD PRESSURE: 64 MMHG | SYSTOLIC BLOOD PRESSURE: 126 MMHG | TEMPERATURE: 98 F

## 2025-08-13 DIAGNOSIS — D05.11 DUCTAL CARCINOMA IN SITU (DCIS) OF RIGHT BREAST: ICD-10-CM

## 2025-08-13 DIAGNOSIS — C83.00 MALIGNANT LYMPHOPLASMACYTIC LYMPHOMA (HCC): Primary | ICD-10-CM

## 2025-08-13 PROCEDURE — G8399 PT W/DXA RESULTS DOCUMENT: HCPCS | Performed by: INTERNAL MEDICINE

## 2025-08-13 PROCEDURE — 99214 OFFICE O/P EST MOD 30 MIN: CPT | Performed by: INTERNAL MEDICINE

## 2025-08-13 PROCEDURE — 3078F DIAST BP <80 MM HG: CPT | Performed by: INTERNAL MEDICINE

## 2025-08-13 PROCEDURE — 3074F SYST BP LT 130 MM HG: CPT | Performed by: INTERNAL MEDICINE

## 2025-08-13 PROCEDURE — G8427 DOCREV CUR MEDS BY ELIG CLIN: HCPCS | Performed by: INTERNAL MEDICINE

## 2025-08-13 PROCEDURE — 1036F TOBACCO NON-USER: CPT | Performed by: INTERNAL MEDICINE

## 2025-08-13 PROCEDURE — 1159F MED LIST DOCD IN RCRD: CPT | Performed by: INTERNAL MEDICINE

## 2025-08-13 PROCEDURE — 1126F AMNT PAIN NOTED NONE PRSNT: CPT | Performed by: INTERNAL MEDICINE

## 2025-08-13 PROCEDURE — 1090F PRES/ABSN URINE INCON ASSESS: CPT | Performed by: INTERNAL MEDICINE

## 2025-08-13 PROCEDURE — G8417 CALC BMI ABV UP PARAM F/U: HCPCS | Performed by: INTERNAL MEDICINE

## 2025-08-13 PROCEDURE — 99212 OFFICE O/P EST SF 10 MIN: CPT

## 2025-08-13 PROCEDURE — 1123F ACP DISCUSS/DSCN MKR DOCD: CPT | Performed by: INTERNAL MEDICINE

## 2025-08-13 ASSESSMENT — PATIENT HEALTH QUESTIONNAIRE - PHQ9
1. LITTLE INTEREST OR PLEASURE IN DOING THINGS: NOT AT ALL
SUM OF ALL RESPONSES TO PHQ QUESTIONS 1-9: 0
2. FEELING DOWN, DEPRESSED OR HOPELESS: NOT AT ALL

## 2025-09-03 ENCOUNTER — PROCEDURE VISIT (OUTPATIENT)
Age: 80
End: 2025-09-03

## 2025-09-03 DIAGNOSIS — G56.03 BILATERAL CARPAL TUNNEL SYNDROME: Primary | ICD-10-CM

## (undated) DEVICE — COVER,TABLE,44X90,STERILE: Brand: MEDLINE

## (undated) DEVICE — SHEET, T, LAPAROTOMY, STERILE: Brand: MEDLINE

## (undated) DEVICE — TIP ELECSURG BOVIE

## (undated) DEVICE — PACKING 8004007 NEURAY 200PK 13X76MM: Brand: NEURAY ®

## (undated) DEVICE — PEN SURG MARKING LABELS DEVON SKIN DISP

## (undated) DEVICE — STYLET SURG VIPER PRIM

## (undated) DEVICE — 3M™ STERI-DRAPE™  POUCH WITH IOBAN™ 2 INCISE FILM 6657: Brand: STERI-DRAPE™ IOBAN™ 2

## (undated) DEVICE — DRAPE,UTILITY,XL,4/PK,STERILE: Brand: MEDLINE

## (undated) DEVICE — COVER,C-ARM,41X74: Brand: MEDLINE

## (undated) DEVICE — INTENDED FOR TISSUE SEPARATION, AND OTHER PROCEDURES THAT REQUIRE A SHARP SURGICAL BLADE TO PUNCTURE OR CUT.: Brand: BARD-PARKER ® STAINLESS STEEL BLADES

## (undated) DEVICE — STERILE-Z STAND AND BACK TABLE COVER 66IN X 95IN: Brand: STERILE-Z

## (undated) DEVICE — SUTURE VCRL SZ 3-0 L18IN ABSRB UD L26MM SH 1/2 CIR J864D

## (undated) DEVICE — DRESSING TRNSPAR W2XL2.75IN FLM SHT SEMIPERMEABLE WIND

## (undated) DEVICE — SUTURE VCRL SZ 0 L18IN ABSRB UD L36MM CT-1 1/2 CIR J840D

## (undated) DEVICE — AEGIS 1" DISK 4MM HOLE, PEEL OPEN: Brand: MEDLINE

## (undated) DEVICE — COVER MICSCP W46XL120IN 4 BINOC GLS LENS LEICA

## (undated) DEVICE — TOWEL,OR,DSP,ST,BLUE,STD,6/PK,12PK/CS: Brand: MEDLINE

## (undated) DEVICE — SOLUTION IV IRRIG 1000ML POUR BTL 2F7114

## (undated) DEVICE — GLOVE SURG SZ 85 L1185IN FNGR THK75MIL STRW LTX POLYMER

## (undated) DEVICE — APPLICATOR BNDG 1MM ADH PREMIERPRO EXOFIN

## (undated) DEVICE — Z INACTIVE SYRINGE BLB IRR

## (undated) DEVICE — SPONGE LAP W12XL12IN WHT STRUNG RADPQ PREWASHED ST

## (undated) DEVICE — TAPE,CLOTH/SILK,CURAD,3"X10YD,LF,40/CS: Brand: CURAD

## (undated) DEVICE — SUTURE STARTAFIX 3-0 PS-1 30CM

## (undated) DEVICE — SUTURE SZ 0 27IN 5/8 CIR UR-6  TAPER PT VIOLET ABSRB VICRYL J603H

## (undated) DEVICE — YANKAUER,BULB TIP,W/O VENT,RIGID,STERILE: Brand: MEDLINE

## (undated) DEVICE — GLOVE SURG SZ 75 CRM LTX FREE POLYISOPRENE POLYMER BEAD ANTI

## (undated) DEVICE — TOURNIQUET PHLEB L EXSANGUINATING FOR AD DGT TOURNI-COT

## (undated) DEVICE — CONTAINER,SPECIMEN,OR STERILE,4OZ: Brand: MEDLINE

## (undated) DEVICE — 3M™ IOBAN™ 2 ANTIMICROBIAL INCISE DRAPE 6650EZ: Brand: IOBAN™ 2

## (undated) DEVICE — KNIFE SURG 15 DEG STBL BLADE

## (undated) DEVICE — Device

## (undated) DEVICE — NIPPLE MARKERS, 2.3MM NON-METALLIC PELLET, PINCH-FREE MATERIAL WITH ADHESIVE FREE CENTER: Brand: N-SPOT

## (undated) DEVICE — COVER MAYO STAND XL STRL DISP

## (undated) DEVICE — TUBING, SUCTION, 9/32" X 10', STRAIGHT: Brand: MEDLINE

## (undated) DEVICE — AGENT HEMOSTATIC SURGIFLOW MATRIX KIT W/THROMBIN

## (undated) DEVICE — MARKER SURG SKIN UTIL REGULAR/FINE 2 TIP W/ RUL AND 9 LBL

## (undated) DEVICE — DRAPE SHEET ULTRAGARD: Brand: MEDLINE

## (undated) DEVICE — SYRINGE IRRIG 60ML SFT PLIABLE BLB EZ TO GRP 1 HND USE W/

## (undated) DEVICE — DISSECTOR LAP DIA5MM BLNT TIP ENDOPATH

## (undated) DEVICE — SUTURE 1 STRATAFIX SYMMETRIC PDS + 30CM CT-1 SXPP1A435

## (undated) DEVICE — SPONGE LAP W18XL18IN WHT COT 4 PLY FLD STRUNG RADPQ DISP ST

## (undated) DEVICE — SUTURE ABSORBABLE MONOFILAMENT 3-0 PS1 12 IN UD MONOCRYL + SXMP1B101

## (undated) DEVICE — TRAY PREP DRY W/ PREM GLV 2 APPL 6 SPNG 2 UNDPD 1 OVERWRAP

## (undated) DEVICE — PENCIL ES CRD L10FT HND SWCHING ROCK SWCH W/ EDGE COAT BLDE

## (undated) DEVICE — STERILE SURGICAL BLADE SIZE 11: Brand: CARDINAL HEALTH

## (undated) DEVICE — DRAPE,EENT,SPLIT,STERILE: Brand: MEDLINE

## (undated) DEVICE — 3M™ STERI-DRAPE™ INSTRUMENT POUCH 1018: Brand: STERI-DRAPE™

## (undated) DEVICE — SALINE MICRO 0.85% 1.0ML 15-16.5X102-103MM

## (undated) DEVICE — GLOVE SURG SZ 65 THK91MIL LTX FREE SYN POLYISOPRENE

## (undated) DEVICE — UNDERGLOVE SURG SZ 8.5 FNGR THK0.21MIL GRN LTX BEAD CUF

## (undated) DEVICE — SOLUTION IV IRRIG POUR BRL 0.9% SODIUM CHL 2F7124

## (undated) DEVICE — 6619 2 PTNT ISO SYS INCISE AREA&LT;(&GT;&&LT;)&GT;P: Brand: STERI-DRAPE™ IOBAN™ 2

## (undated) DEVICE — NEEDLE SPNL 20GX3.5IN 1420C

## (undated) DEVICE — SYRINGE 10ML HYPO W/O NDL W/ LUER SLP TP

## (undated) DEVICE — DRAPE SURG W46XL60IN STRL FOR LEICA M800 SER DRPTECH